# Patient Record
Sex: FEMALE | Race: WHITE | Employment: OTHER | URBAN - NONMETROPOLITAN AREA
[De-identification: names, ages, dates, MRNs, and addresses within clinical notes are randomized per-mention and may not be internally consistent; named-entity substitution may affect disease eponyms.]

---

## 2017-02-02 ENCOUNTER — HOSPITAL ENCOUNTER (EMERGENCY)
Age: 62
Discharge: HOME | End: 2017-02-02
Attending: FAMILY MEDICINE
Payer: MEDICARE

## 2017-02-02 ENCOUNTER — APPOINTMENT (OUTPATIENT)
Dept: CT IMAGING | Age: 62
End: 2017-02-02
Payer: MEDICARE

## 2017-02-02 ENCOUNTER — APPOINTMENT (OUTPATIENT)
Dept: GENERAL RADIOLOGY | Age: 62
End: 2017-02-02
Payer: MEDICARE

## 2017-02-02 VITALS
RESPIRATION RATE: 18 BRPM | OXYGEN SATURATION: 96 % | SYSTOLIC BLOOD PRESSURE: 97 MMHG | HEIGHT: 64 IN | WEIGHT: 180 LBS | BODY MASS INDEX: 30.73 KG/M2 | DIASTOLIC BLOOD PRESSURE: 61 MMHG | HEART RATE: 73 BPM | TEMPERATURE: 97.5 F

## 2017-02-02 DIAGNOSIS — N30.01 ACUTE CYSTITIS WITH HEMATURIA: Primary | ICD-10-CM

## 2017-02-02 LAB
ALBUMIN SERPL-MCNC: 4.2 G/DL (ref 3.5–5.2)
ALP BLD-CCNC: 43 U/L (ref 35–104)
ALT SERPL-CCNC: 11 U/L (ref 5–33)
AMPHETAMINE SCREEN, URINE: NEGATIVE
ANION GAP SERPL CALCULATED.3IONS-SCNC: 11 MMOL/L (ref 7–19)
AST SERPL-CCNC: 13 U/L (ref 5–32)
BACTERIA: ABNORMAL /HPF
BARBITURATE SCREEN URINE: NEGATIVE
BASOPHILS ABSOLUTE: 0 K/UL (ref 0–0.2)
BASOPHILS RELATIVE PERCENT: 0.2 % (ref 0–1)
BENZODIAZEPINE SCREEN, URINE: NEGATIVE
BILIRUB SERPL-MCNC: <0.2 MG/DL (ref 0.2–1.2)
BILIRUBIN URINE: NEGATIVE
BLOOD, URINE: NEGATIVE
BUN BLDV-MCNC: 26 MG/DL (ref 8–23)
CALCIUM SERPL-MCNC: 10 MG/DL (ref 8.8–10.2)
CANNABINOID SCREEN URINE: NEGATIVE
CHLORIDE BLD-SCNC: 104 MMOL/L (ref 98–111)
CLARITY: CLEAR
CO2: 24 MMOL/L (ref 22–29)
COCAINE METABOLITE SCREEN URINE: NEGATIVE
COLOR: YELLOW
CREAT SERPL-MCNC: 1.2 MG/DL (ref 0.5–0.9)
EOSINOPHILS ABSOLUTE: 0.9 K/UL (ref 0–0.6)
EOSINOPHILS RELATIVE PERCENT: 7.8 % (ref 0–5)
EPITHELIAL CELLS, UA: 0 /HPF (ref 0–5)
GFR NON-AFRICAN AMERICAN: 45
GLOBULIN: 2 G/DL
GLUCOSE BLD-MCNC: 87 MG/DL (ref 74–109)
GLUCOSE URINE: NEGATIVE MG/DL
HCT VFR BLD CALC: 34 % (ref 37–47)
HEMOGLOBIN: 10.5 G/DL (ref 12–16)
HYALINE CASTS: 2 /HPF (ref 0–8)
KETONES, URINE: NEGATIVE MG/DL
LEUKOCYTE ESTERASE, URINE: ABNORMAL
LITHIUM LEVEL: 0.9 MMOL/L (ref 0.6–1.2)
LYMPHOCYTES ABSOLUTE: 2.1 K/UL (ref 1.1–4.5)
LYMPHOCYTES RELATIVE PERCENT: 18.1 % (ref 20–40)
Lab: NORMAL
MCH RBC QN AUTO: 27.6 PG (ref 27–31)
MCHC RBC AUTO-ENTMCNC: 30.9 G/DL (ref 33–37)
MCV RBC AUTO: 89.2 FL (ref 81–99)
MONOCYTES ABSOLUTE: 0.4 K/UL (ref 0–0.9)
MONOCYTES RELATIVE PERCENT: 3.5 % (ref 0–10)
NEUTROPHILS ABSOLUTE: 8.2 K/UL (ref 1.5–7.5)
NEUTROPHILS RELATIVE PERCENT: 70 % (ref 50–65)
NITRITE, URINE: POSITIVE
OPIATE SCREEN URINE: NEGATIVE
PDW BLD-RTO: 12.9 % (ref 11.5–14.5)
PH UA: 7.5
PLATELET # BLD: 329 K/UL (ref 130–400)
PMV BLD AUTO: 8.8 FL (ref 7.4–10.4)
POTASSIUM SERPL-SCNC: 4.7 MMOL/L (ref 3.5–5)
PROTEIN UA: NEGATIVE MG/DL
RBC # BLD: 3.81 M/UL (ref 4.2–5.4)
RBC UA: 1 /HPF (ref 0–4)
SODIUM BLD-SCNC: 139 MMOL/L (ref 136–145)
SPECIFIC GRAVITY UA: 1.01
TOTAL PROTEIN: 6.2 G/DL (ref 6.6–8.7)
TSH SERPL DL<=0.05 MIU/L-ACNC: 1.84 UIU/ML (ref 0.27–4.2)
UROBILINOGEN, URINE: 0.2 E.U./DL
WBC # BLD: 11.7 K/UL (ref 4.8–10.8)
WBC UA: 29 /HPF (ref 0–5)

## 2017-02-02 PROCEDURE — 85025 COMPLETE CBC W/AUTO DIFF WBC: CPT

## 2017-02-02 PROCEDURE — 70450 CT HEAD/BRAIN W/O DYE: CPT

## 2017-02-02 PROCEDURE — 99282 EMERGENCY DEPT VISIT SF MDM: CPT | Performed by: FAMILY MEDICINE

## 2017-02-02 PROCEDURE — 80178 ASSAY OF LITHIUM: CPT

## 2017-02-02 PROCEDURE — 36415 COLL VENOUS BLD VENIPUNCTURE: CPT

## 2017-02-02 PROCEDURE — 6360000002 HC RX W HCPCS: Performed by: FAMILY MEDICINE

## 2017-02-02 PROCEDURE — 81001 URINALYSIS AUTO W/SCOPE: CPT

## 2017-02-02 PROCEDURE — 96374 THER/PROPH/DIAG INJ IV PUSH: CPT

## 2017-02-02 PROCEDURE — 87086 URINE CULTURE/COLONY COUNT: CPT

## 2017-02-02 PROCEDURE — 80053 COMPREHEN METABOLIC PANEL: CPT

## 2017-02-02 PROCEDURE — 2580000003 HC RX 258: Performed by: FAMILY MEDICINE

## 2017-02-02 PROCEDURE — 99284 EMERGENCY DEPT VISIT MOD MDM: CPT

## 2017-02-02 PROCEDURE — 87185 SC STD ENZYME DETCJ PER NZM: CPT

## 2017-02-02 PROCEDURE — 87077 CULTURE AEROBIC IDENTIFY: CPT

## 2017-02-02 PROCEDURE — 80307 DRUG TEST PRSMV CHEM ANLYZR: CPT

## 2017-02-02 PROCEDURE — 84443 ASSAY THYROID STIM HORMONE: CPT

## 2017-02-02 PROCEDURE — 73560 X-RAY EXAM OF KNEE 1 OR 2: CPT

## 2017-02-02 RX ORDER — PROPRANOLOL HYDROCHLORIDE 10 MG/1
10 TABLET ORAL 2 TIMES DAILY
Status: ON HOLD | COMMUNITY
End: 2017-02-07 | Stop reason: HOSPADM

## 2017-02-02 RX ORDER — LEVOFLOXACIN 500 MG/1
500 TABLET, FILM COATED ORAL DAILY
Qty: 10 TABLET | Refills: 0 | Status: ON HOLD | OUTPATIENT
Start: 2017-02-02 | End: 2017-02-07 | Stop reason: HOSPADM

## 2017-02-02 RX ORDER — OMEPRAZOLE 20 MG/1
20 CAPSULE, DELAYED RELEASE ORAL DAILY
COMMUNITY
End: 2017-05-18

## 2017-02-02 RX ORDER — LORAZEPAM 1 MG/1
1 TABLET ORAL EVERY 8 HOURS PRN
Qty: 15 TABLET | Refills: 0 | Status: ON HOLD | OUTPATIENT
Start: 2017-02-02 | End: 2017-02-07 | Stop reason: HOSPADM

## 2017-02-02 RX ORDER — 0.9 % SODIUM CHLORIDE 0.9 %
1000 INTRAVENOUS SOLUTION INTRAVENOUS ONCE
Status: COMPLETED | OUTPATIENT
Start: 2017-02-02 | End: 2017-02-02

## 2017-02-02 RX ORDER — MONTELUKAST SODIUM 10 MG/1
10 TABLET ORAL NIGHTLY
Status: ON HOLD | COMMUNITY
End: 2017-02-04

## 2017-02-02 RX ORDER — FENOFIBRATE 145 MG/1
145 TABLET, COATED ORAL DAILY
Status: ON HOLD | COMMUNITY
End: 2017-03-07 | Stop reason: HOSPADM

## 2017-02-02 RX ORDER — TRIHEXYPHENIDYL HYDROCHLORIDE 5 MG/1
5 TABLET ORAL 3 TIMES DAILY
Status: ON HOLD | COMMUNITY
End: 2017-03-07

## 2017-02-02 RX ORDER — LISINOPRIL 20 MG/1
20 TABLET ORAL DAILY
Status: ON HOLD | COMMUNITY
End: 2019-07-26

## 2017-02-02 RX ORDER — PRAVASTATIN SODIUM 10 MG
10 TABLET ORAL NIGHTLY
Status: ON HOLD | COMMUNITY
End: 2017-03-07 | Stop reason: HOSPADM

## 2017-02-02 RX ORDER — LORAZEPAM 2 MG/ML
2 INJECTION INTRAMUSCULAR ONCE
Status: COMPLETED | OUTPATIENT
Start: 2017-02-02 | End: 2017-02-02

## 2017-02-02 RX ORDER — OXYBUTYNIN CHLORIDE 5 MG/1
5 TABLET ORAL 3 TIMES DAILY
Status: ON HOLD | COMMUNITY
End: 2017-03-07 | Stop reason: HOSPADM

## 2017-02-02 RX ORDER — DONEPEZIL HYDROCHLORIDE 5 MG/1
5 TABLET, FILM COATED ORAL NIGHTLY
Status: ON HOLD | COMMUNITY
End: 2017-03-07 | Stop reason: HOSPADM

## 2017-02-02 RX ADMIN — SODIUM CHLORIDE 1000 ML: 9 INJECTION, SOLUTION INTRAVENOUS at 14:50

## 2017-02-02 RX ADMIN — LORAZEPAM 2 MG: 2 INJECTION INTRAMUSCULAR; INTRAVENOUS at 14:47

## 2017-02-04 ENCOUNTER — APPOINTMENT (OUTPATIENT)
Dept: GENERAL RADIOLOGY | Age: 62
End: 2017-02-04
Payer: MEDICARE

## 2017-02-04 ENCOUNTER — HOSPITAL ENCOUNTER (OUTPATIENT)
Age: 62
Setting detail: OBSERVATION
Discharge: HOME HEALTH | End: 2017-02-07
Attending: EMERGENCY MEDICINE | Admitting: HOSPITALIST
Payer: MEDICARE

## 2017-02-04 DIAGNOSIS — R79.89 ELEVATED SERUM CREATININE: ICD-10-CM

## 2017-02-04 DIAGNOSIS — R50.9 FEBRILE ILLNESS: ICD-10-CM

## 2017-02-04 DIAGNOSIS — Z87.440 HISTORY OF UTI: ICD-10-CM

## 2017-02-04 DIAGNOSIS — R79.89 ELEVATED LACTIC ACID LEVEL: ICD-10-CM

## 2017-02-04 DIAGNOSIS — E87.5 HYPERKALEMIA: ICD-10-CM

## 2017-02-04 DIAGNOSIS — R06.4 HYPERVENTILATION: ICD-10-CM

## 2017-02-04 DIAGNOSIS — R68.89 RIGOR: Primary | ICD-10-CM

## 2017-02-04 PROBLEM — D64.9 ANEMIA OF UNKNOWN ETIOLOGY: Status: ACTIVE | Noted: 2017-02-04

## 2017-02-04 LAB
ALBUMIN SERPL-MCNC: 4.2 G/DL (ref 3.5–5.2)
ALP BLD-CCNC: 41 U/L (ref 35–104)
ALT SERPL-CCNC: 10 U/L (ref 5–33)
AMYLASE: 44 U/L (ref 28–100)
ANION GAP SERPL CALCULATED.3IONS-SCNC: 13 MMOL/L (ref 7–19)
APTT: 23.3 SEC (ref 26–36.2)
AST SERPL-CCNC: 17 U/L (ref 5–32)
BASE EXCESS ARTERIAL: -1.3 MMOL/L (ref -2–2)
BASOPHILS ABSOLUTE: 0 K/UL (ref 0–0.2)
BASOPHILS RELATIVE PERCENT: 0.3 % (ref 0–1)
BILIRUB SERPL-MCNC: <0.2 MG/DL (ref 0.2–1.2)
BILIRUBIN URINE: NEGATIVE
BLOOD, URINE: NEGATIVE
BUN BLDV-MCNC: 22 MG/DL (ref 8–23)
CALCIUM SERPL-MCNC: 10.1 MG/DL (ref 8.8–10.2)
CARBOXYHEMOGLOBIN ARTERIAL: 0 % (ref 0–5)
CHLORIDE BLD-SCNC: 105 MMOL/L (ref 98–111)
CLARITY: CLEAR
CO2: 21 MMOL/L (ref 22–29)
COLOR: YELLOW
CREAT SERPL-MCNC: 1.6 MG/DL (ref 0.5–0.9)
EOSINOPHILS ABSOLUTE: 0.5 K/UL (ref 0–0.6)
EOSINOPHILS RELATIVE PERCENT: 4.2 % (ref 0–5)
GFR NON-AFRICAN AMERICAN: 33
GLOBULIN: 1.9 G/DL
GLUCOSE BLD-MCNC: 108 MG/DL (ref 74–109)
GLUCOSE URINE: NEGATIVE MG/DL
HCO3 ARTERIAL: 21.1 MMOL/L (ref 22–26)
HCT VFR BLD CALC: 33 % (ref 37–47)
HEMOGLOBIN, ART, EXTENDED: 10.1 G/DL (ref 12–16)
HEMOGLOBIN: 10.6 G/DL (ref 12–16)
KETONES, URINE: NEGATIVE MG/DL
LACTIC ACID: 2.7 MG/DL (ref 0.5–1.9)
LEUKOCYTE ESTERASE, URINE: NEGATIVE
LIPASE: 36 U/L (ref 13–60)
LITHIUM LEVEL: 1 MMOL/L (ref 0.6–1.2)
LYMPHOCYTES ABSOLUTE: 1.5 K/UL (ref 1.1–4.5)
LYMPHOCYTES RELATIVE PERCENT: 13.8 % (ref 20–40)
MCH RBC QN AUTO: 28.4 PG (ref 27–31)
MCHC RBC AUTO-ENTMCNC: 32.1 G/DL (ref 33–37)
MCV RBC AUTO: 88.5 FL (ref 81–99)
METHEMOGLOBIN ARTERIAL: 0 %
MONOCYTES ABSOLUTE: 0.5 K/UL (ref 0–0.9)
MONOCYTES RELATIVE PERCENT: 4.3 % (ref 0–10)
NEUTROPHILS ABSOLUTE: 8.6 K/UL (ref 1.5–7.5)
NEUTROPHILS RELATIVE PERCENT: 77.4 % (ref 50–65)
NITRITE, URINE: NEGATIVE
O2 CONTENT ARTERIAL: 14.2 ML/DL
O2 SAT, ARTERIAL: 95.7 %
O2 THERAPY: ABNORMAL
ORGANISM: ABNORMAL
PCO2 ARTERIAL: 27 MMHG (ref 35–45)
PDW BLD-RTO: 12.9 % (ref 11.5–14.5)
PH ARTERIAL: 7.5 (ref 7.35–7.45)
PH UA: 6
PLATELET # BLD: 504 K/UL (ref 130–400)
PMV BLD AUTO: 8.7 FL (ref 7.4–10.4)
PO2 ARTERIAL: 249 MMHG (ref 80–100)
POTASSIUM SERPL-SCNC: 5.4 MMOL/L (ref 3.5–5)
POTASSIUM, WHOLE BLOOD: 4.7
PROTEIN UA: NEGATIVE MG/DL
RAPID INFLUENZA  B AGN: NEGATIVE
RAPID INFLUENZA A AGN: NEGATIVE
RBC # BLD: 3.73 M/UL (ref 4.2–5.4)
SODIUM BLD-SCNC: 139 MMOL/L (ref 136–145)
SPECIFIC GRAVITY UA: 1.01
TOTAL CK: 44 U/L (ref 26–192)
TOTAL PROTEIN: 6.1 G/DL (ref 6.6–8.7)
TROPONIN: 0.01 NG/ML (ref 0–0.03)
TSH SERPL DL<=0.05 MIU/L-ACNC: 3.31 UIU/ML (ref 0.27–4.2)
URINE CULTURE, ROUTINE: ABNORMAL
URINE CULTURE, ROUTINE: ABNORMAL
UROBILINOGEN, URINE: 0.2 E.U./DL
WBC # BLD: 11.1 K/UL (ref 4.8–10.8)

## 2017-02-04 PROCEDURE — 71010 XR CHEST PORTABLE: CPT

## 2017-02-04 PROCEDURE — 84484 ASSAY OF TROPONIN QUANT: CPT

## 2017-02-04 PROCEDURE — 85730 THROMBOPLASTIN TIME PARTIAL: CPT

## 2017-02-04 PROCEDURE — 87040 BLOOD CULTURE FOR BACTERIA: CPT

## 2017-02-04 PROCEDURE — 84132 ASSAY OF SERUM POTASSIUM: CPT

## 2017-02-04 PROCEDURE — 99285 EMERGENCY DEPT VISIT HI MDM: CPT

## 2017-02-04 PROCEDURE — G0378 HOSPITAL OBSERVATION PER HR: HCPCS

## 2017-02-04 PROCEDURE — 2580000003 HC RX 258: Performed by: CLINICAL NURSE SPECIALIST

## 2017-02-04 PROCEDURE — 80178 ASSAY OF LITHIUM: CPT

## 2017-02-04 PROCEDURE — 83605 ASSAY OF LACTIC ACID: CPT

## 2017-02-04 PROCEDURE — 82150 ASSAY OF AMYLASE: CPT

## 2017-02-04 PROCEDURE — 2580000003 HC RX 258: Performed by: EMERGENCY MEDICINE

## 2017-02-04 PROCEDURE — 83690 ASSAY OF LIPASE: CPT

## 2017-02-04 PROCEDURE — 82550 ASSAY OF CK (CPK): CPT

## 2017-02-04 PROCEDURE — 81003 URINALYSIS AUTO W/O SCOPE: CPT

## 2017-02-04 PROCEDURE — 6370000000 HC RX 637 (ALT 250 FOR IP): Performed by: EMERGENCY MEDICINE

## 2017-02-04 PROCEDURE — 84443 ASSAY THYROID STIM HORMONE: CPT

## 2017-02-04 PROCEDURE — 80053 COMPREHEN METABOLIC PANEL: CPT

## 2017-02-04 PROCEDURE — 82803 BLOOD GASES ANY COMBINATION: CPT

## 2017-02-04 PROCEDURE — 85025 COMPLETE CBC W/AUTO DIFF WBC: CPT

## 2017-02-04 PROCEDURE — 36600 WITHDRAWAL OF ARTERIAL BLOOD: CPT

## 2017-02-04 PROCEDURE — 6370000000 HC RX 637 (ALT 250 FOR IP): Performed by: CLINICAL NURSE SPECIALIST

## 2017-02-04 PROCEDURE — 99219 PR INITIAL OBSERVATION CARE/DAY 50 MINUTES: CPT | Performed by: CLINICAL NURSE SPECIALIST

## 2017-02-04 PROCEDURE — 99284 EMERGENCY DEPT VISIT MOD MDM: CPT | Performed by: EMERGENCY MEDICINE

## 2017-02-04 PROCEDURE — 96365 THER/PROPH/DIAG IV INF INIT: CPT

## 2017-02-04 PROCEDURE — 93005 ELECTROCARDIOGRAM TRACING: CPT

## 2017-02-04 PROCEDURE — 6360000002 HC RX W HCPCS: Performed by: EMERGENCY MEDICINE

## 2017-02-04 PROCEDURE — 36415 COLL VENOUS BLD VENIPUNCTURE: CPT

## 2017-02-04 PROCEDURE — 87804 INFLUENZA ASSAY W/OPTIC: CPT

## 2017-02-04 RX ORDER — ACETAMINOPHEN 325 MG/1
650 TABLET ORAL EVERY 4 HOURS PRN
Status: DISCONTINUED | OUTPATIENT
Start: 2017-02-04 | End: 2017-02-07 | Stop reason: HOSPADM

## 2017-02-04 RX ORDER — PRAVASTATIN SODIUM 20 MG
10 TABLET ORAL NIGHTLY
Status: DISCONTINUED | OUTPATIENT
Start: 2017-02-04 | End: 2017-02-07 | Stop reason: HOSPADM

## 2017-02-04 RX ORDER — FENOFIBRATE 160 MG/1
160 TABLET ORAL DAILY
Status: DISCONTINUED | OUTPATIENT
Start: 2017-02-05 | End: 2017-02-07 | Stop reason: HOSPADM

## 2017-02-04 RX ORDER — SODIUM CHLORIDE 9 MG/ML
INJECTION, SOLUTION INTRAVENOUS CONTINUOUS
Status: DISCONTINUED | OUTPATIENT
Start: 2017-02-04 | End: 2017-02-06

## 2017-02-04 RX ORDER — PANTOPRAZOLE SODIUM 40 MG/1
40 TABLET, DELAYED RELEASE ORAL
Status: DISCONTINUED | OUTPATIENT
Start: 2017-02-05 | End: 2017-02-07 | Stop reason: HOSPADM

## 2017-02-04 RX ORDER — TRIHEXYPHENIDYL HYDROCHLORIDE 2 MG/1
5 TABLET ORAL 3 TIMES DAILY
Status: DISCONTINUED | OUTPATIENT
Start: 2017-02-04 | End: 2017-02-07 | Stop reason: HOSPADM

## 2017-02-04 RX ORDER — PROPRANOLOL HYDROCHLORIDE 10 MG/1
10 TABLET ORAL 2 TIMES DAILY
Status: DISCONTINUED | OUTPATIENT
Start: 2017-02-04 | End: 2017-02-06

## 2017-02-04 RX ORDER — OXYBUTYNIN CHLORIDE 5 MG/1
5 TABLET ORAL 3 TIMES DAILY
Status: DISCONTINUED | OUTPATIENT
Start: 2017-02-04 | End: 2017-02-07 | Stop reason: HOSPADM

## 2017-02-04 RX ORDER — LORAZEPAM 1 MG/1
1 TABLET ORAL EVERY 8 HOURS PRN
Status: DISCONTINUED | OUTPATIENT
Start: 2017-02-04 | End: 2017-02-04

## 2017-02-04 RX ORDER — ESCITALOPRAM OXALATE 10 MG/1
20 TABLET ORAL DAILY
Status: DISCONTINUED | OUTPATIENT
Start: 2017-02-05 | End: 2017-02-07 | Stop reason: HOSPADM

## 2017-02-04 RX ORDER — LEVOTHYROXINE SODIUM 0.05 MG/1
75 TABLET ORAL DAILY
Status: DISCONTINUED | OUTPATIENT
Start: 2017-02-05 | End: 2017-02-07 | Stop reason: HOSPADM

## 2017-02-04 RX ORDER — DEXTROSE MONOHYDRATE 50 MG/ML
100 INJECTION, SOLUTION INTRAVENOUS PRN
Status: DISCONTINUED | OUTPATIENT
Start: 2017-02-04 | End: 2017-02-06

## 2017-02-04 RX ORDER — MONTELUKAST SODIUM 10 MG/1
10 TABLET ORAL DAILY
Status: ON HOLD | COMMUNITY
End: 2018-04-02 | Stop reason: ALTCHOICE

## 2017-02-04 RX ORDER — ACETAMINOPHEN 650 MG/1
650 SUPPOSITORY RECTAL ONCE
Status: COMPLETED | OUTPATIENT
Start: 2017-02-04 | End: 2017-02-04

## 2017-02-04 RX ORDER — MONTELUKAST SODIUM 10 MG/1
10 TABLET ORAL DAILY
Status: DISCONTINUED | OUTPATIENT
Start: 2017-02-05 | End: 2017-02-07 | Stop reason: HOSPADM

## 2017-02-04 RX ORDER — GABAPENTIN 600 MG/1
600 TABLET ORAL 2 TIMES DAILY
Status: DISCONTINUED | OUTPATIENT
Start: 2017-02-04 | End: 2017-02-07 | Stop reason: HOSPADM

## 2017-02-04 RX ORDER — ARIPIPRAZOLE 30 MG/1
30 TABLET ORAL NIGHTLY
Status: DISCONTINUED | OUTPATIENT
Start: 2017-02-04 | End: 2017-02-07 | Stop reason: HOSPADM

## 2017-02-04 RX ORDER — LITHIUM CARBONATE 150 MG/1
600 CAPSULE ORAL DAILY
Status: DISCONTINUED | OUTPATIENT
Start: 2017-02-05 | End: 2017-02-07 | Stop reason: HOSPADM

## 2017-02-04 RX ORDER — DEXTROSE MONOHYDRATE 25 G/50ML
12.5 INJECTION, SOLUTION INTRAVENOUS PRN
Status: DISCONTINUED | OUTPATIENT
Start: 2017-02-04 | End: 2017-02-07 | Stop reason: HOSPADM

## 2017-02-04 RX ORDER — SUMATRIPTAN 25 MG/1
100 TABLET, FILM COATED ORAL DAILY
Status: DISCONTINUED | OUTPATIENT
Start: 2017-02-05 | End: 2017-02-05

## 2017-02-04 RX ORDER — TRAZODONE HYDROCHLORIDE 50 MG/1
50 TABLET ORAL NIGHTLY
COMMUNITY
End: 2017-04-11 | Stop reason: HOSPADM

## 2017-02-04 RX ORDER — DONEPEZIL HYDROCHLORIDE 5 MG/1
5 TABLET, FILM COATED ORAL NIGHTLY
Status: DISCONTINUED | OUTPATIENT
Start: 2017-02-04 | End: 2017-02-07 | Stop reason: HOSPADM

## 2017-02-04 RX ORDER — TRAZODONE HYDROCHLORIDE 50 MG/1
50 TABLET ORAL NIGHTLY
Status: DISCONTINUED | OUTPATIENT
Start: 2017-02-04 | End: 2017-02-07 | Stop reason: HOSPADM

## 2017-02-04 RX ORDER — NICOTINE POLACRILEX 4 MG
15 LOZENGE BUCCAL PRN
Status: DISCONTINUED | OUTPATIENT
Start: 2017-02-04 | End: 2017-02-07 | Stop reason: HOSPADM

## 2017-02-04 RX ORDER — SUCRALFATE 1 G/1
1 TABLET ORAL 4 TIMES DAILY
Status: DISCONTINUED | OUTPATIENT
Start: 2017-02-04 | End: 2017-02-07 | Stop reason: HOSPADM

## 2017-02-04 RX ORDER — LISINOPRIL 20 MG/1
20 TABLET ORAL DAILY
Status: DISCONTINUED | OUTPATIENT
Start: 2017-02-05 | End: 2017-02-07 | Stop reason: HOSPADM

## 2017-02-04 RX ADMIN — ACETAMINOPHEN 650 MG: 650 SUPPOSITORY RECTAL at 17:08

## 2017-02-04 RX ADMIN — OXYBUTYNIN CHLORIDE 5 MG: 5 TABLET ORAL at 22:13

## 2017-02-04 RX ADMIN — SODIUM CHLORIDE: 9 INJECTION, SOLUTION INTRAVENOUS at 22:15

## 2017-02-04 RX ADMIN — DONEPEZIL HYDROCHLORIDE 5 MG: 5 TABLET, FILM COATED ORAL at 22:14

## 2017-02-04 RX ADMIN — PRAVASTATIN SODIUM 10 MG: 20 TABLET ORAL at 22:13

## 2017-02-04 RX ADMIN — PROPRANOLOL HYDROCHLORIDE 10 MG: 10 TABLET ORAL at 22:13

## 2017-02-04 RX ADMIN — TRIHEXYPHENIDYL HYDROCHLORIDE 5 MG: 2 TABLET ORAL at 22:13

## 2017-02-04 RX ADMIN — CEFTRIAXONE SODIUM 1 G: 1 INJECTION, POWDER, FOR SOLUTION INTRAMUSCULAR; INTRAVENOUS at 17:55

## 2017-02-04 RX ADMIN — BECLOMETHASONE DIPROPIONATE 2 PUFF: 80 AEROSOL, METERED RESPIRATORY (INHALATION) at 22:37

## 2017-02-04 RX ADMIN — ARIPIPRAZOLE 30 MG: 30 TABLET ORAL at 22:13

## 2017-02-04 RX ADMIN — GABAPENTIN 600 MG: 600 TABLET, FILM COATED ORAL at 22:13

## 2017-02-04 RX ADMIN — SUCRALFATE 1 G: 1 TABLET ORAL at 22:13

## 2017-02-04 RX ADMIN — TRAZODONE HYDROCHLORIDE 50 MG: 50 TABLET ORAL at 22:13

## 2017-02-04 ASSESSMENT — ENCOUNTER SYMPTOMS
CHOKING: 0
EYE DISCHARGE: 0
SORE THROAT: 0
FACIAL SWELLING: 0
DIARRHEA: 0
NAUSEA: 0
APNEA: 0
VOMITING: 0
SINUS PRESSURE: 0
VOICE CHANGE: 0
BLOOD IN STOOL: 0
CONSTIPATION: 0

## 2017-02-05 ENCOUNTER — APPOINTMENT (OUTPATIENT)
Dept: CT IMAGING | Age: 62
End: 2017-02-05
Payer: MEDICARE

## 2017-02-05 PROBLEM — G93.41 METABOLIC ENCEPHALOPATHY: Status: ACTIVE | Noted: 2017-02-05

## 2017-02-05 PROBLEM — N39.0 URINARY TRACT INFECTION WITHOUT HEMATURIA: Status: ACTIVE | Noted: 2017-02-05

## 2017-02-05 PROBLEM — N17.9 ACUTE KIDNEY INJURY SUPERIMPOSED ON CKD (HCC): Status: ACTIVE | Noted: 2017-02-05

## 2017-02-05 PROBLEM — N18.9 ACUTE KIDNEY INJURY SUPERIMPOSED ON CKD (HCC): Status: ACTIVE | Noted: 2017-02-05

## 2017-02-05 LAB
ANION GAP SERPL CALCULATED.3IONS-SCNC: 11 MMOL/L (ref 7–19)
BUN BLDV-MCNC: 17 MG/DL (ref 8–23)
CALCIUM SERPL-MCNC: 10 MG/DL (ref 8.8–10.2)
CHLORIDE BLD-SCNC: 106 MMOL/L (ref 98–111)
CO2: 23 MMOL/L (ref 22–29)
CREAT SERPL-MCNC: 1.3 MG/DL (ref 0.5–0.9)
GFR NON-AFRICAN AMERICAN: 41
GLUCOSE BLD-MCNC: 108 MG/DL (ref 70–99)
GLUCOSE BLD-MCNC: 117 MG/DL (ref 70–99)
GLUCOSE BLD-MCNC: 138 MG/DL (ref 70–99)
GLUCOSE BLD-MCNC: 94 MG/DL (ref 74–109)
HBA1C MFR BLD: 5.6 %
HCT VFR BLD CALC: 31.7 % (ref 37–47)
HEMOGLOBIN: 9.5 G/DL (ref 12–16)
IRON SATURATION: 22 % (ref 14–50)
IRON: 78 UG/DL (ref 37–145)
LACTIC ACID: 1.2 MG/DL (ref 0.5–1.9)
MAGNESIUM: 1.4 MG/DL (ref 1.6–2.4)
MCH RBC QN AUTO: 27.4 PG (ref 27–31)
MCHC RBC AUTO-ENTMCNC: 30 G/DL (ref 33–37)
MCV RBC AUTO: 91.4 FL (ref 81–99)
PDW BLD-RTO: 13.2 % (ref 11.5–14.5)
PERFORMED ON: ABNORMAL
PHOSPHORUS: 3.7 MG/DL (ref 2.5–4.5)
PLATELET # BLD: 340 K/UL (ref 130–400)
PMV BLD AUTO: 8.4 FL (ref 7.4–10.4)
POTASSIUM SERPL-SCNC: 3.7 MMOL/L (ref 3.5–5)
RBC # BLD: 3.47 M/UL (ref 4.2–5.4)
SODIUM BLD-SCNC: 140 MMOL/L (ref 136–145)
TOTAL IRON BINDING CAPACITY: 359 UG/DL (ref 250–400)
TSH SERPL DL<=0.05 MIU/L-ACNC: 1.72 UIU/ML (ref 0.27–4.2)
VITAMIN B-12: 710 PG/ML (ref 211–946)
WBC # BLD: 7.3 K/UL (ref 4.8–10.8)

## 2017-02-05 PROCEDURE — 96366 THER/PROPH/DIAG IV INF ADDON: CPT

## 2017-02-05 PROCEDURE — 80048 BASIC METABOLIC PNL TOTAL CA: CPT

## 2017-02-05 PROCEDURE — 96372 THER/PROPH/DIAG INJ SC/IM: CPT

## 2017-02-05 PROCEDURE — 96367 TX/PROPH/DG ADDL SEQ IV INF: CPT

## 2017-02-05 PROCEDURE — 83605 ASSAY OF LACTIC ACID: CPT

## 2017-02-05 PROCEDURE — 83550 IRON BINDING TEST: CPT

## 2017-02-05 PROCEDURE — 82607 VITAMIN B-12: CPT

## 2017-02-05 PROCEDURE — 96375 TX/PRO/DX INJ NEW DRUG ADDON: CPT

## 2017-02-05 PROCEDURE — 83540 ASSAY OF IRON: CPT

## 2017-02-05 PROCEDURE — 83036 HEMOGLOBIN GLYCOSYLATED A1C: CPT

## 2017-02-05 PROCEDURE — 83735 ASSAY OF MAGNESIUM: CPT

## 2017-02-05 PROCEDURE — 82948 REAGENT STRIP/BLOOD GLUCOSE: CPT

## 2017-02-05 PROCEDURE — 85027 COMPLETE CBC AUTOMATED: CPT

## 2017-02-05 PROCEDURE — 74150 CT ABDOMEN W/O CONTRAST: CPT

## 2017-02-05 PROCEDURE — 96376 TX/PRO/DX INJ SAME DRUG ADON: CPT

## 2017-02-05 PROCEDURE — 99226 PR SBSQ OBSERVATION CARE/DAY 35 MINUTES: CPT | Performed by: HOSPITALIST

## 2017-02-05 PROCEDURE — 6360000002 HC RX W HCPCS: Performed by: HOSPITALIST

## 2017-02-05 PROCEDURE — 6360000002 HC RX W HCPCS: Performed by: PHYSICIAN ASSISTANT

## 2017-02-05 PROCEDURE — 84100 ASSAY OF PHOSPHORUS: CPT

## 2017-02-05 PROCEDURE — 36415 COLL VENOUS BLD VENIPUNCTURE: CPT

## 2017-02-05 PROCEDURE — 6360000002 HC RX W HCPCS: Performed by: CLINICAL NURSE SPECIALIST

## 2017-02-05 PROCEDURE — 84443 ASSAY THYROID STIM HORMONE: CPT

## 2017-02-05 PROCEDURE — 6370000000 HC RX 637 (ALT 250 FOR IP): Performed by: INTERNAL MEDICINE

## 2017-02-05 PROCEDURE — 6370000000 HC RX 637 (ALT 250 FOR IP): Performed by: CLINICAL NURSE SPECIALIST

## 2017-02-05 PROCEDURE — 2580000003 HC RX 258: Performed by: CLINICAL NURSE SPECIALIST

## 2017-02-05 PROCEDURE — 6360000002 HC RX W HCPCS

## 2017-02-05 PROCEDURE — G0378 HOSPITAL OBSERVATION PER HR: HCPCS

## 2017-02-05 RX ORDER — LORAZEPAM 2 MG/ML
1 INJECTION INTRAMUSCULAR EVERY 4 HOURS PRN
Status: DISCONTINUED | OUTPATIENT
Start: 2017-02-05 | End: 2017-02-06

## 2017-02-05 RX ORDER — LORAZEPAM 2 MG/ML
INJECTION INTRAMUSCULAR
Status: COMPLETED
Start: 2017-02-05 | End: 2017-02-05

## 2017-02-05 RX ORDER — SUMATRIPTAN 25 MG/1
100 TABLET, FILM COATED ORAL DAILY PRN
Status: DISCONTINUED | OUTPATIENT
Start: 2017-02-05 | End: 2017-02-07 | Stop reason: HOSPADM

## 2017-02-05 RX ADMIN — BECLOMETHASONE DIPROPIONATE 2 PUFF: 80 AEROSOL, METERED RESPIRATORY (INHALATION) at 21:56

## 2017-02-05 RX ADMIN — LORAZEPAM 1 MG: 2 INJECTION INTRAMUSCULAR; INTRAVENOUS at 15:35

## 2017-02-05 RX ADMIN — PANTOPRAZOLE SODIUM 40 MG: 40 TABLET, DELAYED RELEASE ORAL at 06:59

## 2017-02-05 RX ADMIN — MONTELUKAST SODIUM 10 MG: 10 TABLET ORAL at 09:56

## 2017-02-05 RX ADMIN — LEVOTHYROXINE SODIUM 75 MCG: 50 TABLET ORAL at 09:55

## 2017-02-05 RX ADMIN — LITHIUM CARBONATE 600 MG: 150 CAPSULE, GELATIN COATED ORAL at 12:21

## 2017-02-05 RX ADMIN — MAGNESIUM SULFATE IN DEXTROSE 1 G: 10 INJECTION, SOLUTION INTRAVENOUS at 14:32

## 2017-02-05 RX ADMIN — ESCITALOPRAM OXALATE 20 MG: 10 TABLET ORAL at 09:56

## 2017-02-05 RX ADMIN — ENOXAPARIN SODIUM 40 MG: 40 INJECTION SUBCUTANEOUS at 09:57

## 2017-02-05 RX ADMIN — SUCRALFATE 1 G: 1 TABLET ORAL at 17:50

## 2017-02-05 RX ADMIN — LORAZEPAM 1 MG: 2 INJECTION INTRAMUSCULAR; INTRAVENOUS at 18:44

## 2017-02-05 RX ADMIN — TRIHEXYPHENIDYL HYDROCHLORIDE 5 MG: 2 TABLET ORAL at 09:54

## 2017-02-05 RX ADMIN — OXYBUTYNIN CHLORIDE 5 MG: 5 TABLET ORAL at 14:25

## 2017-02-05 RX ADMIN — GABAPENTIN 600 MG: 600 TABLET, FILM COATED ORAL at 09:56

## 2017-02-05 RX ADMIN — SUMATRIPTAN SUCCINATE 100 MG: 25 TABLET ORAL at 12:47

## 2017-02-05 RX ADMIN — FENOFIBRATE 160 MG: 160 TABLET ORAL at 09:55

## 2017-02-05 RX ADMIN — LISINOPRIL 20 MG: 20 TABLET ORAL at 09:57

## 2017-02-05 RX ADMIN — SUCRALFATE 1 G: 1 TABLET ORAL at 12:21

## 2017-02-05 RX ADMIN — PRAVASTATIN SODIUM 10 MG: 20 TABLET ORAL at 21:58

## 2017-02-05 RX ADMIN — ARIPIPRAZOLE 30 MG: 30 TABLET ORAL at 21:58

## 2017-02-05 RX ADMIN — CEFTRIAXONE SODIUM 1 G: 1 INJECTION, POWDER, FOR SOLUTION INTRAMUSCULAR; INTRAVENOUS at 17:50

## 2017-02-05 RX ADMIN — TRIHEXYPHENIDYL HYDROCHLORIDE 5 MG: 2 TABLET ORAL at 14:25

## 2017-02-05 RX ADMIN — OXYBUTYNIN CHLORIDE 5 MG: 5 TABLET ORAL at 21:59

## 2017-02-05 RX ADMIN — SUCRALFATE 1 G: 1 TABLET ORAL at 09:56

## 2017-02-05 RX ADMIN — OXYBUTYNIN CHLORIDE 5 MG: 5 TABLET ORAL at 09:57

## 2017-02-05 RX ADMIN — TRIHEXYPHENIDYL HYDROCHLORIDE 5 MG: 2 TABLET ORAL at 21:57

## 2017-02-05 RX ADMIN — GABAPENTIN 600 MG: 600 TABLET, FILM COATED ORAL at 21:57

## 2017-02-05 RX ADMIN — PROPRANOLOL HYDROCHLORIDE 10 MG: 10 TABLET ORAL at 09:56

## 2017-02-05 RX ADMIN — LORAZEPAM 1 MG: 2 INJECTION, SOLUTION INTRAMUSCULAR; INTRAVENOUS at 18:44

## 2017-02-05 RX ADMIN — SUCRALFATE 1 G: 1 TABLET ORAL at 21:57

## 2017-02-05 RX ADMIN — TRAZODONE HYDROCHLORIDE 50 MG: 50 TABLET ORAL at 21:58

## 2017-02-05 RX ADMIN — DONEPEZIL HYDROCHLORIDE 5 MG: 5 TABLET, FILM COATED ORAL at 21:58

## 2017-02-05 RX ADMIN — PROPRANOLOL HYDROCHLORIDE 10 MG: 10 TABLET ORAL at 21:58

## 2017-02-05 ASSESSMENT — PAIN SCALES - GENERAL: PAINLEVEL_OUTOF10: 8

## 2017-02-06 ENCOUNTER — OFFICE VISIT (OUTPATIENT)
Dept: HOSPICE | Age: 62
End: 2017-02-06
Payer: MEDICARE

## 2017-02-06 PROBLEM — R00.1 SINUS BRADYCARDIA: Status: ACTIVE | Noted: 2017-02-06

## 2017-02-06 LAB
ANION GAP SERPL CALCULATED.3IONS-SCNC: 12 MMOL/L (ref 7–19)
ANION GAP SERPL CALCULATED.3IONS-SCNC: 9 MMOL/L (ref 7–19)
BUN BLDV-MCNC: 14 MG/DL (ref 8–23)
BUN BLDV-MCNC: 14 MG/DL (ref 8–23)
CALCIUM SERPL-MCNC: 9 MG/DL (ref 8.8–10.2)
CALCIUM SERPL-MCNC: 9.2 MG/DL (ref 8.8–10.2)
CHLORIDE BLD-SCNC: 108 MMOL/L (ref 98–111)
CHLORIDE BLD-SCNC: 109 MMOL/L (ref 98–111)
CO2: 25 MMOL/L (ref 22–29)
CO2: 28 MMOL/L (ref 22–29)
CREAT SERPL-MCNC: 1.2 MG/DL (ref 0.5–0.9)
CREAT SERPL-MCNC: 1.3 MG/DL (ref 0.5–0.9)
GFR NON-AFRICAN AMERICAN: 41
GFR NON-AFRICAN AMERICAN: 45
GLUCOSE BLD-MCNC: 103 MG/DL (ref 70–99)
GLUCOSE BLD-MCNC: 103 MG/DL (ref 74–109)
GLUCOSE BLD-MCNC: 104 MG/DL (ref 70–99)
GLUCOSE BLD-MCNC: 108 MG/DL (ref 70–99)
GLUCOSE BLD-MCNC: 117 MG/DL (ref 74–109)
GLUCOSE BLD-MCNC: 119 MG/DL (ref 70–99)
HCT VFR BLD CALC: 30 % (ref 37–47)
HEMOGLOBIN: 9.1 G/DL (ref 12–16)
LITHIUM LEVEL: 0.8 MMOL/L (ref 0.6–1.2)
MAGNESIUM: 1.5 MG/DL (ref 1.6–2.4)
MCH RBC QN AUTO: 27.8 PG (ref 27–31)
MCHC RBC AUTO-ENTMCNC: 30.3 G/DL (ref 33–37)
MCV RBC AUTO: 91.7 FL (ref 81–99)
PDW BLD-RTO: 13.3 % (ref 11.5–14.5)
PERFORMED ON: ABNORMAL
PLATELET # BLD: 335 K/UL (ref 130–400)
PMV BLD AUTO: 8.5 FL (ref 7.4–10.4)
POTASSIUM SERPL-SCNC: 3.6 MMOL/L (ref 3.5–5)
POTASSIUM SERPL-SCNC: 3.8 MMOL/L (ref 3.5–5)
RBC # BLD: 3.27 M/UL (ref 4.2–5.4)
SODIUM BLD-SCNC: 145 MMOL/L (ref 136–145)
SODIUM BLD-SCNC: 146 MMOL/L (ref 136–145)
TROPONIN: <0.01 NG/ML (ref 0–0.03)
WBC # BLD: 6 K/UL (ref 4.8–10.8)

## 2017-02-06 PROCEDURE — 85027 COMPLETE CBC AUTOMATED: CPT

## 2017-02-06 PROCEDURE — 6360000002 HC RX W HCPCS: Performed by: PHYSICIAN ASSISTANT

## 2017-02-06 PROCEDURE — 96375 TX/PRO/DX INJ NEW DRUG ADDON: CPT

## 2017-02-06 PROCEDURE — 6370000000 HC RX 637 (ALT 250 FOR IP): Performed by: CLINICAL NURSE SPECIALIST

## 2017-02-06 PROCEDURE — 36415 COLL VENOUS BLD VENIPUNCTURE: CPT

## 2017-02-06 PROCEDURE — 6370000000 HC RX 637 (ALT 250 FOR IP): Performed by: PHYSICIAN ASSISTANT

## 2017-02-06 PROCEDURE — G0378 HOSPITAL OBSERVATION PER HR: HCPCS

## 2017-02-06 PROCEDURE — 96366 THER/PROPH/DIAG IV INF ADDON: CPT

## 2017-02-06 PROCEDURE — 6370000000 HC RX 637 (ALT 250 FOR IP): Performed by: INTERNAL MEDICINE

## 2017-02-06 PROCEDURE — 80048 BASIC METABOLIC PNL TOTAL CA: CPT

## 2017-02-06 PROCEDURE — 93005 ELECTROCARDIOGRAM TRACING: CPT

## 2017-02-06 PROCEDURE — 84484 ASSAY OF TROPONIN QUANT: CPT

## 2017-02-06 PROCEDURE — 2580000003 HC RX 258: Performed by: CLINICAL NURSE SPECIALIST

## 2017-02-06 PROCEDURE — 96376 TX/PRO/DX INJ SAME DRUG ADON: CPT

## 2017-02-06 PROCEDURE — 96372 THER/PROPH/DIAG INJ SC/IM: CPT

## 2017-02-06 PROCEDURE — 82948 REAGENT STRIP/BLOOD GLUCOSE: CPT

## 2017-02-06 PROCEDURE — 83735 ASSAY OF MAGNESIUM: CPT

## 2017-02-06 PROCEDURE — 2580000003 HC RX 258: Performed by: HOSPITALIST

## 2017-02-06 PROCEDURE — 6360000002 HC RX W HCPCS: Performed by: CLINICAL NURSE SPECIALIST

## 2017-02-06 PROCEDURE — 99226 PR SBSQ OBSERVATION CARE/DAY 35 MINUTES: CPT | Performed by: HOSPITALIST

## 2017-02-06 PROCEDURE — 80178 ASSAY OF LITHIUM: CPT

## 2017-02-06 PROCEDURE — 2580000003 HC RX 258: Performed by: PHYSICIAN ASSISTANT

## 2017-02-06 RX ORDER — SODIUM CHLORIDE 450 MG/100ML
INJECTION, SOLUTION INTRAVENOUS CONTINUOUS
Status: DISCONTINUED | OUTPATIENT
Start: 2017-02-06 | End: 2017-02-06

## 2017-02-06 RX ORDER — LORAZEPAM 2 MG/ML
0.5 INJECTION INTRAMUSCULAR EVERY 8 HOURS PRN
Status: DISCONTINUED | OUTPATIENT
Start: 2017-02-06 | End: 2017-02-07 | Stop reason: HOSPADM

## 2017-02-06 RX ORDER — DOCUSATE SODIUM 100 MG/1
100 CAPSULE, LIQUID FILLED ORAL 2 TIMES DAILY
Status: DISCONTINUED | OUTPATIENT
Start: 2017-02-06 | End: 2017-02-07 | Stop reason: HOSPADM

## 2017-02-06 RX ORDER — DEXTROSE MONOHYDRATE 50 MG/ML
INJECTION, SOLUTION INTRAVENOUS CONTINUOUS
Status: DISCONTINUED | OUTPATIENT
Start: 2017-02-06 | End: 2017-02-07 | Stop reason: HOSPADM

## 2017-02-06 RX ORDER — ONDANSETRON 2 MG/ML
4 INJECTION INTRAMUSCULAR; INTRAVENOUS EVERY 6 HOURS PRN
Status: DISCONTINUED | OUTPATIENT
Start: 2017-02-06 | End: 2017-02-07 | Stop reason: HOSPADM

## 2017-02-06 RX ADMIN — TRAZODONE HYDROCHLORIDE 50 MG: 50 TABLET ORAL at 21:07

## 2017-02-06 RX ADMIN — DEXTROSE MONOHYDRATE: 50 INJECTION, SOLUTION INTRAVENOUS at 18:33

## 2017-02-06 RX ADMIN — ONDANSETRON 4 MG: 2 INJECTION INTRAMUSCULAR; INTRAVENOUS at 11:08

## 2017-02-06 RX ADMIN — SODIUM CHLORIDE: 9 INJECTION, SOLUTION INTRAVENOUS at 05:49

## 2017-02-06 RX ADMIN — SUCRALFATE 1 G: 1 TABLET ORAL at 14:49

## 2017-02-06 RX ADMIN — GABAPENTIN 600 MG: 600 TABLET, FILM COATED ORAL at 21:07

## 2017-02-06 RX ADMIN — DOCUSATE SODIUM 100 MG: 100 CAPSULE, LIQUID FILLED ORAL at 21:07

## 2017-02-06 RX ADMIN — OXYBUTYNIN CHLORIDE 5 MG: 5 TABLET ORAL at 14:49

## 2017-02-06 RX ADMIN — MAGNESIUM HYDROXIDE 30 ML: 400 SUSPENSION ORAL at 11:08

## 2017-02-06 RX ADMIN — PRAVASTATIN SODIUM 10 MG: 20 TABLET ORAL at 21:07

## 2017-02-06 RX ADMIN — SUCRALFATE 1 G: 1 TABLET ORAL at 18:32

## 2017-02-06 RX ADMIN — TRIHEXYPHENIDYL HYDROCHLORIDE 5 MG: 2 TABLET ORAL at 14:49

## 2017-02-06 RX ADMIN — SUMATRIPTAN SUCCINATE 100 MG: 25 TABLET ORAL at 18:32

## 2017-02-06 RX ADMIN — SUCRALFATE 1 G: 1 TABLET ORAL at 21:06

## 2017-02-06 RX ADMIN — LEVOTHYROXINE SODIUM 75 MCG: 50 TABLET ORAL at 09:20

## 2017-02-06 RX ADMIN — SUCRALFATE 1 G: 1 TABLET ORAL at 09:21

## 2017-02-06 RX ADMIN — LISINOPRIL 20 MG: 20 TABLET ORAL at 09:21

## 2017-02-06 RX ADMIN — ENOXAPARIN SODIUM 40 MG: 40 INJECTION SUBCUTANEOUS at 09:21

## 2017-02-06 RX ADMIN — GABAPENTIN 600 MG: 600 TABLET, FILM COATED ORAL at 09:21

## 2017-02-06 RX ADMIN — OXYBUTYNIN CHLORIDE 5 MG: 5 TABLET ORAL at 09:20

## 2017-02-06 RX ADMIN — BECLOMETHASONE DIPROPIONATE 2 PUFF: 80 AEROSOL, METERED RESPIRATORY (INHALATION) at 21:06

## 2017-02-06 RX ADMIN — DONEPEZIL HYDROCHLORIDE 5 MG: 5 TABLET, FILM COATED ORAL at 21:07

## 2017-02-06 RX ADMIN — PANTOPRAZOLE SODIUM 40 MG: 40 TABLET, DELAYED RELEASE ORAL at 05:49

## 2017-02-06 RX ADMIN — MONTELUKAST SODIUM 10 MG: 10 TABLET ORAL at 09:21

## 2017-02-06 RX ADMIN — CEFTRIAXONE SODIUM 1 G: 1 INJECTION, POWDER, FOR SOLUTION INTRAMUSCULAR; INTRAVENOUS at 18:31

## 2017-02-06 RX ADMIN — LITHIUM CARBONATE 600 MG: 150 CAPSULE, GELATIN COATED ORAL at 09:20

## 2017-02-06 RX ADMIN — BECLOMETHASONE DIPROPIONATE 2 PUFF: 80 AEROSOL, METERED RESPIRATORY (INHALATION) at 09:56

## 2017-02-06 RX ADMIN — SODIUM CHLORIDE: 4.5 INJECTION, SOLUTION INTRAVENOUS at 09:46

## 2017-02-06 RX ADMIN — ESCITALOPRAM OXALATE 20 MG: 10 TABLET ORAL at 09:20

## 2017-02-06 RX ADMIN — SUMATRIPTAN SUCCINATE 100 MG: 25 TABLET ORAL at 06:40

## 2017-02-06 RX ADMIN — ARIPIPRAZOLE 30 MG: 30 TABLET ORAL at 21:08

## 2017-02-06 RX ADMIN — OXYBUTYNIN CHLORIDE 5 MG: 5 TABLET ORAL at 21:07

## 2017-02-06 RX ADMIN — TRIHEXYPHENIDYL HYDROCHLORIDE 5 MG: 2 TABLET ORAL at 09:18

## 2017-02-06 RX ADMIN — DOCUSATE SODIUM 100 MG: 100 CAPSULE, LIQUID FILLED ORAL at 11:08

## 2017-02-06 RX ADMIN — MAGNESIUM SULFATE HEPTAHYDRATE 2 G: 500 INJECTION, SOLUTION INTRAMUSCULAR; INTRAVENOUS at 11:08

## 2017-02-06 RX ADMIN — FENOFIBRATE 160 MG: 160 TABLET ORAL at 09:20

## 2017-02-06 RX ADMIN — TRIHEXYPHENIDYL HYDROCHLORIDE 5 MG: 2 TABLET ORAL at 21:06

## 2017-02-06 ASSESSMENT — PAIN SCALES - GENERAL
PAINLEVEL_OUTOF10: 9
PAINLEVEL_OUTOF10: 6

## 2017-02-07 VITALS
BODY MASS INDEX: 31.62 KG/M2 | DIASTOLIC BLOOD PRESSURE: 68 MMHG | HEIGHT: 63 IN | OXYGEN SATURATION: 95 % | TEMPERATURE: 97.8 F | WEIGHT: 178.44 LBS | RESPIRATION RATE: 16 BRPM | HEART RATE: 55 BPM | SYSTOLIC BLOOD PRESSURE: 118 MMHG

## 2017-02-07 PROBLEM — I10 ESSENTIAL HYPERTENSION: Chronic | Status: ACTIVE | Noted: 2017-02-07

## 2017-02-07 LAB
ANION GAP SERPL CALCULATED.3IONS-SCNC: 14 MMOL/L (ref 7–19)
BUN BLDV-MCNC: 11 MG/DL (ref 8–23)
CALCIUM SERPL-MCNC: 9 MG/DL (ref 8.8–10.2)
CHLORIDE BLD-SCNC: 108 MMOL/L (ref 98–111)
CO2: 24 MMOL/L (ref 22–29)
CREAT SERPL-MCNC: 1 MG/DL (ref 0.5–0.9)
EKG P AXIS: 41 DEGREES
EKG P AXIS: 63 DEGREES
EKG P-R INTERVAL: 176 MS
EKG P-R INTERVAL: 210 MS
EKG Q-T INTERVAL: 316 MS
EKG Q-T INTERVAL: 462 MS
EKG QRS DURATION: 80 MS
EKG QRS DURATION: 86 MS
EKG QTC CALCULATION (BAZETT): 393 MS
EKG QTC CALCULATION (BAZETT): 457 MS
EKG T AXIS: 45 DEGREES
EKG T AXIS: 56 DEGREES
GFR NON-AFRICAN AMERICAN: 56
GLUCOSE BLD-MCNC: 107 MG/DL (ref 74–109)
GLUCOSE BLD-MCNC: 126 MG/DL (ref 70–99)
GLUCOSE BLD-MCNC: 93 MG/DL (ref 70–99)
HCT VFR BLD CALC: 29.8 % (ref 37–47)
HEMOGLOBIN: 9.1 G/DL (ref 12–16)
MCH RBC QN AUTO: 27.9 PG (ref 27–31)
MCHC RBC AUTO-ENTMCNC: 30.5 G/DL (ref 33–37)
MCV RBC AUTO: 91.4 FL (ref 81–99)
PDW BLD-RTO: 13.4 % (ref 11.5–14.5)
PERFORMED ON: ABNORMAL
PERFORMED ON: NORMAL
PLATELET # BLD: 335 K/UL (ref 130–400)
PMV BLD AUTO: 8.8 FL (ref 7.4–10.4)
POTASSIUM SERPL-SCNC: 3.4 MMOL/L (ref 3.5–5)
RBC # BLD: 3.26 M/UL (ref 4.2–5.4)
SODIUM BLD-SCNC: 146 MMOL/L (ref 136–145)
WBC # BLD: 6.1 K/UL (ref 4.8–10.8)

## 2017-02-07 PROCEDURE — 6360000002 HC RX W HCPCS: Performed by: CLINICAL NURSE SPECIALIST

## 2017-02-07 PROCEDURE — 6370000000 HC RX 637 (ALT 250 FOR IP): Performed by: CLINICAL NURSE SPECIALIST

## 2017-02-07 PROCEDURE — 82948 REAGENT STRIP/BLOOD GLUCOSE: CPT

## 2017-02-07 PROCEDURE — 94762 N-INVAS EAR/PLS OXIMTRY CONT: CPT

## 2017-02-07 PROCEDURE — 6370000000 HC RX 637 (ALT 250 FOR IP): Performed by: INTERNAL MEDICINE

## 2017-02-07 PROCEDURE — G0378 HOSPITAL OBSERVATION PER HR: HCPCS

## 2017-02-07 PROCEDURE — 85027 COMPLETE CBC AUTOMATED: CPT

## 2017-02-07 PROCEDURE — 96372 THER/PROPH/DIAG INJ SC/IM: CPT

## 2017-02-07 PROCEDURE — 80048 BASIC METABOLIC PNL TOTAL CA: CPT

## 2017-02-07 PROCEDURE — 99217 PR OBSERVATION CARE DISCHARGE MANAGEMENT: CPT | Performed by: HOSPITALIST

## 2017-02-07 PROCEDURE — 6370000000 HC RX 637 (ALT 250 FOR IP): Performed by: NURSE PRACTITIONER

## 2017-02-07 PROCEDURE — 6370000000 HC RX 637 (ALT 250 FOR IP): Performed by: PHYSICIAN ASSISTANT

## 2017-02-07 PROCEDURE — 36415 COLL VENOUS BLD VENIPUNCTURE: CPT

## 2017-02-07 RX ORDER — POTASSIUM CHLORIDE 20MEQ/15ML
20 LIQUID (ML) ORAL ONCE
Status: COMPLETED | OUTPATIENT
Start: 2017-02-07 | End: 2017-02-07

## 2017-02-07 RX ORDER — CIPROFLOXACIN 500 MG/1
500 TABLET, FILM COATED ORAL 2 TIMES DAILY
Qty: 14 TABLET | Refills: 0 | Status: SHIPPED | OUTPATIENT
Start: 2017-02-07 | End: 2017-02-14

## 2017-02-07 RX ORDER — SUCRALFATE 1 G/1
1 TABLET ORAL 4 TIMES DAILY
Qty: 120 TABLET | Refills: 0 | Status: SHIPPED | OUTPATIENT
Start: 2017-02-07 | End: 2018-02-16

## 2017-02-07 RX ADMIN — LITHIUM CARBONATE 600 MG: 150 CAPSULE, GELATIN COATED ORAL at 08:46

## 2017-02-07 RX ADMIN — MONTELUKAST SODIUM 10 MG: 10 TABLET ORAL at 08:45

## 2017-02-07 RX ADMIN — SUCRALFATE 1 G: 1 TABLET ORAL at 14:27

## 2017-02-07 RX ADMIN — DOCUSATE SODIUM 100 MG: 100 CAPSULE, LIQUID FILLED ORAL at 08:47

## 2017-02-07 RX ADMIN — GABAPENTIN 600 MG: 600 TABLET, FILM COATED ORAL at 08:47

## 2017-02-07 RX ADMIN — BECLOMETHASONE DIPROPIONATE 2 PUFF: 80 AEROSOL, METERED RESPIRATORY (INHALATION) at 12:04

## 2017-02-07 RX ADMIN — SUCRALFATE 1 G: 1 TABLET ORAL at 08:47

## 2017-02-07 RX ADMIN — PANTOPRAZOLE SODIUM 40 MG: 40 TABLET, DELAYED RELEASE ORAL at 06:31

## 2017-02-07 RX ADMIN — LEVOTHYROXINE SODIUM 75 MCG: 50 TABLET ORAL at 08:46

## 2017-02-07 RX ADMIN — FENOFIBRATE 160 MG: 160 TABLET ORAL at 08:47

## 2017-02-07 RX ADMIN — TRIHEXYPHENIDYL HYDROCHLORIDE 5 MG: 2 TABLET ORAL at 08:46

## 2017-02-07 RX ADMIN — POTASSIUM CHLORIDE 20 MEQ: 20 SOLUTION ORAL at 15:06

## 2017-02-07 RX ADMIN — SUMATRIPTAN SUCCINATE 100 MG: 25 TABLET ORAL at 08:47

## 2017-02-07 RX ADMIN — MAGNESIUM HYDROXIDE 30 ML: 400 SUSPENSION ORAL at 08:45

## 2017-02-07 RX ADMIN — ENOXAPARIN SODIUM 40 MG: 40 INJECTION SUBCUTANEOUS at 08:47

## 2017-02-07 RX ADMIN — ESCITALOPRAM OXALATE 20 MG: 10 TABLET ORAL at 08:47

## 2017-02-07 RX ADMIN — OXYBUTYNIN CHLORIDE 5 MG: 5 TABLET ORAL at 14:27

## 2017-02-07 RX ADMIN — TRIHEXYPHENIDYL HYDROCHLORIDE 5 MG: 2 TABLET ORAL at 14:27

## 2017-02-07 RX ADMIN — LISINOPRIL 20 MG: 20 TABLET ORAL at 08:47

## 2017-02-07 ASSESSMENT — PAIN SCALES - GENERAL: PAINLEVEL_OUTOF10: 9

## 2017-02-09 LAB
BLOOD CULTURE, ROUTINE: NORMAL
CULTURE, BLOOD 2: NORMAL

## 2017-02-28 ENCOUNTER — HOSPITAL ENCOUNTER (INPATIENT)
Age: 62
LOS: 3 days | Discharge: PSYCHIATRIC OR SUBSTANCE USE FACILITY | DRG: 917 | End: 2017-03-03
Attending: EMERGENCY MEDICINE | Admitting: HOSPITALIST
Payer: MEDICARE

## 2017-02-28 ENCOUNTER — APPOINTMENT (OUTPATIENT)
Dept: GENERAL RADIOLOGY | Age: 62
DRG: 917 | End: 2017-02-28
Payer: MEDICARE

## 2017-02-28 DIAGNOSIS — T56.894A LITHIUM TOXICITY, UNDETERMINED INTENT, INITIAL ENCOUNTER: Primary | ICD-10-CM

## 2017-02-28 PROBLEM — E86.0 DEHYDRATION: Status: ACTIVE | Noted: 2017-02-28

## 2017-02-28 PROBLEM — T56.891A LITHIUM TOXICITY: Status: ACTIVE | Noted: 2017-02-28

## 2017-02-28 PROBLEM — N17.9 ACUTE KIDNEY INJURY (HCC): Status: ACTIVE | Noted: 2017-02-28

## 2017-02-28 LAB
ALBUMIN SERPL-MCNC: 4.2 G/DL (ref 3.5–5.2)
ALP BLD-CCNC: 79 U/L (ref 35–104)
ALT SERPL-CCNC: 12 U/L (ref 5–33)
ANION GAP SERPL CALCULATED.3IONS-SCNC: 12 MMOL/L (ref 7–19)
AST SERPL-CCNC: 16 U/L (ref 5–32)
BILIRUB SERPL-MCNC: 0.3 MG/DL (ref 0.2–1.2)
BILIRUBIN URINE: ABNORMAL
BLOOD, URINE: NEGATIVE
BUN BLDV-MCNC: 32 MG/DL (ref 8–23)
CALCIUM SERPL-MCNC: 9.3 MG/DL (ref 8.8–10.2)
CHLORIDE BLD-SCNC: 104 MMOL/L (ref 98–111)
CLARITY: CLEAR
CO2: 23 MMOL/L (ref 22–29)
COLOR: ABNORMAL
CREAT SERPL-MCNC: 2.6 MG/DL (ref 0.5–0.9)
GFR NON-AFRICAN AMERICAN: 19
GLOBULIN: 2.6 G/DL
GLUCOSE BLD-MCNC: 84 MG/DL (ref 74–109)
GLUCOSE URINE: NEGATIVE MG/DL
HCT VFR BLD CALC: 35.8 % (ref 37–47)
HEMOGLOBIN: 10.8 G/DL (ref 12–16)
KETONES, URINE: NEGATIVE MG/DL
LEUKOCYTE ESTERASE, URINE: NEGATIVE
LITHIUM LEVEL: 2.2 MMOL/L (ref 0.6–1.2)
LITHIUM LEVEL: 2.5 MMOL/L (ref 0.6–1.2)
MCH RBC QN AUTO: 27.2 PG (ref 27–31)
MCHC RBC AUTO-ENTMCNC: 30.2 G/DL (ref 33–37)
MCV RBC AUTO: 90.2 FL (ref 81–99)
NITRITE, URINE: NEGATIVE
PDW BLD-RTO: 14.6 % (ref 11.5–14.5)
PH UA: 5.5
PLATELET # BLD: 328 K/UL (ref 130–400)
PMV BLD AUTO: 9.4 FL (ref 7.4–10.4)
POTASSIUM SERPL-SCNC: 4.3 MMOL/L (ref 3.5–5)
PROTEIN UA: ABNORMAL MG/DL
RBC # BLD: 3.97 M/UL (ref 4.2–5.4)
SODIUM BLD-SCNC: 139 MMOL/L (ref 136–145)
SPECIFIC GRAVITY UA: 1.02
TOTAL CK: 31 U/L (ref 26–192)
TOTAL PROTEIN: 6.8 G/DL (ref 6.6–8.7)
UROBILINOGEN, URINE: 0.2 E.U./DL
WBC # BLD: 7.5 K/UL (ref 4.8–10.8)

## 2017-02-28 PROCEDURE — 2000000000 HC ICU R&B

## 2017-02-28 PROCEDURE — 36415 COLL VENOUS BLD VENIPUNCTURE: CPT

## 2017-02-28 PROCEDURE — 6360000002 HC RX W HCPCS: Performed by: HOSPITALIST

## 2017-02-28 PROCEDURE — 99223 1ST HOSP IP/OBS HIGH 75: CPT | Performed by: HOSPITALIST

## 2017-02-28 PROCEDURE — 85027 COMPLETE CBC AUTOMATED: CPT

## 2017-02-28 PROCEDURE — 80053 COMPREHEN METABOLIC PANEL: CPT

## 2017-02-28 PROCEDURE — 82550 ASSAY OF CK (CPK): CPT

## 2017-02-28 PROCEDURE — 81003 URINALYSIS AUTO W/O SCOPE: CPT

## 2017-02-28 PROCEDURE — 99285 EMERGENCY DEPT VISIT HI MDM: CPT

## 2017-02-28 PROCEDURE — 71010 XR CHEST PORTABLE: CPT

## 2017-02-28 PROCEDURE — 99284 EMERGENCY DEPT VISIT MOD MDM: CPT | Performed by: EMERGENCY MEDICINE

## 2017-02-28 PROCEDURE — 80178 ASSAY OF LITHIUM: CPT

## 2017-02-28 PROCEDURE — 2580000003 HC RX 258: Performed by: HOSPITALIST

## 2017-02-28 PROCEDURE — 93005 ELECTROCARDIOGRAM TRACING: CPT

## 2017-02-28 PROCEDURE — 2580000003 HC RX 258: Performed by: EMERGENCY MEDICINE

## 2017-02-28 RX ORDER — ONDANSETRON 2 MG/ML
4 INJECTION INTRAMUSCULAR; INTRAVENOUS EVERY 6 HOURS PRN
Status: DISCONTINUED | OUTPATIENT
Start: 2017-02-28 | End: 2017-03-03 | Stop reason: HOSPADM

## 2017-02-28 RX ORDER — SODIUM CHLORIDE 0.9 % (FLUSH) 0.9 %
10 SYRINGE (ML) INJECTION EVERY 12 HOURS SCHEDULED
Status: DISCONTINUED | OUTPATIENT
Start: 2017-02-28 | End: 2017-03-03 | Stop reason: HOSPADM

## 2017-02-28 RX ORDER — ACETAMINOPHEN 325 MG/1
650 TABLET ORAL EVERY 4 HOURS PRN
Status: DISCONTINUED | OUTPATIENT
Start: 2017-02-28 | End: 2017-03-03 | Stop reason: HOSPADM

## 2017-02-28 RX ORDER — HYDROCODONE BITARTRATE AND ACETAMINOPHEN 5; 325 MG/1; MG/1
1 TABLET ORAL EVERY 4 HOURS PRN
Status: DISCONTINUED | OUTPATIENT
Start: 2017-02-28 | End: 2017-03-03 | Stop reason: HOSPADM

## 2017-02-28 RX ORDER — SODIUM CHLORIDE 0.9 % (FLUSH) 0.9 %
10 SYRINGE (ML) INJECTION PRN
Status: DISCONTINUED | OUTPATIENT
Start: 2017-02-28 | End: 2017-03-03 | Stop reason: HOSPADM

## 2017-02-28 RX ORDER — MORPHINE SULFATE 4 MG/ML
2 INJECTION, SOLUTION INTRAMUSCULAR; INTRAVENOUS
Status: DISCONTINUED | OUTPATIENT
Start: 2017-02-28 | End: 2017-03-01

## 2017-02-28 RX ORDER — 0.9 % SODIUM CHLORIDE 0.9 %
1000 INTRAVENOUS SOLUTION INTRAVENOUS ONCE
Status: COMPLETED | OUTPATIENT
Start: 2017-02-28 | End: 2017-02-28

## 2017-02-28 RX ORDER — SODIUM CHLORIDE 9 MG/ML
INJECTION, SOLUTION INTRAVENOUS CONTINUOUS
Status: ACTIVE | OUTPATIENT
Start: 2017-02-28 | End: 2017-03-01

## 2017-02-28 RX ORDER — DOCUSATE SODIUM 100 MG/1
100 CAPSULE, LIQUID FILLED ORAL DAILY PRN
COMMUNITY
End: 2018-02-16

## 2017-02-28 RX ORDER — SODIUM CHLORIDE 9 MG/ML
INJECTION, SOLUTION INTRAVENOUS CONTINUOUS
Status: DISCONTINUED | OUTPATIENT
Start: 2017-02-28 | End: 2017-03-03 | Stop reason: HOSPADM

## 2017-02-28 RX ADMIN — SODIUM CHLORIDE 1000 ML: 9 INJECTION, SOLUTION INTRAVENOUS at 20:14

## 2017-02-28 RX ADMIN — SODIUM CHLORIDE: 9 INJECTION, SOLUTION INTRAVENOUS at 18:00

## 2017-02-28 RX ADMIN — ENOXAPARIN SODIUM 30 MG: 30 INJECTION SUBCUTANEOUS at 20:13

## 2017-02-28 RX ADMIN — Medication 10 ML: at 20:13

## 2017-02-28 RX ADMIN — SODIUM CHLORIDE 1000 ML: 9 INJECTION, SOLUTION INTRAVENOUS at 12:14

## 2017-02-28 RX ADMIN — SODIUM CHLORIDE: 9 INJECTION, SOLUTION INTRAVENOUS at 21:53

## 2017-02-28 ASSESSMENT — PAIN SCALES - GENERAL
PAINLEVEL_OUTOF10: 0
PAINLEVEL_OUTOF10: 0

## 2017-02-28 ASSESSMENT — ENCOUNTER SYMPTOMS
EYE PAIN: 0
EYE DISCHARGE: 0
EYES NEGATIVE: 1
ABDOMINAL DISTENTION: 0
EYE ITCHING: 0
COLOR CHANGE: 0
CHEST TIGHTNESS: 0
APNEA: 0
GASTROINTESTINAL NEGATIVE: 1
CHOKING: 0
ALLERGIC/IMMUNOLOGIC NEGATIVE: 1

## 2017-03-01 LAB
ALBUMIN SERPL-MCNC: 3.4 G/DL (ref 3.5–5.2)
ALP BLD-CCNC: 65 U/L (ref 35–104)
ALT SERPL-CCNC: 10 U/L (ref 5–33)
ANION GAP SERPL CALCULATED.3IONS-SCNC: 8 MMOL/L (ref 7–19)
ANION GAP SERPL CALCULATED.3IONS-SCNC: 9 MMOL/L (ref 7–19)
AST SERPL-CCNC: 11 U/L (ref 5–32)
BACTERIA: NEGATIVE /HPF
BASOPHILS ABSOLUTE: 0 K/UL (ref 0–0.2)
BASOPHILS RELATIVE PERCENT: 0 % (ref 0–1)
BILIRUB SERPL-MCNC: <0.2 MG/DL (ref 0.2–1.2)
BILIRUBIN URINE: NEGATIVE
BLOOD, URINE: ABNORMAL
BUN BLDV-MCNC: 18 MG/DL (ref 8–23)
BUN BLDV-MCNC: 22 MG/DL (ref 8–23)
CALCIUM SERPL-MCNC: 8.6 MG/DL (ref 8.8–10.2)
CALCIUM SERPL-MCNC: 8.8 MG/DL (ref 8.8–10.2)
CHLORIDE BLD-SCNC: 110 MMOL/L (ref 98–111)
CHLORIDE BLD-SCNC: 112 MMOL/L (ref 98–111)
CLARITY: CLEAR
CO2: 22 MMOL/L (ref 22–29)
CO2: 22 MMOL/L (ref 22–29)
COLOR: YELLOW
CREAT SERPL-MCNC: 1.2 MG/DL (ref 0.5–0.9)
CREAT SERPL-MCNC: 1.5 MG/DL (ref 0.5–0.9)
EOSINOPHILS ABSOLUTE: 0.3 K/UL (ref 0–0.6)
EOSINOPHILS RELATIVE PERCENT: 4.7 % (ref 0–5)
EPITHELIAL CELLS, UA: 0 /HPF (ref 0–5)
GFR NON-AFRICAN AMERICAN: 35
GFR NON-AFRICAN AMERICAN: 45
GLOBULIN: 1.6 G/DL
GLUCOSE BLD-MCNC: 89 MG/DL (ref 74–109)
GLUCOSE BLD-MCNC: 94 MG/DL (ref 74–109)
GLUCOSE URINE: NEGATIVE MG/DL
HCT VFR BLD CALC: 30.5 % (ref 37–47)
HEMOGLOBIN: 9.3 G/DL (ref 12–16)
HYALINE CASTS: 0 /HPF (ref 0–8)
KETONES, URINE: NEGATIVE MG/DL
LEUKOCYTE ESTERASE, URINE: NEGATIVE
LITHIUM LEVEL: 1.7 MMOL/L (ref 0.6–1.2)
LYMPHOCYTES ABSOLUTE: 2.1 K/UL (ref 1.1–4.5)
LYMPHOCYTES RELATIVE PERCENT: 28.6 % (ref 20–40)
MAGNESIUM: 1.7 MG/DL (ref 1.6–2.4)
MCH RBC QN AUTO: 27.6 PG (ref 27–31)
MCHC RBC AUTO-ENTMCNC: 30.5 G/DL (ref 33–37)
MCV RBC AUTO: 90.5 FL (ref 81–99)
MONOCYTES ABSOLUTE: 0.3 K/UL (ref 0–0.9)
MONOCYTES RELATIVE PERCENT: 3.7 % (ref 0–10)
NEUTROPHILS ABSOLUTE: 4.5 K/UL (ref 1.5–7.5)
NEUTROPHILS RELATIVE PERCENT: 62.6 % (ref 50–65)
NITRITE, URINE: NEGATIVE
PDW BLD-RTO: 14.6 % (ref 11.5–14.5)
PH UA: 6.5
PLATELET # BLD: 287 K/UL (ref 130–400)
PMV BLD AUTO: 9.4 FL (ref 7.4–10.4)
POTASSIUM SERPL-SCNC: 3.7 MMOL/L (ref 3.5–5)
POTASSIUM SERPL-SCNC: 4.3 MMOL/L (ref 3.5–5)
PROTEIN UA: NEGATIVE MG/DL
RBC # BLD: 3.37 M/UL (ref 4.2–5.4)
RBC UA: 3 /HPF (ref 0–4)
SODIUM BLD-SCNC: 140 MMOL/L (ref 136–145)
SODIUM BLD-SCNC: 143 MMOL/L (ref 136–145)
SPECIFIC GRAVITY UA: 1.01
TOTAL PROTEIN: 5 G/DL (ref 6.6–8.7)
UROBILINOGEN, URINE: 0.2 E.U./DL
WBC # BLD: 7.2 K/UL (ref 4.8–10.8)
WBC UA: 1 /HPF (ref 0–5)

## 2017-03-01 PROCEDURE — 2580000003 HC RX 258: Performed by: HOSPITALIST

## 2017-03-01 PROCEDURE — 80178 ASSAY OF LITHIUM: CPT

## 2017-03-01 PROCEDURE — 80048 BASIC METABOLIC PNL TOTAL CA: CPT

## 2017-03-01 PROCEDURE — 81001 URINALYSIS AUTO W/SCOPE: CPT

## 2017-03-01 PROCEDURE — 6370000000 HC RX 637 (ALT 250 FOR IP): Performed by: HOSPITALIST

## 2017-03-01 PROCEDURE — 6370000000 HC RX 637 (ALT 250 FOR IP): Performed by: FAMILY MEDICINE

## 2017-03-01 PROCEDURE — 99233 SBSQ HOSP IP/OBS HIGH 50: CPT | Performed by: HOSPITALIST

## 2017-03-01 PROCEDURE — 80053 COMPREHEN METABOLIC PANEL: CPT

## 2017-03-01 PROCEDURE — 1210000000 HC MED SURG R&B

## 2017-03-01 PROCEDURE — 85025 COMPLETE CBC W/AUTO DIFF WBC: CPT

## 2017-03-01 PROCEDURE — 36415 COLL VENOUS BLD VENIPUNCTURE: CPT

## 2017-03-01 PROCEDURE — 6360000002 HC RX W HCPCS: Performed by: HOSPITALIST

## 2017-03-01 PROCEDURE — 83735 ASSAY OF MAGNESIUM: CPT

## 2017-03-01 RX ORDER — ESCITALOPRAM OXALATE 10 MG/1
20 TABLET ORAL DAILY
Status: DISCONTINUED | OUTPATIENT
Start: 2017-03-01 | End: 2017-03-03 | Stop reason: HOSPADM

## 2017-03-01 RX ORDER — LEVOTHYROXINE SODIUM 0.05 MG/1
75 TABLET ORAL DAILY
Status: DISCONTINUED | OUTPATIENT
Start: 2017-03-01 | End: 2017-03-03 | Stop reason: HOSPADM

## 2017-03-01 RX ORDER — MONTELUKAST SODIUM 10 MG/1
10 TABLET ORAL DAILY
Status: DISCONTINUED | OUTPATIENT
Start: 2017-03-01 | End: 2017-03-03 | Stop reason: HOSPADM

## 2017-03-01 RX ORDER — SUCRALFATE 1 G/1
1 TABLET ORAL 4 TIMES DAILY
Status: DISCONTINUED | OUTPATIENT
Start: 2017-03-01 | End: 2017-03-03 | Stop reason: HOSPADM

## 2017-03-01 RX ORDER — TRAZODONE HYDROCHLORIDE 50 MG/1
50 TABLET ORAL NIGHTLY
Status: DISCONTINUED | OUTPATIENT
Start: 2017-03-01 | End: 2017-03-03 | Stop reason: HOSPADM

## 2017-03-01 RX ORDER — NYSTATIN 100000 U/G
CREAM TOPICAL 2 TIMES DAILY
Status: DISCONTINUED | OUTPATIENT
Start: 2017-03-01 | End: 2017-03-03 | Stop reason: HOSPADM

## 2017-03-01 RX ORDER — PANTOPRAZOLE SODIUM 40 MG/1
40 TABLET, DELAYED RELEASE ORAL
Status: DISCONTINUED | OUTPATIENT
Start: 2017-03-02 | End: 2017-03-03 | Stop reason: HOSPADM

## 2017-03-01 RX ORDER — GABAPENTIN 600 MG/1
600 TABLET ORAL 2 TIMES DAILY
Status: DISCONTINUED | OUTPATIENT
Start: 2017-03-01 | End: 2017-03-03 | Stop reason: HOSPADM

## 2017-03-01 RX ORDER — FENOFIBRATE 160 MG/1
160 TABLET ORAL DAILY
Status: DISCONTINUED | OUTPATIENT
Start: 2017-03-01 | End: 2017-03-03 | Stop reason: HOSPADM

## 2017-03-01 RX ORDER — DONEPEZIL HYDROCHLORIDE 5 MG/1
5 TABLET, FILM COATED ORAL NIGHTLY
Status: DISCONTINUED | OUTPATIENT
Start: 2017-03-01 | End: 2017-03-03 | Stop reason: HOSPADM

## 2017-03-01 RX ORDER — ARIPIPRAZOLE 30 MG/1
30 TABLET ORAL NIGHTLY
Status: DISCONTINUED | OUTPATIENT
Start: 2017-03-01 | End: 2017-03-03 | Stop reason: HOSPADM

## 2017-03-01 RX ORDER — TRIHEXYPHENIDYL HYDROCHLORIDE 2 MG/1
5 TABLET ORAL 3 TIMES DAILY
Status: DISCONTINUED | OUTPATIENT
Start: 2017-03-01 | End: 2017-03-03 | Stop reason: HOSPADM

## 2017-03-01 RX ORDER — DOCUSATE SODIUM 100 MG/1
100 CAPSULE, LIQUID FILLED ORAL DAILY PRN
Status: DISCONTINUED | OUTPATIENT
Start: 2017-03-01 | End: 2017-03-03 | Stop reason: HOSPADM

## 2017-03-01 RX ORDER — LISINOPRIL 20 MG/1
20 TABLET ORAL DAILY
Status: DISCONTINUED | OUTPATIENT
Start: 2017-03-01 | End: 2017-03-03 | Stop reason: HOSPADM

## 2017-03-01 RX ORDER — OXYBUTYNIN CHLORIDE 5 MG/1
5 TABLET ORAL 3 TIMES DAILY
Status: DISCONTINUED | OUTPATIENT
Start: 2017-03-01 | End: 2017-03-03 | Stop reason: HOSPADM

## 2017-03-01 RX ORDER — PRAVASTATIN SODIUM 20 MG
10 TABLET ORAL NIGHTLY
Status: DISCONTINUED | OUTPATIENT
Start: 2017-03-01 | End: 2017-03-03 | Stop reason: HOSPADM

## 2017-03-01 RX ADMIN — SUCRALFATE 1 G: 1 TABLET ORAL at 21:39

## 2017-03-01 RX ADMIN — TRIHEXYPHENIDYL HYDROCHLORIDE 5 MG: 2 TABLET ORAL at 17:41

## 2017-03-01 RX ADMIN — SUCRALFATE 1 G: 1 TABLET ORAL at 17:43

## 2017-03-01 RX ADMIN — PRAVASTATIN SODIUM 10 MG: 20 TABLET ORAL at 21:38

## 2017-03-01 RX ADMIN — FENOFIBRATE 160 MG: 160 TABLET ORAL at 17:41

## 2017-03-01 RX ADMIN — SODIUM CHLORIDE: 9 INJECTION, SOLUTION INTRAVENOUS at 01:46

## 2017-03-01 RX ADMIN — MONTELUKAST SODIUM 10 MG: 10 TABLET ORAL at 17:42

## 2017-03-01 RX ADMIN — GABAPENTIN 600 MG: 600 TABLET, FILM COATED ORAL at 21:38

## 2017-03-01 RX ADMIN — BECLOMETHASONE DIPROPIONATE 1 PUFF: 40 AEROSOL, METERED RESPIRATORY (INHALATION) at 21:37

## 2017-03-01 RX ADMIN — DONEPEZIL HYDROCHLORIDE 5 MG: 5 TABLET, FILM COATED ORAL at 21:48

## 2017-03-01 RX ADMIN — ARIPIPRAZOLE 30 MG: 30 TABLET ORAL at 21:38

## 2017-03-01 RX ADMIN — TRAZODONE HYDROCHLORIDE 50 MG: 50 TABLET ORAL at 21:38

## 2017-03-01 RX ADMIN — OXYBUTYNIN CHLORIDE 5 MG: 5 TABLET ORAL at 17:43

## 2017-03-01 RX ADMIN — LISINOPRIL 20 MG: 20 TABLET ORAL at 17:43

## 2017-03-01 RX ADMIN — SODIUM CHLORIDE: 9 INJECTION, SOLUTION INTRAVENOUS at 17:37

## 2017-03-01 RX ADMIN — TRIHEXYPHENIDYL HYDROCHLORIDE 5 MG: 2 TABLET ORAL at 21:38

## 2017-03-01 RX ADMIN — NYSTATIN: 100000 CREAM TOPICAL at 23:40

## 2017-03-01 RX ADMIN — SODIUM CHLORIDE: 9 INJECTION, SOLUTION INTRAVENOUS at 10:32

## 2017-03-01 RX ADMIN — ENOXAPARIN SODIUM 30 MG: 30 INJECTION SUBCUTANEOUS at 17:39

## 2017-03-01 RX ADMIN — LEVOTHYROXINE SODIUM 75 MCG: 50 TABLET ORAL at 17:42

## 2017-03-01 RX ADMIN — ESCITALOPRAM OXALATE 20 MG: 10 TABLET ORAL at 17:42

## 2017-03-01 RX ADMIN — OXYBUTYNIN CHLORIDE 5 MG: 5 TABLET ORAL at 21:38

## 2017-03-01 ASSESSMENT — PAIN SCALES - GENERAL
PAINLEVEL_OUTOF10: 0

## 2017-03-02 LAB
ANION GAP SERPL CALCULATED.3IONS-SCNC: 11 MMOL/L (ref 7–19)
BASOPHILS ABSOLUTE: 0 K/UL (ref 0–0.2)
BASOPHILS RELATIVE PERCENT: 0.2 % (ref 0–1)
BUN BLDV-MCNC: 8 MG/DL (ref 8–23)
CALCIUM SERPL-MCNC: 8.7 MG/DL (ref 8.8–10.2)
CHLORIDE BLD-SCNC: 110 MMOL/L (ref 98–111)
CO2: 20 MMOL/L (ref 22–29)
CREAT SERPL-MCNC: 0.8 MG/DL (ref 0.5–0.9)
EOSINOPHILS ABSOLUTE: 0.2 K/UL (ref 0–0.6)
EOSINOPHILS RELATIVE PERCENT: 3.3 % (ref 0–5)
GFR NON-AFRICAN AMERICAN: >60
GLUCOSE BLD-MCNC: 95 MG/DL (ref 74–109)
GLUCOSE BLD-MCNC: 97 MG/DL (ref 70–99)
HCT VFR BLD CALC: 28.3 % (ref 37–47)
HEMOGLOBIN: 8.9 G/DL (ref 12–16)
LITHIUM LEVEL: 1.2 MMOL/L (ref 0.6–1.2)
LYMPHOCYTES ABSOLUTE: 2 K/UL (ref 1.1–4.5)
LYMPHOCYTES RELATIVE PERCENT: 36.2 % (ref 20–40)
MCH RBC QN AUTO: 27.8 PG (ref 27–31)
MCHC RBC AUTO-ENTMCNC: 31.4 G/DL (ref 33–37)
MCV RBC AUTO: 88.4 FL (ref 81–99)
MONOCYTES ABSOLUTE: 0.3 K/UL (ref 0–0.9)
MONOCYTES RELATIVE PERCENT: 4.6 % (ref 0–10)
NEUTROPHILS ABSOLUTE: 3 K/UL (ref 1.5–7.5)
NEUTROPHILS RELATIVE PERCENT: 55.3 % (ref 50–65)
PDW BLD-RTO: 14.6 % (ref 11.5–14.5)
PERFORMED ON: NORMAL
PLATELET # BLD: 263 K/UL (ref 130–400)
PMV BLD AUTO: 8.9 FL (ref 7.4–10.4)
POTASSIUM SERPL-SCNC: 3.4 MMOL/L (ref 3.5–5)
RBC # BLD: 3.2 M/UL (ref 4.2–5.4)
SODIUM BLD-SCNC: 141 MMOL/L (ref 136–145)
WBC # BLD: 5.4 K/UL (ref 4.8–10.8)

## 2017-03-02 PROCEDURE — 80178 ASSAY OF LITHIUM: CPT

## 2017-03-02 PROCEDURE — 6370000000 HC RX 637 (ALT 250 FOR IP): Performed by: NURSE PRACTITIONER

## 2017-03-02 PROCEDURE — 6370000000 HC RX 637 (ALT 250 FOR IP): Performed by: FAMILY MEDICINE

## 2017-03-02 PROCEDURE — 99232 SBSQ HOSP IP/OBS MODERATE 35: CPT | Performed by: HOSPITALIST

## 2017-03-02 PROCEDURE — 2580000003 HC RX 258: Performed by: HOSPITALIST

## 2017-03-02 PROCEDURE — 36415 COLL VENOUS BLD VENIPUNCTURE: CPT

## 2017-03-02 PROCEDURE — 6370000000 HC RX 637 (ALT 250 FOR IP): Performed by: HOSPITALIST

## 2017-03-02 PROCEDURE — 85025 COMPLETE CBC W/AUTO DIFF WBC: CPT

## 2017-03-02 PROCEDURE — 82948 REAGENT STRIP/BLOOD GLUCOSE: CPT

## 2017-03-02 PROCEDURE — 1210000000 HC MED SURG R&B

## 2017-03-02 PROCEDURE — 80048 BASIC METABOLIC PNL TOTAL CA: CPT

## 2017-03-02 PROCEDURE — 6360000002 HC RX W HCPCS: Performed by: HOSPITALIST

## 2017-03-02 RX ORDER — POTASSIUM CHLORIDE 20 MEQ/1
20 TABLET, EXTENDED RELEASE ORAL ONCE
Status: COMPLETED | OUTPATIENT
Start: 2017-03-02 | End: 2017-03-02

## 2017-03-02 RX ORDER — SUMATRIPTAN 25 MG/1
100 TABLET, FILM COATED ORAL ONCE
Status: COMPLETED | OUTPATIENT
Start: 2017-03-02 | End: 2017-03-02

## 2017-03-02 RX ADMIN — LEVOTHYROXINE SODIUM 75 MCG: 50 TABLET ORAL at 08:17

## 2017-03-02 RX ADMIN — TRIHEXYPHENIDYL HYDROCHLORIDE 5 MG: 2 TABLET ORAL at 21:48

## 2017-03-02 RX ADMIN — Medication 10 ML: at 21:04

## 2017-03-02 RX ADMIN — SUCRALFATE 1 G: 1 TABLET ORAL at 08:16

## 2017-03-02 RX ADMIN — TRIHEXYPHENIDYL HYDROCHLORIDE 5 MG: 2 TABLET ORAL at 08:16

## 2017-03-02 RX ADMIN — NYSTATIN: 100000 CREAM TOPICAL at 21:03

## 2017-03-02 RX ADMIN — POTASSIUM CHLORIDE 20 MEQ: 20 TABLET, EXTENDED RELEASE ORAL at 21:03

## 2017-03-02 RX ADMIN — PANTOPRAZOLE SODIUM 40 MG: 40 TABLET, DELAYED RELEASE ORAL at 06:56

## 2017-03-02 RX ADMIN — SUMATRIPTAN SUCCINATE 100 MG: 25 TABLET ORAL at 12:20

## 2017-03-02 RX ADMIN — TRAZODONE HYDROCHLORIDE 50 MG: 50 TABLET ORAL at 21:04

## 2017-03-02 RX ADMIN — SUCRALFATE 1 G: 1 TABLET ORAL at 12:20

## 2017-03-02 RX ADMIN — MONTELUKAST SODIUM 10 MG: 10 TABLET ORAL at 08:16

## 2017-03-02 RX ADMIN — GABAPENTIN 600 MG: 600 TABLET, FILM COATED ORAL at 21:03

## 2017-03-02 RX ADMIN — DONEPEZIL HYDROCHLORIDE 5 MG: 5 TABLET, FILM COATED ORAL at 21:03

## 2017-03-02 RX ADMIN — OXYBUTYNIN CHLORIDE 5 MG: 5 TABLET ORAL at 08:18

## 2017-03-02 RX ADMIN — ENOXAPARIN SODIUM 40 MG: 40 INJECTION SUBCUTANEOUS at 16:59

## 2017-03-02 RX ADMIN — BECLOMETHASONE DIPROPIONATE 1 PUFF: 40 AEROSOL, METERED RESPIRATORY (INHALATION) at 08:19

## 2017-03-02 RX ADMIN — ESCITALOPRAM OXALATE 20 MG: 10 TABLET ORAL at 09:52

## 2017-03-02 RX ADMIN — OXYBUTYNIN CHLORIDE 5 MG: 5 TABLET ORAL at 21:03

## 2017-03-02 RX ADMIN — TRIHEXYPHENIDYL HYDROCHLORIDE 5 MG: 2 TABLET ORAL at 13:49

## 2017-03-02 RX ADMIN — GABAPENTIN 600 MG: 600 TABLET, FILM COATED ORAL at 08:15

## 2017-03-02 RX ADMIN — OXYBUTYNIN CHLORIDE 5 MG: 5 TABLET ORAL at 13:49

## 2017-03-02 RX ADMIN — SUCRALFATE 1 G: 1 TABLET ORAL at 16:59

## 2017-03-02 RX ADMIN — SODIUM CHLORIDE: 9 INJECTION, SOLUTION INTRAVENOUS at 05:51

## 2017-03-02 RX ADMIN — FENOFIBRATE 160 MG: 160 TABLET ORAL at 08:18

## 2017-03-02 RX ADMIN — ARIPIPRAZOLE 30 MG: 30 TABLET ORAL at 21:03

## 2017-03-02 RX ADMIN — POTASSIUM CHLORIDE 20 MEQ: 20 TABLET, EXTENDED RELEASE ORAL at 12:20

## 2017-03-02 RX ADMIN — NYSTATIN: 100000 CREAM TOPICAL at 08:19

## 2017-03-02 RX ADMIN — BECLOMETHASONE DIPROPIONATE 1 PUFF: 40 AEROSOL, METERED RESPIRATORY (INHALATION) at 21:03

## 2017-03-02 RX ADMIN — PRAVASTATIN SODIUM 10 MG: 20 TABLET ORAL at 21:03

## 2017-03-02 RX ADMIN — SUCRALFATE 1 G: 1 TABLET ORAL at 21:03

## 2017-03-02 RX ADMIN — LISINOPRIL 20 MG: 20 TABLET ORAL at 08:16

## 2017-03-02 ASSESSMENT — PAIN SCALES - GENERAL: PAINLEVEL_OUTOF10: 8

## 2017-03-03 ENCOUNTER — HOSPITAL ENCOUNTER (INPATIENT)
Age: 62
LOS: 4 days | Discharge: HOME | DRG: 885 | End: 2017-03-07
Attending: PSYCHIATRY & NEUROLOGY | Admitting: PSYCHIATRY & NEUROLOGY
Payer: MEDICARE

## 2017-03-03 VITALS
SYSTOLIC BLOOD PRESSURE: 127 MMHG | OXYGEN SATURATION: 100 % | WEIGHT: 180.3 LBS | HEIGHT: 64 IN | TEMPERATURE: 97.5 F | BODY MASS INDEX: 30.78 KG/M2 | DIASTOLIC BLOOD PRESSURE: 75 MMHG | HEART RATE: 54 BPM | RESPIRATION RATE: 18 BRPM

## 2017-03-03 PROBLEM — F05 DELIRIUM DUE TO MEDICAL CONDITION WITH BEHAVIORAL DISTURBANCE: Status: ACTIVE | Noted: 2017-03-03

## 2017-03-03 LAB
ANION GAP SERPL CALCULATED.3IONS-SCNC: 7 MMOL/L (ref 7–19)
BASOPHILS ABSOLUTE: 0 K/UL (ref 0–0.2)
BASOPHILS RELATIVE PERCENT: 0.2 % (ref 0–1)
BUN BLDV-MCNC: 6 MG/DL (ref 8–23)
CALCIUM SERPL-MCNC: 8.9 MG/DL (ref 8.8–10.2)
CHLORIDE BLD-SCNC: 112 MMOL/L (ref 98–111)
CO2: 22 MMOL/L (ref 22–29)
CREAT SERPL-MCNC: 0.9 MG/DL (ref 0.5–0.9)
EOSINOPHILS ABSOLUTE: 0.2 K/UL (ref 0–0.6)
EOSINOPHILS RELATIVE PERCENT: 3.6 % (ref 0–5)
GFR NON-AFRICAN AMERICAN: >60
GLUCOSE BLD-MCNC: 112 MG/DL (ref 74–109)
GLUCOSE BLD-MCNC: 94 MG/DL (ref 70–99)
GLUCOSE BLD-MCNC: 98 MG/DL (ref 70–99)
HCT VFR BLD CALC: 28 % (ref 37–47)
HEMOGLOBIN: 8.7 G/DL (ref 12–16)
LYMPHOCYTES ABSOLUTE: 2.5 K/UL (ref 1.1–4.5)
LYMPHOCYTES RELATIVE PERCENT: 45.7 % (ref 20–40)
MCH RBC QN AUTO: 27.8 PG (ref 27–31)
MCHC RBC AUTO-ENTMCNC: 31.1 G/DL (ref 33–37)
MCV RBC AUTO: 89.5 FL (ref 81–99)
MONOCYTES ABSOLUTE: 0.3 K/UL (ref 0–0.9)
MONOCYTES RELATIVE PERCENT: 4.9 % (ref 0–10)
NEUTROPHILS ABSOLUTE: 2.5 K/UL (ref 1.5–7.5)
NEUTROPHILS RELATIVE PERCENT: 44.5 % (ref 50–65)
PDW BLD-RTO: 14.9 % (ref 11.5–14.5)
PERFORMED ON: NORMAL
PERFORMED ON: NORMAL
PLATELET # BLD: 265 K/UL (ref 130–400)
PMV BLD AUTO: 9 FL (ref 7.4–10.4)
POTASSIUM SERPL-SCNC: 4.1 MMOL/L (ref 3.5–5)
RBC # BLD: 3.13 M/UL (ref 4.2–5.4)
SODIUM BLD-SCNC: 141 MMOL/L (ref 136–145)
WBC # BLD: 5.5 K/UL (ref 4.8–10.8)

## 2017-03-03 PROCEDURE — 82948 REAGENT STRIP/BLOOD GLUCOSE: CPT

## 2017-03-03 PROCEDURE — 6370000000 HC RX 637 (ALT 250 FOR IP): Performed by: PSYCHIATRY & NEUROLOGY

## 2017-03-03 PROCEDURE — 99238 HOSP IP/OBS DSCHRG MGMT 30/<: CPT | Performed by: HOSPITALIST

## 2017-03-03 PROCEDURE — 6370000000 HC RX 637 (ALT 250 FOR IP): Performed by: HOSPITALIST

## 2017-03-03 PROCEDURE — 6370000000 HC RX 637 (ALT 250 FOR IP): Performed by: NURSE PRACTITIONER

## 2017-03-03 PROCEDURE — 36415 COLL VENOUS BLD VENIPUNCTURE: CPT

## 2017-03-03 PROCEDURE — 85025 COMPLETE CBC W/AUTO DIFF WBC: CPT

## 2017-03-03 PROCEDURE — 1240000000 HC EMOTIONAL WELLNESS R&B

## 2017-03-03 PROCEDURE — 80048 BASIC METABOLIC PNL TOTAL CA: CPT

## 2017-03-03 PROCEDURE — 99221 1ST HOSP IP/OBS SF/LOW 40: CPT | Performed by: PSYCHIATRY & NEUROLOGY

## 2017-03-03 PROCEDURE — 2580000003 HC RX 258: Performed by: HOSPITALIST

## 2017-03-03 RX ORDER — DONEPEZIL HYDROCHLORIDE 5 MG/1
5 TABLET, FILM COATED ORAL NIGHTLY
Status: CANCELLED | OUTPATIENT
Start: 2017-03-03

## 2017-03-03 RX ORDER — OXYBUTYNIN CHLORIDE 5 MG/1
5 TABLET ORAL 3 TIMES DAILY
Status: CANCELLED | OUTPATIENT
Start: 2017-03-03

## 2017-03-03 RX ORDER — DOCUSATE SODIUM 100 MG/1
100 CAPSULE, LIQUID FILLED ORAL DAILY PRN
Status: DISCONTINUED | OUTPATIENT
Start: 2017-03-03 | End: 2017-03-03 | Stop reason: SDUPTHER

## 2017-03-03 RX ORDER — ARIPIPRAZOLE 30 MG/1
30 TABLET ORAL NIGHTLY
Status: DISCONTINUED | OUTPATIENT
Start: 2017-03-03 | End: 2017-03-03 | Stop reason: SDUPTHER

## 2017-03-03 RX ORDER — HYDROCODONE BITARTRATE AND ACETAMINOPHEN 5; 325 MG/1; MG/1
1 TABLET ORAL EVERY 4 HOURS PRN
Status: CANCELLED | OUTPATIENT
Start: 2017-03-03

## 2017-03-03 RX ORDER — SUCRALFATE 1 G/1
1 TABLET ORAL 4 TIMES DAILY
Status: CANCELLED | OUTPATIENT
Start: 2017-03-03

## 2017-03-03 RX ORDER — NYSTATIN 100000 U/G
CREAM TOPICAL 2 TIMES DAILY
Status: CANCELLED | OUTPATIENT
Start: 2017-03-03

## 2017-03-03 RX ORDER — ESCITALOPRAM OXALATE 10 MG/1
20 TABLET ORAL DAILY
Status: DISCONTINUED | OUTPATIENT
Start: 2017-03-04 | End: 2017-03-06

## 2017-03-03 RX ORDER — ACETAMINOPHEN 325 MG/1
650 TABLET ORAL EVERY 4 HOURS PRN
Status: CANCELLED | OUTPATIENT
Start: 2017-03-03

## 2017-03-03 RX ORDER — ONDANSETRON 2 MG/ML
4 INJECTION INTRAMUSCULAR; INTRAVENOUS EVERY 6 HOURS PRN
Status: DISCONTINUED | OUTPATIENT
Start: 2017-03-03 | End: 2017-03-03

## 2017-03-03 RX ORDER — FENOFIBRATE 160 MG/1
160 TABLET ORAL DAILY
Status: DISCONTINUED | OUTPATIENT
Start: 2017-03-03 | End: 2017-03-03 | Stop reason: SDUPTHER

## 2017-03-03 RX ORDER — TRAZODONE HYDROCHLORIDE 50 MG/1
50 TABLET ORAL NIGHTLY
Status: DISCONTINUED | OUTPATIENT
Start: 2017-03-03 | End: 2017-03-07 | Stop reason: HOSPADM

## 2017-03-03 RX ORDER — DOCUSATE SODIUM 100 MG/1
100 CAPSULE, LIQUID FILLED ORAL DAILY PRN
Status: DISCONTINUED | OUTPATIENT
Start: 2017-03-03 | End: 2017-03-07 | Stop reason: HOSPADM

## 2017-03-03 RX ORDER — PRAVASTATIN SODIUM 20 MG
10 TABLET ORAL NIGHTLY
Status: CANCELLED | OUTPATIENT
Start: 2017-03-03

## 2017-03-03 RX ORDER — TRIHEXYPHENIDYL HYDROCHLORIDE 2 MG/1
5 TABLET ORAL 3 TIMES DAILY
Status: CANCELLED | OUTPATIENT
Start: 2017-03-03

## 2017-03-03 RX ORDER — TRIHEXYPHENIDYL HYDROCHLORIDE 5 MG/1
5 TABLET ORAL 3 TIMES DAILY
Status: DISCONTINUED | OUTPATIENT
Start: 2017-03-03 | End: 2017-03-06

## 2017-03-03 RX ORDER — MONTELUKAST SODIUM 10 MG/1
10 TABLET ORAL DAILY
Status: CANCELLED | OUTPATIENT
Start: 2017-03-04

## 2017-03-03 RX ORDER — HYDROCODONE BITARTRATE AND ACETAMINOPHEN 5; 325 MG/1; MG/1
1 TABLET ORAL EVERY 4 HOURS PRN
Status: DISCONTINUED | OUTPATIENT
Start: 2017-03-03 | End: 2017-03-03

## 2017-03-03 RX ORDER — ARIPIPRAZOLE 30 MG/1
30 TABLET ORAL NIGHTLY
Status: CANCELLED | OUTPATIENT
Start: 2017-03-03

## 2017-03-03 RX ORDER — GABAPENTIN 600 MG/1
600 TABLET ORAL 2 TIMES DAILY
Status: DISCONTINUED | OUTPATIENT
Start: 2017-03-03 | End: 2017-03-03 | Stop reason: SDUPTHER

## 2017-03-03 RX ORDER — ESCITALOPRAM OXALATE 10 MG/1
10 TABLET ORAL DAILY
Status: DISCONTINUED | OUTPATIENT
Start: 2017-03-04 | End: 2017-03-03 | Stop reason: SDUPTHER

## 2017-03-03 RX ORDER — PRAVASTATIN SODIUM 20 MG
10 TABLET ORAL NIGHTLY
Status: DISCONTINUED | OUTPATIENT
Start: 2017-03-03 | End: 2017-03-03

## 2017-03-03 RX ORDER — PANTOPRAZOLE SODIUM 20 MG/1
20 TABLET, DELAYED RELEASE ORAL
Status: DISCONTINUED | OUTPATIENT
Start: 2017-03-04 | End: 2017-03-07 | Stop reason: HOSPADM

## 2017-03-03 RX ORDER — FENOFIBRATE 160 MG/1
160 TABLET ORAL DAILY
Status: DISCONTINUED | OUTPATIENT
Start: 2017-03-04 | End: 2017-03-07 | Stop reason: HOSPADM

## 2017-03-03 RX ORDER — PANTOPRAZOLE SODIUM 40 MG/1
40 TABLET, DELAYED RELEASE ORAL
Status: DISCONTINUED | OUTPATIENT
Start: 2017-03-04 | End: 2017-03-03

## 2017-03-03 RX ORDER — ARIPIPRAZOLE 30 MG/1
30 TABLET ORAL NIGHTLY
Status: DISCONTINUED | OUTPATIENT
Start: 2017-03-03 | End: 2017-03-03

## 2017-03-03 RX ORDER — OXYBUTYNIN CHLORIDE 5 MG/1
5 TABLET ORAL 3 TIMES DAILY
Status: DISCONTINUED | OUTPATIENT
Start: 2017-03-03 | End: 2017-03-03

## 2017-03-03 RX ORDER — ACETAMINOPHEN 325 MG/1
650 TABLET ORAL EVERY 4 HOURS PRN
Status: DISCONTINUED | OUTPATIENT
Start: 2017-03-03 | End: 2017-03-07 | Stop reason: HOSPADM

## 2017-03-03 RX ORDER — SUCRALFATE 1 G/1
1 TABLET ORAL 4 TIMES DAILY
Status: DISCONTINUED | OUTPATIENT
Start: 2017-03-03 | End: 2017-03-07 | Stop reason: HOSPADM

## 2017-03-03 RX ORDER — ESCITALOPRAM OXALATE 10 MG/1
20 TABLET ORAL DAILY
Status: CANCELLED | OUTPATIENT
Start: 2017-03-04

## 2017-03-03 RX ORDER — LEVOTHYROXINE SODIUM 0.07 MG/1
75 TABLET ORAL DAILY
Status: DISCONTINUED | OUTPATIENT
Start: 2017-03-03 | End: 2017-03-03 | Stop reason: SDUPTHER

## 2017-03-03 RX ORDER — FENOFIBRATE 160 MG/1
160 TABLET ORAL DAILY
Status: CANCELLED | OUTPATIENT
Start: 2017-03-04

## 2017-03-03 RX ORDER — GABAPENTIN 600 MG/1
600 TABLET ORAL 2 TIMES DAILY
Status: DISCONTINUED | OUTPATIENT
Start: 2017-03-03 | End: 2017-03-07 | Stop reason: HOSPADM

## 2017-03-03 RX ORDER — DONEPEZIL HYDROCHLORIDE 5 MG/1
5 TABLET, FILM COATED ORAL NIGHTLY
Status: DISCONTINUED | OUTPATIENT
Start: 2017-03-03 | End: 2017-03-03

## 2017-03-03 RX ORDER — MONTELUKAST SODIUM 10 MG/1
10 TABLET ORAL DAILY
Status: DISCONTINUED | OUTPATIENT
Start: 2017-03-03 | End: 2017-03-03 | Stop reason: SDUPTHER

## 2017-03-03 RX ORDER — GABAPENTIN 600 MG/1
600 TABLET ORAL 2 TIMES DAILY
Status: CANCELLED | OUTPATIENT
Start: 2017-03-03

## 2017-03-03 RX ORDER — ONDANSETRON 2 MG/ML
4 INJECTION INTRAMUSCULAR; INTRAVENOUS EVERY 6 HOURS PRN
Status: CANCELLED | OUTPATIENT
Start: 2017-03-03

## 2017-03-03 RX ORDER — LEVOTHYROXINE SODIUM 0.07 MG/1
75 TABLET ORAL DAILY
Status: DISCONTINUED | OUTPATIENT
Start: 2017-03-04 | End: 2017-03-07 | Stop reason: HOSPADM

## 2017-03-03 RX ORDER — LEVOTHYROXINE SODIUM 0.05 MG/1
75 TABLET ORAL DAILY
Status: CANCELLED | OUTPATIENT
Start: 2017-03-04

## 2017-03-03 RX ORDER — NYSTATIN 100000 U/G
CREAM TOPICAL 2 TIMES DAILY
Status: DISCONTINUED | OUTPATIENT
Start: 2017-03-03 | End: 2017-03-07 | Stop reason: HOSPADM

## 2017-03-03 RX ORDER — TRAZODONE HYDROCHLORIDE 50 MG/1
50 TABLET ORAL NIGHTLY
Status: CANCELLED | OUTPATIENT
Start: 2017-03-03

## 2017-03-03 RX ORDER — PANTOPRAZOLE SODIUM 40 MG/1
40 TABLET, DELAYED RELEASE ORAL
Status: CANCELLED | OUTPATIENT
Start: 2017-03-04

## 2017-03-03 RX ORDER — LISINOPRIL 20 MG/1
20 TABLET ORAL DAILY
Status: CANCELLED | OUTPATIENT
Start: 2017-03-04

## 2017-03-03 RX ORDER — LISINOPRIL 20 MG/1
20 TABLET ORAL DAILY
Status: DISCONTINUED | OUTPATIENT
Start: 2017-03-04 | End: 2017-03-07 | Stop reason: HOSPADM

## 2017-03-03 RX ORDER — ACETAMINOPHEN 325 MG/1
650 TABLET ORAL EVERY 4 HOURS PRN
Status: DISCONTINUED | OUTPATIENT
Start: 2017-03-03 | End: 2017-03-03 | Stop reason: SDUPTHER

## 2017-03-03 RX ORDER — LISINOPRIL 20 MG/1
20 TABLET ORAL DAILY
Status: DISCONTINUED | OUTPATIENT
Start: 2017-03-03 | End: 2017-03-03 | Stop reason: SDUPTHER

## 2017-03-03 RX ORDER — DOCUSATE SODIUM 100 MG/1
100 CAPSULE, LIQUID FILLED ORAL DAILY PRN
Status: CANCELLED | OUTPATIENT
Start: 2017-03-03

## 2017-03-03 RX ORDER — MONTELUKAST SODIUM 10 MG/1
10 TABLET ORAL DAILY
Status: DISCONTINUED | OUTPATIENT
Start: 2017-03-04 | End: 2017-03-07 | Stop reason: HOSPADM

## 2017-03-03 RX ORDER — DONEPEZIL HYDROCHLORIDE 5 MG/1
5 TABLET, FILM COATED ORAL NIGHTLY
Status: DISCONTINUED | OUTPATIENT
Start: 2017-03-03 | End: 2017-03-03 | Stop reason: SDUPTHER

## 2017-03-03 RX ADMIN — TRIHEXYPHENIDYL HYDROCHLORIDE 5 MG: 5 TABLET ORAL at 20:28

## 2017-03-03 RX ADMIN — NYSTATIN: 100000 CREAM TOPICAL at 20:26

## 2017-03-03 RX ADMIN — BECLOMETHASONE DIPROPIONATE 1 PUFF: 40 AEROSOL, METERED RESPIRATORY (INHALATION) at 10:10

## 2017-03-03 RX ADMIN — METFORMIN HYDROCHLORIDE 500 MG: 500 TABLET, FILM COATED ORAL at 20:28

## 2017-03-03 RX ADMIN — OXYBUTYNIN CHLORIDE 5 MG: 5 TABLET ORAL at 08:33

## 2017-03-03 RX ADMIN — ESCITALOPRAM OXALATE 20 MG: 10 TABLET ORAL at 10:09

## 2017-03-03 RX ADMIN — GABAPENTIN 600 MG: 600 TABLET, FILM COATED ORAL at 20:28

## 2017-03-03 RX ADMIN — NYSTATIN: 100000 CREAM TOPICAL at 10:10

## 2017-03-03 RX ADMIN — LEVOTHYROXINE SODIUM 75 MCG: 50 TABLET ORAL at 08:32

## 2017-03-03 RX ADMIN — MONTELUKAST SODIUM 10 MG: 10 TABLET ORAL at 10:09

## 2017-03-03 RX ADMIN — SUCRALFATE 1 G: 1 TABLET ORAL at 15:20

## 2017-03-03 RX ADMIN — TRAZODONE HYDROCHLORIDE 50 MG: 50 TABLET ORAL at 20:28

## 2017-03-03 RX ADMIN — LISINOPRIL 20 MG: 20 TABLET ORAL at 08:32

## 2017-03-03 RX ADMIN — GABAPENTIN 600 MG: 600 TABLET, FILM COATED ORAL at 08:32

## 2017-03-03 RX ADMIN — SUCRALFATE 1 G: 1 TABLET ORAL at 08:33

## 2017-03-03 RX ADMIN — BECLOMETHASONE DIPROPIONATE 1 PUFF: 40 AEROSOL, METERED RESPIRATORY (INHALATION) at 20:25

## 2017-03-03 RX ADMIN — Medication 10 ML: at 10:14

## 2017-03-03 RX ADMIN — SUCRALFATE 1 G: 1 TABLET ORAL at 20:28

## 2017-03-03 RX ADMIN — TRIHEXYPHENIDYL HYDROCHLORIDE 5 MG: 2 TABLET ORAL at 08:32

## 2017-03-03 RX ADMIN — TRIHEXYPHENIDYL HYDROCHLORIDE 5 MG: 2 TABLET ORAL at 15:19

## 2017-03-03 RX ADMIN — OXYBUTYNIN CHLORIDE 5 MG: 5 TABLET ORAL at 15:20

## 2017-03-03 RX ADMIN — FENOFIBRATE 160 MG: 160 TABLET ORAL at 08:31

## 2017-03-03 RX ADMIN — PANTOPRAZOLE SODIUM 40 MG: 40 TABLET, DELAYED RELEASE ORAL at 06:14

## 2017-03-03 ASSESSMENT — LIFESTYLE VARIABLES: HISTORY_ALCOHOL_USE: NO

## 2017-03-03 ASSESSMENT — SLEEP AND FATIGUE QUESTIONNAIRES
DIFFICULTY FALLING ASLEEP: YES
DO YOU HAVE DIFFICULTY SLEEPING: YES
SLEEP PATTERN: DIFFICULTY FALLING ASLEEP;RESTLESSNESS
DO YOU USE A SLEEP AID: YES
RESTFUL SLEEP: NO
DIFFICULTY STAYING ASLEEP: YES
AVERAGE NUMBER OF SLEEP HOURS: 6
DIFFICULTY ARISING: NO

## 2017-03-03 ASSESSMENT — PATIENT HEALTH QUESTIONNAIRE - PHQ9: SUM OF ALL RESPONSES TO PHQ QUESTIONS 1-9: 15

## 2017-03-03 ASSESSMENT — PAIN SCALES - GENERAL: PAINLEVEL_OUTOF10: 0

## 2017-03-04 LAB
BASOPHILS ABSOLUTE: 0 K/UL (ref 0–0.2)
BASOPHILS RELATIVE PERCENT: 0.2 % (ref 0–1)
CHOLESTEROL, TOTAL: 101 MG/DL (ref 160–199)
EOSINOPHILS ABSOLUTE: 0.2 K/UL (ref 0–0.6)
EOSINOPHILS RELATIVE PERCENT: 3.2 % (ref 0–5)
GLUCOSE BLD-MCNC: 77 MG/DL (ref 70–99)
GLUCOSE BLD-MCNC: 81 MG/DL (ref 70–99)
GLUCOSE BLD-MCNC: 81 MG/DL (ref 70–99)
GLUCOSE BLD-MCNC: 90 MG/DL (ref 70–99)
HBA1C MFR BLD: 5.8 %
HCT VFR BLD CALC: 33 % (ref 37–47)
HDLC SERPL-MCNC: 40 MG/DL (ref 65–121)
HEMOGLOBIN: 10 G/DL (ref 12–16)
LDL CHOLESTEROL CALCULATED: 39 MG/DL
LITHIUM LEVEL: 0.7 MMOL/L (ref 0.6–1.2)
LYMPHOCYTES ABSOLUTE: 2 K/UL (ref 1.1–4.5)
LYMPHOCYTES RELATIVE PERCENT: 33.3 % (ref 20–40)
MCH RBC QN AUTO: 27 PG (ref 27–31)
MCHC RBC AUTO-ENTMCNC: 30.3 G/DL (ref 33–37)
MCV RBC AUTO: 88.9 FL (ref 81–99)
MONOCYTES ABSOLUTE: 0.2 K/UL (ref 0–0.9)
MONOCYTES RELATIVE PERCENT: 4.1 % (ref 0–10)
NEUTROPHILS ABSOLUTE: 3.4 K/UL (ref 1.5–7.5)
NEUTROPHILS RELATIVE PERCENT: 58 % (ref 50–65)
PDW BLD-RTO: 14.7 % (ref 11.5–14.5)
PERFORMED ON: NORMAL
PLATELET # BLD: 312 K/UL (ref 130–400)
PMV BLD AUTO: 9.1 FL (ref 7.4–10.4)
RBC # BLD: 3.71 M/UL (ref 4.2–5.4)
T4 FREE: 1.2 NG/ML (ref 0.9–1.7)
TRIGL SERPL-MCNC: 110 MG/DL (ref 150–199)
TSH SERPL DL<=0.05 MIU/L-ACNC: 3.77 UIU/ML (ref 0.27–4.2)
WBC # BLD: 5.9 K/UL (ref 4.8–10.8)

## 2017-03-04 PROCEDURE — 80178 ASSAY OF LITHIUM: CPT

## 2017-03-04 PROCEDURE — 1240000000 HC EMOTIONAL WELLNESS R&B

## 2017-03-04 PROCEDURE — 84439 ASSAY OF FREE THYROXINE: CPT

## 2017-03-04 PROCEDURE — 82948 REAGENT STRIP/BLOOD GLUCOSE: CPT

## 2017-03-04 PROCEDURE — 90792 PSYCH DIAG EVAL W/MED SRVCS: CPT | Performed by: NURSE PRACTITIONER

## 2017-03-04 PROCEDURE — 99999 PR OFFICE/OUTPT VISIT,PROCEDURE ONLY: CPT | Performed by: NURSE PRACTITIONER

## 2017-03-04 PROCEDURE — 6370000000 HC RX 637 (ALT 250 FOR IP): Performed by: PSYCHIATRY & NEUROLOGY

## 2017-03-04 PROCEDURE — 36415 COLL VENOUS BLD VENIPUNCTURE: CPT

## 2017-03-04 PROCEDURE — 85025 COMPLETE CBC W/AUTO DIFF WBC: CPT

## 2017-03-04 PROCEDURE — 80061 LIPID PANEL: CPT

## 2017-03-04 PROCEDURE — 83036 HEMOGLOBIN GLYCOSYLATED A1C: CPT

## 2017-03-04 PROCEDURE — 6370000000 HC RX 637 (ALT 250 FOR IP): Performed by: NURSE PRACTITIONER

## 2017-03-04 PROCEDURE — 84443 ASSAY THYROID STIM HORMONE: CPT

## 2017-03-04 RX ADMIN — TRIHEXYPHENIDYL HYDROCHLORIDE 5 MG: 5 TABLET ORAL at 08:39

## 2017-03-04 RX ADMIN — TRAZODONE HYDROCHLORIDE 50 MG: 50 TABLET ORAL at 21:28

## 2017-03-04 RX ADMIN — BECLOMETHASONE DIPROPIONATE 1 PUFF: 40 AEROSOL, METERED RESPIRATORY (INHALATION) at 21:25

## 2017-03-04 RX ADMIN — SUCRALFATE 1 G: 1 TABLET ORAL at 17:12

## 2017-03-04 RX ADMIN — LEVOTHYROXINE SODIUM 75 MCG: 75 TABLET ORAL at 08:39

## 2017-03-04 RX ADMIN — SUCRALFATE 1 G: 1 TABLET ORAL at 13:09

## 2017-03-04 RX ADMIN — LISINOPRIL 20 MG: 20 TABLET ORAL at 08:39

## 2017-03-04 RX ADMIN — ESCITALOPRAM OXALATE 20 MG: 10 TABLET ORAL at 08:38

## 2017-03-04 RX ADMIN — DOCUSATE SODIUM 100 MG: 100 CAPSULE, LIQUID FILLED ORAL at 21:27

## 2017-03-04 RX ADMIN — TRIHEXYPHENIDYL HYDROCHLORIDE 5 MG: 5 TABLET ORAL at 21:27

## 2017-03-04 RX ADMIN — METFORMIN HYDROCHLORIDE 500 MG: 500 TABLET, FILM COATED ORAL at 17:12

## 2017-03-04 RX ADMIN — GABAPENTIN 600 MG: 600 TABLET, FILM COATED ORAL at 08:38

## 2017-03-04 RX ADMIN — SUCRALFATE 1 G: 1 TABLET ORAL at 21:28

## 2017-03-04 RX ADMIN — PANTOPRAZOLE SODIUM 20 MG: 20 TABLET, DELAYED RELEASE ORAL at 06:21

## 2017-03-04 RX ADMIN — FENOFIBRATE 160 MG: 160 TABLET ORAL at 08:38

## 2017-03-04 RX ADMIN — MONTELUKAST SODIUM 10 MG: 10 TABLET ORAL at 08:38

## 2017-03-04 RX ADMIN — TRIHEXYPHENIDYL HYDROCHLORIDE 5 MG: 5 TABLET ORAL at 13:08

## 2017-03-04 RX ADMIN — GABAPENTIN 600 MG: 600 TABLET, FILM COATED ORAL at 21:27

## 2017-03-04 RX ADMIN — SUCRALFATE 1 G: 1 TABLET ORAL at 08:39

## 2017-03-04 RX ADMIN — BECLOMETHASONE DIPROPIONATE 1 PUFF: 40 AEROSOL, METERED RESPIRATORY (INHALATION) at 08:50

## 2017-03-04 RX ADMIN — METFORMIN HYDROCHLORIDE 500 MG: 500 TABLET, FILM COATED ORAL at 08:38

## 2017-03-04 ASSESSMENT — ENCOUNTER SYMPTOMS
RESPIRATORY NEGATIVE: 1
EYES NEGATIVE: 1
GASTROINTESTINAL NEGATIVE: 1

## 2017-03-04 ASSESSMENT — PAIN SCALES - GENERAL: PAINLEVEL_OUTOF10: 8

## 2017-03-05 LAB
GLUCOSE BLD-MCNC: 101 MG/DL (ref 70–99)
GLUCOSE BLD-MCNC: 108 MG/DL (ref 70–99)
GLUCOSE BLD-MCNC: 81 MG/DL (ref 70–99)
GLUCOSE BLD-MCNC: 90 MG/DL (ref 70–99)
PERFORMED ON: ABNORMAL
PERFORMED ON: ABNORMAL
PERFORMED ON: NORMAL
PERFORMED ON: NORMAL

## 2017-03-05 PROCEDURE — 6370000000 HC RX 637 (ALT 250 FOR IP): Performed by: NURSE PRACTITIONER

## 2017-03-05 PROCEDURE — 6370000000 HC RX 637 (ALT 250 FOR IP): Performed by: PSYCHIATRY & NEUROLOGY

## 2017-03-05 PROCEDURE — 82948 REAGENT STRIP/BLOOD GLUCOSE: CPT

## 2017-03-05 PROCEDURE — 1240000000 HC EMOTIONAL WELLNESS R&B

## 2017-03-05 RX ADMIN — PANTOPRAZOLE SODIUM 20 MG: 20 TABLET, DELAYED RELEASE ORAL at 06:20

## 2017-03-05 RX ADMIN — TRIHEXYPHENIDYL HYDROCHLORIDE 5 MG: 5 TABLET ORAL at 09:01

## 2017-03-05 RX ADMIN — TRIHEXYPHENIDYL HYDROCHLORIDE 5 MG: 5 TABLET ORAL at 13:36

## 2017-03-05 RX ADMIN — MONTELUKAST SODIUM 10 MG: 10 TABLET ORAL at 09:01

## 2017-03-05 RX ADMIN — GABAPENTIN 600 MG: 600 TABLET, FILM COATED ORAL at 09:01

## 2017-03-05 RX ADMIN — BECLOMETHASONE DIPROPIONATE 1 PUFF: 40 AEROSOL, METERED RESPIRATORY (INHALATION) at 20:51

## 2017-03-05 RX ADMIN — TRIHEXYPHENIDYL HYDROCHLORIDE 5 MG: 5 TABLET ORAL at 20:52

## 2017-03-05 RX ADMIN — METFORMIN HYDROCHLORIDE 500 MG: 500 TABLET, FILM COATED ORAL at 09:01

## 2017-03-05 RX ADMIN — GABAPENTIN 600 MG: 600 TABLET, FILM COATED ORAL at 20:52

## 2017-03-05 RX ADMIN — FENOFIBRATE 160 MG: 160 TABLET ORAL at 09:01

## 2017-03-05 RX ADMIN — SUCRALFATE 1 G: 1 TABLET ORAL at 13:36

## 2017-03-05 RX ADMIN — LISINOPRIL 20 MG: 20 TABLET ORAL at 09:01

## 2017-03-05 RX ADMIN — BECLOMETHASONE DIPROPIONATE 1 PUFF: 40 AEROSOL, METERED RESPIRATORY (INHALATION) at 09:02

## 2017-03-05 RX ADMIN — SUCRALFATE 1 G: 1 TABLET ORAL at 09:01

## 2017-03-05 RX ADMIN — LEVOTHYROXINE SODIUM 75 MCG: 75 TABLET ORAL at 09:01

## 2017-03-05 RX ADMIN — TRAZODONE HYDROCHLORIDE 50 MG: 50 TABLET ORAL at 20:52

## 2017-03-05 RX ADMIN — SUCRALFATE 1 G: 1 TABLET ORAL at 16:24

## 2017-03-05 RX ADMIN — SUCRALFATE 1 G: 1 TABLET ORAL at 20:52

## 2017-03-05 RX ADMIN — METFORMIN HYDROCHLORIDE 500 MG: 500 TABLET, FILM COATED ORAL at 16:24

## 2017-03-05 RX ADMIN — ESCITALOPRAM OXALATE 20 MG: 10 TABLET ORAL at 09:01

## 2017-03-05 ASSESSMENT — PAIN SCALES - GENERAL: PAINLEVEL_OUTOF10: 2

## 2017-03-06 LAB
GLUCOSE BLD-MCNC: 109 MG/DL (ref 70–99)
GLUCOSE BLD-MCNC: 113 MG/DL (ref 70–99)
GLUCOSE BLD-MCNC: 91 MG/DL (ref 70–99)
PERFORMED ON: ABNORMAL
PERFORMED ON: ABNORMAL
PERFORMED ON: NORMAL

## 2017-03-06 PROCEDURE — 6370000000 HC RX 637 (ALT 250 FOR IP): Performed by: PSYCHIATRY & NEUROLOGY

## 2017-03-06 PROCEDURE — 6370000000 HC RX 637 (ALT 250 FOR IP): Performed by: NURSE PRACTITIONER

## 2017-03-06 PROCEDURE — 1240000000 HC EMOTIONAL WELLNESS R&B

## 2017-03-06 PROCEDURE — 99232 SBSQ HOSP IP/OBS MODERATE 35: CPT | Performed by: PSYCHIATRY & NEUROLOGY

## 2017-03-06 PROCEDURE — 82948 REAGENT STRIP/BLOOD GLUCOSE: CPT

## 2017-03-06 RX ORDER — TRIHEXYPHENIDYL HYDROCHLORIDE 5 MG/1
5 TABLET ORAL 2 TIMES DAILY
Status: DISCONTINUED | OUTPATIENT
Start: 2017-03-06 | End: 2017-03-07 | Stop reason: HOSPADM

## 2017-03-06 RX ORDER — ESCITALOPRAM OXALATE 10 MG/1
10 TABLET ORAL DAILY
Status: DISCONTINUED | OUTPATIENT
Start: 2017-03-07 | End: 2017-03-07 | Stop reason: HOSPADM

## 2017-03-06 RX ORDER — ARIPIPRAZOLE 5 MG/1
15 TABLET ORAL DAILY
Status: DISCONTINUED | OUTPATIENT
Start: 2017-03-07 | End: 2017-03-07 | Stop reason: HOSPADM

## 2017-03-06 RX ADMIN — METFORMIN HYDROCHLORIDE 500 MG: 500 TABLET, FILM COATED ORAL at 09:03

## 2017-03-06 RX ADMIN — TRAZODONE HYDROCHLORIDE 50 MG: 50 TABLET ORAL at 20:59

## 2017-03-06 RX ADMIN — FENOFIBRATE 160 MG: 160 TABLET ORAL at 09:04

## 2017-03-06 RX ADMIN — SUCRALFATE 1 G: 1 TABLET ORAL at 09:10

## 2017-03-06 RX ADMIN — GABAPENTIN 600 MG: 600 TABLET, FILM COATED ORAL at 20:59

## 2017-03-06 RX ADMIN — PANTOPRAZOLE SODIUM 20 MG: 20 TABLET, DELAYED RELEASE ORAL at 06:12

## 2017-03-06 RX ADMIN — SUCRALFATE 1 G: 1 TABLET ORAL at 20:59

## 2017-03-06 RX ADMIN — TRIHEXYPHENIDYL HYDROCHLORIDE 5 MG: 5 TABLET ORAL at 09:03

## 2017-03-06 RX ADMIN — METFORMIN HYDROCHLORIDE 500 MG: 500 TABLET, FILM COATED ORAL at 17:31

## 2017-03-06 RX ADMIN — GABAPENTIN 600 MG: 600 TABLET, FILM COATED ORAL at 09:03

## 2017-03-06 RX ADMIN — LEVOTHYROXINE SODIUM 75 MCG: 75 TABLET ORAL at 09:04

## 2017-03-06 RX ADMIN — BECLOMETHASONE DIPROPIONATE 1 PUFF: 40 AEROSOL, METERED RESPIRATORY (INHALATION) at 20:58

## 2017-03-06 RX ADMIN — TRIHEXYPHENIDYL HYDROCHLORIDE 5 MG: 5 TABLET ORAL at 14:06

## 2017-03-06 RX ADMIN — BECLOMETHASONE DIPROPIONATE 1 PUFF: 40 AEROSOL, METERED RESPIRATORY (INHALATION) at 10:19

## 2017-03-06 RX ADMIN — MONTELUKAST SODIUM 10 MG: 10 TABLET ORAL at 09:03

## 2017-03-06 RX ADMIN — ESCITALOPRAM OXALATE 20 MG: 10 TABLET ORAL at 09:03

## 2017-03-06 RX ADMIN — SUCRALFATE 1 G: 1 TABLET ORAL at 17:31

## 2017-03-06 RX ADMIN — TRIHEXYPHENIDYL HYDROCHLORIDE 5 MG: 5 TABLET ORAL at 20:59

## 2017-03-06 RX ADMIN — LISINOPRIL 20 MG: 20 TABLET ORAL at 09:03

## 2017-03-06 RX ADMIN — NYSTATIN: 100000 CREAM TOPICAL at 10:21

## 2017-03-06 RX ADMIN — SUCRALFATE 1 G: 1 TABLET ORAL at 14:06

## 2017-03-06 ASSESSMENT — PAIN SCALES - GENERAL: PAINLEVEL_OUTOF10: 3

## 2017-03-07 VITALS
RESPIRATION RATE: 20 BRPM | OXYGEN SATURATION: 100 % | HEART RATE: 58 BPM | SYSTOLIC BLOOD PRESSURE: 120 MMHG | TEMPERATURE: 96.8 F | DIASTOLIC BLOOD PRESSURE: 58 MMHG | HEIGHT: 64 IN

## 2017-03-07 LAB
GLUCOSE BLD-MCNC: 117 MG/DL (ref 70–99)
PERFORMED ON: ABNORMAL
VITAMIN B-12: >2000 PG/ML (ref 211–946)
VITAMIN D 25-HYDROXY: 33.3 NG/ML

## 2017-03-07 PROCEDURE — 5130000000 HC BRIDGE APPOINTMENT

## 2017-03-07 PROCEDURE — 82306 VITAMIN D 25 HYDROXY: CPT

## 2017-03-07 PROCEDURE — 36415 COLL VENOUS BLD VENIPUNCTURE: CPT

## 2017-03-07 PROCEDURE — 82607 VITAMIN B-12: CPT

## 2017-03-07 PROCEDURE — 99239 HOSP IP/OBS DSCHRG MGMT >30: CPT | Performed by: PSYCHIATRY & NEUROLOGY

## 2017-03-07 PROCEDURE — 82948 REAGENT STRIP/BLOOD GLUCOSE: CPT

## 2017-03-07 PROCEDURE — 6370000000 HC RX 637 (ALT 250 FOR IP): Performed by: PSYCHIATRY & NEUROLOGY

## 2017-03-07 PROCEDURE — 6370000000 HC RX 637 (ALT 250 FOR IP): Performed by: NURSE PRACTITIONER

## 2017-03-07 RX ORDER — NYSTATIN 100000 U/G
CREAM TOPICAL
Qty: 15 G | Refills: 0 | Status: SHIPPED | OUTPATIENT
Start: 2017-03-07 | End: 2017-05-18

## 2017-03-07 RX ORDER — ARIPIPRAZOLE 15 MG/1
15 TABLET ORAL DAILY
Qty: 30 TABLET | Refills: 0 | Status: SHIPPED | OUTPATIENT
Start: 2017-03-07 | End: 2017-04-11

## 2017-03-07 RX ORDER — ESCITALOPRAM OXALATE 10 MG/1
10 TABLET ORAL DAILY
Qty: 30 TABLET | Refills: 1 | Status: SHIPPED | OUTPATIENT
Start: 2017-03-07 | End: 2017-04-11

## 2017-03-07 RX ORDER — TRIHEXYPHENIDYL HYDROCHLORIDE 5 MG/1
5 TABLET ORAL 2 TIMES DAILY
Qty: 60 TABLET | Refills: 0 | Status: SHIPPED | OUTPATIENT
Start: 2017-03-07 | End: 2017-04-11

## 2017-03-07 RX ADMIN — SUCRALFATE 1 G: 1 TABLET ORAL at 08:55

## 2017-03-07 RX ADMIN — METFORMIN HYDROCHLORIDE 500 MG: 500 TABLET, FILM COATED ORAL at 08:55

## 2017-03-07 RX ADMIN — GABAPENTIN 600 MG: 600 TABLET, FILM COATED ORAL at 08:55

## 2017-03-07 RX ADMIN — ARIPIPRAZOLE 15 MG: 5 TABLET ORAL at 08:54

## 2017-03-07 RX ADMIN — BECLOMETHASONE DIPROPIONATE 1 PUFF: 40 AEROSOL, METERED RESPIRATORY (INHALATION) at 08:56

## 2017-03-07 RX ADMIN — TRIHEXYPHENIDYL HYDROCHLORIDE 5 MG: 5 TABLET ORAL at 08:55

## 2017-03-07 RX ADMIN — LISINOPRIL 20 MG: 20 TABLET ORAL at 08:55

## 2017-03-07 RX ADMIN — FENOFIBRATE 160 MG: 160 TABLET ORAL at 08:55

## 2017-03-07 RX ADMIN — LEVOTHYROXINE SODIUM 75 MCG: 75 TABLET ORAL at 08:55

## 2017-03-07 RX ADMIN — MONTELUKAST SODIUM 10 MG: 10 TABLET ORAL at 08:54

## 2017-03-07 RX ADMIN — PANTOPRAZOLE SODIUM 20 MG: 20 TABLET, DELAYED RELEASE ORAL at 06:54

## 2017-03-07 RX ADMIN — ESCITALOPRAM OXALATE 10 MG: 10 TABLET ORAL at 08:55

## 2017-03-08 LAB
EKG P AXIS: 29 DEGREES
EKG P-R INTERVAL: 182 MS
EKG Q-T INTERVAL: 462 MS
EKG QRS DURATION: 84 MS
EKG QTC CALCULATION (BAZETT): 454 MS
EKG T AXIS: 26 DEGREES

## 2017-03-15 ENCOUNTER — HOSPITAL ENCOUNTER (OUTPATIENT)
Dept: PSYCHIATRY | Age: 62
Setting detail: THERAPIES SERIES
Discharge: HOME | End: 2017-03-15
Payer: MEDICARE

## 2017-03-15 VITALS
TEMPERATURE: 99 F | HEART RATE: 55 BPM | DIASTOLIC BLOOD PRESSURE: 79 MMHG | WEIGHT: 177 LBS | BODY MASS INDEX: 31.36 KG/M2 | HEIGHT: 63 IN | SYSTOLIC BLOOD PRESSURE: 137 MMHG | RESPIRATION RATE: 18 BRPM | OXYGEN SATURATION: 98 %

## 2017-03-15 DIAGNOSIS — F31.64 BIPOLAR AFFECTIVE DISORDER, MIXED, SEVERE, WITH PSYCHOTIC BEHAVIOR (HCC): Primary | ICD-10-CM

## 2017-03-15 PROCEDURE — 90792 PSYCH DIAG EVAL W/MED SRVCS: CPT | Performed by: NURSE PRACTITIONER

## 2017-03-15 RX ORDER — GABAPENTIN 600 MG/1
600 TABLET ORAL
COMMUNITY
End: 2017-03-31 | Stop reason: SDUPTHER

## 2017-03-15 RX ORDER — TRAZODONE HYDROCHLORIDE 50 MG/1
50 TABLET ORAL
COMMUNITY
End: 2017-03-31 | Stop reason: SDUPTHER

## 2017-03-15 RX ORDER — PROPRANOLOL HYDROCHLORIDE 10 MG/1
10 TABLET ORAL
COMMUNITY
End: 2017-03-31 | Stop reason: ALTCHOICE

## 2017-03-15 RX ORDER — PRAVASTATIN SODIUM 10 MG
10 TABLET ORAL
COMMUNITY
End: 2017-03-31 | Stop reason: ALTCHOICE

## 2017-03-15 RX ORDER — ESCITALOPRAM OXALATE 20 MG/1
20 TABLET ORAL
COMMUNITY
End: 2017-03-21 | Stop reason: DRUGHIGH

## 2017-03-15 RX ORDER — TRIAMCINOLONE ACETONIDE 55 UG/1
2 SPRAY, METERED NASAL
COMMUNITY
End: 2018-02-16

## 2017-03-15 RX ORDER — LEVOTHYROXINE SODIUM 0.07 MG/1
75 TABLET ORAL
COMMUNITY

## 2017-03-15 RX ORDER — LISINOPRIL 20 MG/1
20 TABLET ORAL
COMMUNITY
End: 2017-03-31 | Stop reason: SDUPTHER

## 2017-03-15 RX ORDER — OMEPRAZOLE 20 MG/1
CAPSULE, DELAYED RELEASE ORAL
COMMUNITY
End: 2017-03-31 | Stop reason: SDUPTHER

## 2017-03-15 RX ORDER — FENOFIBRATE 145 MG/1
145 TABLET, COATED ORAL
COMMUNITY
End: 2017-03-31 | Stop reason: ALTCHOICE

## 2017-03-15 RX ORDER — CYANOCOBALAMIN 1000 UG/ML
INJECTION INTRAMUSCULAR; SUBCUTANEOUS
COMMUNITY
End: 2017-03-31 | Stop reason: ALTCHOICE

## 2017-03-15 RX ORDER — LAMOTRIGINE 100 MG/1
100 TABLET ORAL
COMMUNITY
End: 2017-03-31 | Stop reason: ALTCHOICE

## 2017-03-15 RX ORDER — OXYBUTYNIN CHLORIDE 5 MG/1
5 TABLET ORAL
COMMUNITY
End: 2017-03-31 | Stop reason: ALTCHOICE

## 2017-03-15 RX ORDER — MONTELUKAST SODIUM 10 MG/1
10 TABLET ORAL
COMMUNITY
End: 2017-03-21 | Stop reason: SDUPTHER

## 2017-03-15 ASSESSMENT — PAIN DESCRIPTION - ORIENTATION: ORIENTATION: RIGHT

## 2017-03-15 ASSESSMENT — PAIN DESCRIPTION - PROGRESSION: CLINICAL_PROGRESSION: GRADUALLY WORSENING

## 2017-03-15 ASSESSMENT — SLEEP AND FATIGUE QUESTIONNAIRES
DO YOU HAVE DIFFICULTY SLEEPING: YES
DIFFICULTY FALLING ASLEEP: YES
RESTFUL SLEEP: NO
DIFFICULTY ARISING: NO
DIFFICULTY STAYING ASLEEP: YES
AVERAGE NUMBER OF SLEEP HOURS: 4
DO YOU USE A SLEEP AID: YES

## 2017-03-15 ASSESSMENT — PAIN SCALES - GENERAL: PAINLEVEL_OUTOF10: 5

## 2017-03-15 ASSESSMENT — PATIENT HEALTH QUESTIONNAIRE - PHQ9: SUM OF ALL RESPONSES TO PHQ QUESTIONS 1-9: 9

## 2017-03-15 ASSESSMENT — PAIN DESCRIPTION - PAIN TYPE: TYPE: CHRONIC PAIN

## 2017-03-15 ASSESSMENT — PAIN DESCRIPTION - DESCRIPTORS: DESCRIPTORS: SHARP;BURNING

## 2017-03-15 ASSESSMENT — ENCOUNTER SYMPTOMS: SHORTNESS OF BREATH: 1

## 2017-03-15 ASSESSMENT — LIFESTYLE VARIABLES: HISTORY_ALCOHOL_USE: NO

## 2017-03-15 ASSESSMENT — PAIN DESCRIPTION - LOCATION: LOCATION: HIP;BACK

## 2017-03-15 ASSESSMENT — PAIN DESCRIPTION - FREQUENCY: FREQUENCY: CONTINUOUS

## 2017-03-15 ASSESSMENT — PAIN DESCRIPTION - ONSET: ONSET: AWAKENED FROM SLEEP

## 2017-03-17 ENCOUNTER — HOSPITAL ENCOUNTER (OUTPATIENT)
Dept: PSYCHIATRY | Age: 62
Setting detail: THERAPIES SERIES
Discharge: HOME | End: 2017-03-17
Payer: MEDICARE

## 2017-03-17 PROCEDURE — 90853 GROUP PSYCHOTHERAPY: CPT

## 2017-03-17 PROCEDURE — G0177 OPPS/PHP; TRAIN & EDUC SERV: HCPCS

## 2017-03-17 ASSESSMENT — SLEEP AND FATIGUE QUESTIONNAIRES
DIFFICULTY ARISING: NO
DO YOU USE A SLEEP AID: YES
RESTFUL SLEEP: NO
DO YOU HAVE DIFFICULTY SLEEPING: YES
AVERAGE NUMBER OF SLEEP HOURS: 4
DIFFICULTY STAYING ASLEEP: YES
DIFFICULTY FALLING ASLEEP: YES

## 2017-03-17 ASSESSMENT — PATIENT HEALTH QUESTIONNAIRE - PHQ9: SUM OF ALL RESPONSES TO PHQ QUESTIONS 1-9: 9

## 2017-03-17 ASSESSMENT — LIFESTYLE VARIABLES: HISTORY_ALCOHOL_USE: NO

## 2017-03-21 ENCOUNTER — HOSPITAL ENCOUNTER (OUTPATIENT)
Dept: PSYCHIATRY | Age: 62
Setting detail: THERAPIES SERIES
Discharge: HOME | End: 2017-03-21
Payer: MEDICARE

## 2017-03-21 PROCEDURE — 90853 GROUP PSYCHOTHERAPY: CPT

## 2017-03-22 ENCOUNTER — HOSPITAL ENCOUNTER (OUTPATIENT)
Dept: PSYCHIATRY | Age: 62
Setting detail: THERAPIES SERIES
Discharge: HOME | End: 2017-03-22
Payer: MEDICARE

## 2017-03-22 PROCEDURE — 90853 GROUP PSYCHOTHERAPY: CPT | Performed by: SOCIAL WORKER

## 2017-03-22 PROCEDURE — G0177 OPPS/PHP; TRAIN & EDUC SERV: HCPCS | Performed by: SOCIAL WORKER

## 2017-03-23 ENCOUNTER — HOSPITAL ENCOUNTER (OUTPATIENT)
Dept: PSYCHIATRY | Age: 62
Setting detail: THERAPIES SERIES
Discharge: HOME | End: 2017-03-23
Payer: MEDICARE

## 2017-03-23 PROCEDURE — 90853 GROUP PSYCHOTHERAPY: CPT

## 2017-03-23 PROCEDURE — G0177 OPPS/PHP; TRAIN & EDUC SERV: HCPCS

## 2017-03-28 ENCOUNTER — HOSPITAL ENCOUNTER (OUTPATIENT)
Dept: PSYCHIATRY | Age: 62
Setting detail: THERAPIES SERIES
Discharge: HOME | End: 2017-03-28
Payer: MEDICARE

## 2017-03-28 PROCEDURE — 90853 GROUP PSYCHOTHERAPY: CPT

## 2017-03-28 PROCEDURE — G0177 OPPS/PHP; TRAIN & EDUC SERV: HCPCS

## 2017-03-29 ENCOUNTER — HOSPITAL ENCOUNTER (OUTPATIENT)
Dept: PSYCHIATRY | Age: 62
Setting detail: THERAPIES SERIES
Discharge: HOME | End: 2017-03-29
Payer: MEDICARE

## 2017-03-29 PROCEDURE — 90853 GROUP PSYCHOTHERAPY: CPT

## 2017-03-29 PROCEDURE — G0177 OPPS/PHP; TRAIN & EDUC SERV: HCPCS

## 2017-03-30 ENCOUNTER — HOSPITAL ENCOUNTER (OUTPATIENT)
Dept: PSYCHIATRY | Age: 62
Setting detail: THERAPIES SERIES
Discharge: HOME | End: 2017-03-30
Payer: MEDICARE

## 2017-03-30 PROCEDURE — G0177 OPPS/PHP; TRAIN & EDUC SERV: HCPCS

## 2017-03-30 PROCEDURE — 90853 GROUP PSYCHOTHERAPY: CPT

## 2017-03-31 RX ORDER — HYDROCODONE BITARTRATE AND ACETAMINOPHEN 5; 325 MG/1; MG/1
1 TABLET ORAL 2 TIMES DAILY PRN
COMMUNITY
End: 2017-04-11 | Stop reason: HOSPADM

## 2017-03-31 RX ORDER — PROMETHAZINE HYDROCHLORIDE 25 MG/1
25 TABLET ORAL DAILY PRN
COMMUNITY
End: 2017-03-31

## 2017-03-31 RX ORDER — SUMATRIPTAN 100 MG/1
100 TABLET, FILM COATED ORAL
COMMUNITY

## 2017-03-31 RX ORDER — M-VIT,TX,IRON,MINS/CALC/FOLIC 27MG-0.4MG
1 TABLET ORAL DAILY
COMMUNITY
End: 2019-06-27

## 2017-04-03 ENCOUNTER — HOSPITAL ENCOUNTER (OUTPATIENT)
Dept: PSYCHIATRY | Age: 62
Setting detail: THERAPIES SERIES
Discharge: HOME | End: 2017-04-03
Payer: MEDICARE

## 2017-04-03 PROCEDURE — G0177 OPPS/PHP; TRAIN & EDUC SERV: HCPCS

## 2017-04-03 PROCEDURE — 90853 GROUP PSYCHOTHERAPY: CPT

## 2017-04-04 ENCOUNTER — HOSPITAL ENCOUNTER (OUTPATIENT)
Dept: PSYCHIATRY | Age: 62
Setting detail: THERAPIES SERIES
Discharge: HOME | End: 2017-04-04
Payer: MEDICARE

## 2017-04-04 PROCEDURE — G0177 OPPS/PHP; TRAIN & EDUC SERV: HCPCS | Performed by: SOCIAL WORKER

## 2017-04-04 PROCEDURE — 90853 GROUP PSYCHOTHERAPY: CPT | Performed by: SOCIAL WORKER

## 2017-04-07 ENCOUNTER — TELEPHONE (OUTPATIENT)
Dept: PSYCHIATRY | Age: 62
End: 2017-04-07

## 2017-04-07 ENCOUNTER — HOSPITAL ENCOUNTER (OUTPATIENT)
Dept: PSYCHIATRY | Age: 62
Setting detail: THERAPIES SERIES
Discharge: HOME | End: 2017-04-07
Payer: MEDICARE

## 2017-04-07 PROCEDURE — 90853 GROUP PSYCHOTHERAPY: CPT

## 2017-04-07 PROCEDURE — G0177 OPPS/PHP; TRAIN & EDUC SERV: HCPCS

## 2017-04-10 ENCOUNTER — HOSPITAL ENCOUNTER (OUTPATIENT)
Dept: PSYCHIATRY | Age: 62
Setting detail: THERAPIES SERIES
Discharge: HOME | End: 2017-04-10
Payer: MEDICARE

## 2017-04-10 PROCEDURE — 90853 GROUP PSYCHOTHERAPY: CPT

## 2017-04-10 PROCEDURE — G0177 OPPS/PHP; TRAIN & EDUC SERV: HCPCS

## 2017-04-11 ENCOUNTER — HOSPITAL ENCOUNTER (OUTPATIENT)
Dept: PSYCHIATRY | Age: 62
Setting detail: THERAPIES SERIES
Discharge: HOME | End: 2017-04-11
Payer: MEDICARE

## 2017-04-11 PROCEDURE — G0177 OPPS/PHP; TRAIN & EDUC SERV: HCPCS

## 2017-04-11 PROCEDURE — 90832 PSYTX W PT 30 MINUTES: CPT | Performed by: NURSE PRACTITIONER

## 2017-04-11 PROCEDURE — 90853 GROUP PSYCHOTHERAPY: CPT

## 2017-04-11 RX ORDER — TRAZODONE HYDROCHLORIDE 50 MG/1
TABLET ORAL
Qty: 60 TABLET | Refills: 1 | Status: SHIPPED | OUTPATIENT
Start: 2017-04-11 | End: 2017-05-03

## 2017-04-11 RX ORDER — ARIPIPRAZOLE 20 MG/1
20 TABLET ORAL DAILY
Qty: 30 TABLET | Refills: 1 | Status: SHIPPED | OUTPATIENT
Start: 2017-04-11 | End: 2017-05-25

## 2017-04-11 RX ORDER — ESCITALOPRAM OXALATE 10 MG/1
10 TABLET ORAL DAILY
Qty: 30 TABLET | Refills: 1 | Status: SHIPPED | OUTPATIENT
Start: 2017-04-11 | End: 2017-04-18

## 2017-04-11 RX ORDER — TRIHEXYPHENIDYL HYDROCHLORIDE 5 MG/1
5 TABLET ORAL 2 TIMES DAILY
Qty: 60 TABLET | Refills: 1 | Status: SHIPPED | OUTPATIENT
Start: 2017-04-11 | End: 2017-04-27

## 2017-04-17 ENCOUNTER — HOSPITAL ENCOUNTER (OUTPATIENT)
Dept: PSYCHIATRY | Age: 62
Setting detail: THERAPIES SERIES
Discharge: HOME | End: 2017-04-17
Payer: MEDICARE

## 2017-04-17 PROCEDURE — G0177 OPPS/PHP; TRAIN & EDUC SERV: HCPCS

## 2017-04-17 PROCEDURE — 90853 GROUP PSYCHOTHERAPY: CPT

## 2017-04-18 ENCOUNTER — HOSPITAL ENCOUNTER (OUTPATIENT)
Dept: PSYCHIATRY | Age: 62
Setting detail: THERAPIES SERIES
Discharge: HOME | End: 2017-04-18
Payer: MEDICARE

## 2017-04-18 DIAGNOSIS — Z79.899 HIGH RISK MEDICATION USE: Primary | ICD-10-CM

## 2017-04-18 PROCEDURE — G0177 OPPS/PHP; TRAIN & EDUC SERV: HCPCS

## 2017-04-18 PROCEDURE — 90853 GROUP PSYCHOTHERAPY: CPT

## 2017-04-18 PROCEDURE — 90832 PSYTX W PT 30 MINUTES: CPT | Performed by: NURSE PRACTITIONER

## 2017-04-18 RX ORDER — ESCITALOPRAM OXALATE 10 MG/1
TABLET ORAL
Qty: 30 TABLET | Refills: 1
Start: 2017-04-18 | End: 2017-04-27

## 2017-04-18 RX ORDER — ARIPIPRAZOLE 5 MG/1
5 TABLET ORAL DAILY
Qty: 30 TABLET | Refills: 0 | Status: SHIPPED | OUTPATIENT
Start: 2017-04-18 | End: 2017-05-03 | Stop reason: HOSPADM

## 2017-04-20 ENCOUNTER — APPOINTMENT (OUTPATIENT)
Dept: PSYCHIATRY | Age: 62
End: 2017-04-20
Payer: MEDICARE

## 2017-04-21 ENCOUNTER — HOSPITAL ENCOUNTER (OUTPATIENT)
Dept: PSYCHIATRY | Age: 62
Setting detail: THERAPIES SERIES
Discharge: HOME | End: 2017-04-21
Payer: MEDICARE

## 2017-04-21 DIAGNOSIS — Z79.899 HIGH RISK MEDICATION USE: ICD-10-CM

## 2017-04-21 LAB
ALBUMIN SERPL-MCNC: 4.2 G/DL (ref 3.5–5.2)
ALP BLD-CCNC: 82 U/L (ref 35–104)
ALT SERPL-CCNC: 19 U/L (ref 5–33)
ANION GAP SERPL CALCULATED.3IONS-SCNC: 15 MMOL/L (ref 7–19)
AST SERPL-CCNC: 13 U/L (ref 5–32)
BILIRUB SERPL-MCNC: 0.3 MG/DL (ref 0.2–1.2)
BUN BLDV-MCNC: 26 MG/DL (ref 8–23)
CALCIUM SERPL-MCNC: 9.3 MG/DL (ref 8.8–10.2)
CHLORIDE BLD-SCNC: 102 MMOL/L (ref 98–111)
CHOLESTEROL, TOTAL: 201 MG/DL (ref 160–199)
CO2: 23 MMOL/L (ref 22–29)
CREAT SERPL-MCNC: 0.9 MG/DL (ref 0.5–0.9)
GFR NON-AFRICAN AMERICAN: >60
GLOBULIN: 2.3 G/DL
GLUCOSE BLD-MCNC: 75 MG/DL (ref 74–109)
HBA1C MFR BLD: 5.4 %
HDLC SERPL-MCNC: 63 MG/DL (ref 65–121)
LDL CHOLESTEROL CALCULATED: 118 MG/DL
POTASSIUM SERPL-SCNC: 4.9 MMOL/L (ref 3.5–5)
SODIUM BLD-SCNC: 140 MMOL/L (ref 136–145)
TOTAL PROTEIN: 6.5 G/DL (ref 6.6–8.7)
TRIGL SERPL-MCNC: 98 MG/DL (ref 150–199)

## 2017-04-21 PROCEDURE — G0177 OPPS/PHP; TRAIN & EDUC SERV: HCPCS

## 2017-04-21 PROCEDURE — 90853 GROUP PSYCHOTHERAPY: CPT

## 2017-04-26 ENCOUNTER — HOSPITAL ENCOUNTER (OUTPATIENT)
Dept: PSYCHIATRY | Age: 62
Setting detail: THERAPIES SERIES
Discharge: HOME | End: 2017-04-26
Payer: MEDICARE

## 2017-04-26 PROCEDURE — 90853 GROUP PSYCHOTHERAPY: CPT

## 2017-04-26 PROCEDURE — G0177 OPPS/PHP; TRAIN & EDUC SERV: HCPCS

## 2017-04-27 ENCOUNTER — HOSPITAL ENCOUNTER (OUTPATIENT)
Dept: PSYCHIATRY | Age: 62
Setting detail: THERAPIES SERIES
Discharge: HOME | End: 2017-04-27
Payer: MEDICARE

## 2017-04-27 PROCEDURE — 90853 GROUP PSYCHOTHERAPY: CPT

## 2017-04-27 PROCEDURE — G0177 OPPS/PHP; TRAIN & EDUC SERV: HCPCS

## 2017-04-27 PROCEDURE — 90832 PSYTX W PT 30 MINUTES: CPT | Performed by: NURSE PRACTITIONER

## 2017-04-27 RX ORDER — ESCITALOPRAM OXALATE 10 MG/1
TABLET ORAL
Qty: 30 TABLET | Refills: 1 | Status: SHIPPED | OUTPATIENT
Start: 2017-04-27 | End: 2017-05-03

## 2017-04-27 RX ORDER — OXCARBAZEPINE 300 MG/1
TABLET, FILM COATED ORAL
Qty: 60 TABLET | Refills: 0 | Status: SHIPPED | OUTPATIENT
Start: 2017-04-27 | End: 2017-05-25

## 2017-04-27 RX ORDER — GABAPENTIN 300 MG/1
CAPSULE ORAL
Qty: 30 CAPSULE | Refills: 0 | Status: SHIPPED | OUTPATIENT
Start: 2017-04-27 | End: 2017-05-25

## 2017-04-27 RX ORDER — TRIHEXYPHENIDYL HYDROCHLORIDE 5 MG/1
5 TABLET ORAL 3 TIMES DAILY
Qty: 90 TABLET | Refills: 1 | Status: SHIPPED | OUTPATIENT
Start: 2017-04-27 | End: 2017-05-25

## 2017-04-27 RX ORDER — OXCARBAZEPINE 300 MG/1
TABLET, FILM COATED ORAL
Qty: 60 TABLET | Refills: 0 | Status: SHIPPED | OUTPATIENT
Start: 2017-04-27 | End: 2017-04-27

## 2017-04-28 ENCOUNTER — HOSPITAL ENCOUNTER (OUTPATIENT)
Dept: PSYCHIATRY | Age: 62
Setting detail: THERAPIES SERIES
Discharge: HOME | End: 2017-04-28
Payer: MEDICARE

## 2017-04-28 PROCEDURE — 90853 GROUP PSYCHOTHERAPY: CPT

## 2017-04-28 PROCEDURE — G0177 OPPS/PHP; TRAIN & EDUC SERV: HCPCS

## 2017-05-01 ENCOUNTER — HOSPITAL ENCOUNTER (OUTPATIENT)
Dept: PSYCHIATRY | Age: 62
Setting detail: THERAPIES SERIES
Discharge: HOME | End: 2017-05-01
Payer: MEDICARE

## 2017-05-01 PROCEDURE — G0177 OPPS/PHP; TRAIN & EDUC SERV: HCPCS

## 2017-05-01 PROCEDURE — 90853 GROUP PSYCHOTHERAPY: CPT

## 2017-05-03 ENCOUNTER — HOSPITAL ENCOUNTER (OUTPATIENT)
Dept: PSYCHIATRY | Age: 62
Setting detail: THERAPIES SERIES
Discharge: HOME | End: 2017-05-03
Payer: MEDICARE

## 2017-05-03 PROCEDURE — G0177 OPPS/PHP; TRAIN & EDUC SERV: HCPCS | Performed by: SOCIAL WORKER

## 2017-05-03 PROCEDURE — 90853 GROUP PSYCHOTHERAPY: CPT | Performed by: SOCIAL WORKER

## 2017-05-03 RX ORDER — TRAZODONE HYDROCHLORIDE 50 MG/1
TABLET ORAL
Qty: 60 TABLET | Refills: 1 | Status: SHIPPED | OUTPATIENT
Start: 2017-05-03 | End: 2017-05-25

## 2017-05-03 RX ORDER — ESCITALOPRAM OXALATE 10 MG/1
TABLET ORAL
Qty: 15 TABLET | Refills: 0 | Status: SHIPPED | OUTPATIENT
Start: 2017-05-03 | End: 2017-05-25

## 2017-05-05 ENCOUNTER — HOSPITAL ENCOUNTER (OUTPATIENT)
Dept: PSYCHIATRY | Age: 62
Setting detail: THERAPIES SERIES
Discharge: HOME | End: 2017-05-05
Payer: MEDICARE

## 2017-05-05 PROCEDURE — 90853 GROUP PSYCHOTHERAPY: CPT

## 2017-05-05 PROCEDURE — G0177 OPPS/PHP; TRAIN & EDUC SERV: HCPCS

## 2017-05-10 ENCOUNTER — HOSPITAL ENCOUNTER (OUTPATIENT)
Dept: PSYCHIATRY | Age: 62
Setting detail: THERAPIES SERIES
Discharge: HOME | End: 2017-05-10
Payer: MEDICARE

## 2017-05-10 PROCEDURE — G0177 OPPS/PHP; TRAIN & EDUC SERV: HCPCS

## 2017-05-10 PROCEDURE — 90853 GROUP PSYCHOTHERAPY: CPT

## 2017-05-12 ENCOUNTER — HOSPITAL ENCOUNTER (OUTPATIENT)
Dept: PSYCHIATRY | Age: 62
Setting detail: THERAPIES SERIES
Discharge: HOME | End: 2017-05-12
Payer: MEDICARE

## 2017-05-12 PROCEDURE — 90853 GROUP PSYCHOTHERAPY: CPT | Performed by: SOCIAL WORKER

## 2017-05-12 PROCEDURE — G0177 OPPS/PHP; TRAIN & EDUC SERV: HCPCS | Performed by: SOCIAL WORKER

## 2017-05-15 ENCOUNTER — HOSPITAL ENCOUNTER (OUTPATIENT)
Dept: PSYCHIATRY | Age: 62
Setting detail: THERAPIES SERIES
Discharge: HOME | End: 2017-05-15
Payer: MEDICARE

## 2017-05-15 PROCEDURE — 90853 GROUP PSYCHOTHERAPY: CPT | Performed by: SOCIAL WORKER

## 2017-05-15 PROCEDURE — G0177 OPPS/PHP; TRAIN & EDUC SERV: HCPCS | Performed by: SOCIAL WORKER

## 2017-05-17 ENCOUNTER — HOSPITAL ENCOUNTER (OUTPATIENT)
Dept: PSYCHIATRY | Age: 62
Setting detail: THERAPIES SERIES
Discharge: HOME | End: 2017-05-17
Payer: MEDICARE

## 2017-05-17 PROCEDURE — G0177 OPPS/PHP; TRAIN & EDUC SERV: HCPCS | Performed by: SOCIAL WORKER

## 2017-05-17 PROCEDURE — 90853 GROUP PSYCHOTHERAPY: CPT | Performed by: SOCIAL WORKER

## 2017-05-18 ENCOUNTER — OFFICE VISIT (OUTPATIENT)
Dept: GASTROENTEROLOGY | Age: 62
End: 2017-05-18
Payer: MEDICARE

## 2017-05-18 VITALS
SYSTOLIC BLOOD PRESSURE: 124 MMHG | HEART RATE: 60 BPM | WEIGHT: 192 LBS | OXYGEN SATURATION: 92 % | DIASTOLIC BLOOD PRESSURE: 82 MMHG | HEIGHT: 63 IN | BODY MASS INDEX: 34.02 KG/M2

## 2017-05-18 DIAGNOSIS — Z87.19 HISTORY OF ESOPHAGEAL STRICTURE: ICD-10-CM

## 2017-05-18 DIAGNOSIS — K21.9 CHRONIC GERD: ICD-10-CM

## 2017-05-18 DIAGNOSIS — R13.19 ESOPHAGEAL DYSPHAGIA: Primary | ICD-10-CM

## 2017-05-18 DIAGNOSIS — Z79.1 NSAID LONG-TERM USE: ICD-10-CM

## 2017-05-18 PROCEDURE — 3014F SCREEN MAMMO DOC REV: CPT | Performed by: NURSE PRACTITIONER

## 2017-05-18 PROCEDURE — G8427 DOCREV CUR MEDS BY ELIG CLIN: HCPCS | Performed by: NURSE PRACTITIONER

## 2017-05-18 PROCEDURE — G8417 CALC BMI ABV UP PARAM F/U: HCPCS | Performed by: NURSE PRACTITIONER

## 2017-05-18 PROCEDURE — 1036F TOBACCO NON-USER: CPT | Performed by: NURSE PRACTITIONER

## 2017-05-18 PROCEDURE — 3017F COLORECTAL CA SCREEN DOC REV: CPT | Performed by: NURSE PRACTITIONER

## 2017-05-18 PROCEDURE — 99214 OFFICE O/P EST MOD 30 MIN: CPT | Performed by: NURSE PRACTITIONER

## 2017-05-18 RX ORDER — OMEPRAZOLE 40 MG/1
40 CAPSULE, DELAYED RELEASE ORAL DAILY
Qty: 30 CAPSULE | Refills: 3 | Status: SHIPPED | OUTPATIENT
Start: 2017-05-18 | End: 2017-09-11 | Stop reason: SDUPTHER

## 2017-05-18 RX ORDER — GABAPENTIN 600 MG/1
800 TABLET ORAL 3 TIMES DAILY
COMMUNITY
End: 2021-01-14 | Stop reason: DRUGHIGH

## 2017-05-18 RX ORDER — OMEPRAZOLE 20 MG/1
20 CAPSULE, DELAYED RELEASE ORAL DAILY
COMMUNITY
End: 2017-05-18 | Stop reason: DRUGHIGH

## 2017-05-18 ASSESSMENT — ENCOUNTER SYMPTOMS
SORE THROAT: 0
CHEST TIGHTNESS: 0
ABDOMINAL PAIN: 0
TROUBLE SWALLOWING: 1
COUGH: 0
RECTAL PAIN: 0
CONSTIPATION: 0
VOICE CHANGE: 0
BLOOD IN STOOL: 0
VOMITING: 0
SHORTNESS OF BREATH: 0
DIARRHEA: 0
BACK PAIN: 1
NAUSEA: 0
ABDOMINAL DISTENTION: 0

## 2017-05-19 ENCOUNTER — HOSPITAL ENCOUNTER (OUTPATIENT)
Dept: PSYCHIATRY | Age: 62
Setting detail: THERAPIES SERIES
Discharge: HOME | End: 2017-05-19
Payer: MEDICARE

## 2017-05-19 PROCEDURE — 90853 GROUP PSYCHOTHERAPY: CPT | Performed by: SOCIAL WORKER

## 2017-05-19 PROCEDURE — G0177 OPPS/PHP; TRAIN & EDUC SERV: HCPCS | Performed by: SOCIAL WORKER

## 2017-05-22 ENCOUNTER — HOSPITAL ENCOUNTER (OUTPATIENT)
Dept: PSYCHIATRY | Age: 62
Setting detail: THERAPIES SERIES
Discharge: HOME | End: 2017-05-22
Payer: MEDICARE

## 2017-05-22 PROCEDURE — 90853 GROUP PSYCHOTHERAPY: CPT | Performed by: SOCIAL WORKER

## 2017-05-22 PROCEDURE — G0177 OPPS/PHP; TRAIN & EDUC SERV: HCPCS | Performed by: SOCIAL WORKER

## 2017-05-24 ENCOUNTER — HOSPITAL ENCOUNTER (OUTPATIENT)
Dept: PSYCHIATRY | Age: 62
Setting detail: THERAPIES SERIES
Discharge: HOME | End: 2017-05-24
Payer: MEDICARE

## 2017-05-24 PROCEDURE — G0177 OPPS/PHP; TRAIN & EDUC SERV: HCPCS | Performed by: SOCIAL WORKER

## 2017-05-24 PROCEDURE — 90853 GROUP PSYCHOTHERAPY: CPT | Performed by: SOCIAL WORKER

## 2017-05-25 ENCOUNTER — HOSPITAL ENCOUNTER (OUTPATIENT)
Dept: PSYCHIATRY | Age: 62
Setting detail: THERAPIES SERIES
Discharge: HOME | End: 2017-05-25
Payer: MEDICARE

## 2017-05-25 VITALS
SYSTOLIC BLOOD PRESSURE: 127 MMHG | DIASTOLIC BLOOD PRESSURE: 80 MMHG | HEART RATE: 87 BPM | TEMPERATURE: 98.6 F | RESPIRATION RATE: 18 BRPM

## 2017-05-25 PROCEDURE — 90853 GROUP PSYCHOTHERAPY: CPT | Performed by: SOCIAL WORKER

## 2017-05-25 PROCEDURE — 90832 PSYTX W PT 30 MINUTES: CPT | Performed by: NURSE PRACTITIONER

## 2017-05-25 PROCEDURE — G0177 OPPS/PHP; TRAIN & EDUC SERV: HCPCS | Performed by: SOCIAL WORKER

## 2017-05-25 RX ORDER — ESCITALOPRAM OXALATE 10 MG/1
TABLET ORAL
Qty: 15 TABLET | Refills: 0 | Status: SHIPPED | OUTPATIENT
Start: 2017-05-25 | End: 2017-06-13 | Stop reason: SDUPTHER

## 2017-05-25 RX ORDER — TRIHEXYPHENIDYL HYDROCHLORIDE 5 MG/1
5 TABLET ORAL 3 TIMES DAILY
Qty: 90 TABLET | Refills: 0 | Status: SHIPPED | OUTPATIENT
Start: 2017-05-25 | End: 2017-06-13 | Stop reason: SDUPTHER

## 2017-05-25 RX ORDER — GABAPENTIN 300 MG/1
CAPSULE ORAL
Qty: 30 CAPSULE | Refills: 0 | Status: SHIPPED | OUTPATIENT
Start: 2017-05-25 | End: 2017-06-13 | Stop reason: SDUPTHER

## 2017-05-25 RX ORDER — ARIPIPRAZOLE 20 MG/1
20 TABLET ORAL DAILY
Qty: 30 TABLET | Refills: 0 | Status: SHIPPED | OUTPATIENT
Start: 2017-05-25 | End: 2017-06-13 | Stop reason: SDUPTHER

## 2017-05-25 RX ORDER — OXCARBAZEPINE 300 MG/1
TABLET, FILM COATED ORAL
Qty: 60 TABLET | Refills: 0 | Status: SHIPPED | OUTPATIENT
Start: 2017-05-25 | End: 2017-06-13 | Stop reason: SDUPTHER

## 2017-05-25 RX ORDER — TRAZODONE HYDROCHLORIDE 50 MG/1
TABLET ORAL
Qty: 60 TABLET | Refills: 0 | Status: SHIPPED | OUTPATIENT
Start: 2017-05-25 | End: 2017-06-13 | Stop reason: SDUPTHER

## 2017-06-01 ENCOUNTER — TELEPHONE (OUTPATIENT)
Dept: PSYCHIATRY | Age: 62
End: 2017-06-01

## 2017-06-13 ENCOUNTER — OFFICE VISIT (OUTPATIENT)
Dept: PSYCHIATRY | Age: 62
End: 2017-06-13
Payer: MEDICARE

## 2017-06-13 VITALS
OXYGEN SATURATION: 99 % | WEIGHT: 189.4 LBS | SYSTOLIC BLOOD PRESSURE: 113 MMHG | HEART RATE: 65 BPM | DIASTOLIC BLOOD PRESSURE: 70 MMHG | BODY MASS INDEX: 33.56 KG/M2 | HEIGHT: 63 IN

## 2017-06-13 DIAGNOSIS — F31.9 BIPOLAR I DISORDER (HCC): Primary | ICD-10-CM

## 2017-06-13 PROCEDURE — G8427 DOCREV CUR MEDS BY ELIG CLIN: HCPCS | Performed by: NURSE PRACTITIONER

## 2017-06-13 PROCEDURE — 1036F TOBACCO NON-USER: CPT | Performed by: NURSE PRACTITIONER

## 2017-06-13 PROCEDURE — G8417 CALC BMI ABV UP PARAM F/U: HCPCS | Performed by: NURSE PRACTITIONER

## 2017-06-13 PROCEDURE — 3014F SCREEN MAMMO DOC REV: CPT | Performed by: NURSE PRACTITIONER

## 2017-06-13 PROCEDURE — 3017F COLORECTAL CA SCREEN DOC REV: CPT | Performed by: NURSE PRACTITIONER

## 2017-06-13 PROCEDURE — 99214 OFFICE O/P EST MOD 30 MIN: CPT | Performed by: NURSE PRACTITIONER

## 2017-06-13 RX ORDER — GABAPENTIN 300 MG/1
CAPSULE ORAL
Qty: 30 CAPSULE | Refills: 0 | Status: SHIPPED | OUTPATIENT
Start: 2017-06-13 | End: 2017-07-25 | Stop reason: SDUPTHER

## 2017-06-13 RX ORDER — HYDROCODONE BITARTRATE AND ACETAMINOPHEN 5; 325 MG/1; MG/1
TABLET ORAL
Refills: 0 | COMMUNITY
Start: 2017-05-22 | End: 2018-02-16

## 2017-06-13 RX ORDER — OXCARBAZEPINE 300 MG/1
TABLET, FILM COATED ORAL
Qty: 60 TABLET | Refills: 0 | Status: SHIPPED | OUTPATIENT
Start: 2017-06-13 | End: 2017-07-25 | Stop reason: SDUPTHER

## 2017-06-13 RX ORDER — ESCITALOPRAM OXALATE 10 MG/1
TABLET ORAL
Qty: 30 TABLET | Refills: 0 | Status: SHIPPED | OUTPATIENT
Start: 2017-06-13 | End: 2017-07-27 | Stop reason: SDUPTHER

## 2017-06-13 RX ORDER — ARIPIPRAZOLE 20 MG/1
20 TABLET ORAL DAILY
Qty: 30 TABLET | Refills: 0 | Status: SHIPPED | OUTPATIENT
Start: 2017-06-13 | End: 2017-07-27 | Stop reason: SDUPTHER

## 2017-06-13 RX ORDER — TRIHEXYPHENIDYL HYDROCHLORIDE 5 MG/1
5 TABLET ORAL 3 TIMES DAILY
Qty: 90 TABLET | Refills: 0 | Status: SHIPPED | OUTPATIENT
Start: 2017-06-13 | End: 2017-07-27 | Stop reason: SDUPTHER

## 2017-06-13 RX ORDER — PROMETHAZINE HYDROCHLORIDE 25 MG/1
TABLET ORAL
Refills: 2 | COMMUNITY
Start: 2017-05-18 | End: 2018-02-16

## 2017-06-13 RX ORDER — TRAZODONE HYDROCHLORIDE 50 MG/1
TABLET ORAL
Qty: 60 TABLET | Refills: 0 | Status: SHIPPED | OUTPATIENT
Start: 2017-06-13 | End: 2017-07-27 | Stop reason: SDUPTHER

## 2017-07-25 DIAGNOSIS — F31.9 BIPOLAR I DISORDER (HCC): ICD-10-CM

## 2017-07-28 RX ORDER — ARIPIPRAZOLE 20 MG/1
20 TABLET ORAL DAILY
Qty: 30 TABLET | Refills: 0 | Status: SHIPPED | OUTPATIENT
Start: 2017-07-28 | End: 2017-08-02 | Stop reason: SDUPTHER

## 2017-07-28 RX ORDER — TRIHEXYPHENIDYL HYDROCHLORIDE 5 MG/1
5 TABLET ORAL 3 TIMES DAILY
Qty: 90 TABLET | Refills: 0 | Status: SHIPPED | OUTPATIENT
Start: 2017-07-28 | End: 2017-08-02 | Stop reason: SDUPTHER

## 2017-07-28 RX ORDER — GABAPENTIN 300 MG/1
CAPSULE ORAL
Qty: 30 CAPSULE | Refills: 0 | Status: SHIPPED | OUTPATIENT
Start: 2017-07-28 | End: 2017-08-02 | Stop reason: SDUPTHER

## 2017-07-28 RX ORDER — OXCARBAZEPINE 300 MG/1
TABLET, FILM COATED ORAL
Qty: 60 TABLET | Refills: 0 | Status: SHIPPED | OUTPATIENT
Start: 2017-07-28 | End: 2017-08-02 | Stop reason: SDUPTHER

## 2017-07-28 RX ORDER — TRAZODONE HYDROCHLORIDE 50 MG/1
TABLET ORAL
Qty: 60 TABLET | Refills: 0 | Status: SHIPPED | OUTPATIENT
Start: 2017-07-28 | End: 2017-08-02 | Stop reason: SDUPTHER

## 2017-07-28 RX ORDER — ESCITALOPRAM OXALATE 10 MG/1
TABLET ORAL
Qty: 30 TABLET | Refills: 0 | Status: SHIPPED | OUTPATIENT
Start: 2017-07-28 | End: 2017-08-02 | Stop reason: SDUPTHER

## 2017-07-31 ENCOUNTER — TELEPHONE (OUTPATIENT)
Dept: GASTROENTEROLOGY | Age: 62
End: 2017-07-31

## 2017-07-31 ENCOUNTER — ANESTHESIA (OUTPATIENT)
Dept: ENDOSCOPY | Age: 62
End: 2017-07-31
Payer: MEDICARE

## 2017-07-31 ENCOUNTER — ANESTHESIA EVENT (OUTPATIENT)
Dept: ENDOSCOPY | Age: 62
End: 2017-07-31
Payer: MEDICARE

## 2017-07-31 ENCOUNTER — HOSPITAL ENCOUNTER (OUTPATIENT)
Age: 62
Setting detail: OUTPATIENT SURGERY
Discharge: HOME | End: 2017-07-31
Attending: INTERNAL MEDICINE | Admitting: INTERNAL MEDICINE
Payer: MEDICARE

## 2017-07-31 VITALS
HEART RATE: 57 BPM | WEIGHT: 185 LBS | TEMPERATURE: 97.9 F | OXYGEN SATURATION: 99 % | HEIGHT: 63 IN | RESPIRATION RATE: 16 BRPM | SYSTOLIC BLOOD PRESSURE: 150 MMHG | DIASTOLIC BLOOD PRESSURE: 86 MMHG | BODY MASS INDEX: 32.78 KG/M2

## 2017-07-31 VITALS
RESPIRATION RATE: 17 BRPM | OXYGEN SATURATION: 97 % | SYSTOLIC BLOOD PRESSURE: 120 MMHG | DIASTOLIC BLOOD PRESSURE: 68 MMHG

## 2017-07-31 LAB
GLUCOSE BLD-MCNC: 88 MG/DL (ref 70–99)
PERFORMED ON: NORMAL

## 2017-07-31 PROCEDURE — 7100000010 HC PHASE II RECOVERY - FIRST 15 MIN: Performed by: INTERNAL MEDICINE

## 2017-07-31 PROCEDURE — 45378 DIAGNOSTIC COLONOSCOPY: CPT | Performed by: INTERNAL MEDICINE

## 2017-07-31 PROCEDURE — 3609012400 HC EGD TRANSORAL BIOPSY SINGLE/MULTIPLE: Performed by: INTERNAL MEDICINE

## 2017-07-31 PROCEDURE — 3700000000 HC ANESTHESIA ATTENDED CARE: Performed by: INTERNAL MEDICINE

## 2017-07-31 PROCEDURE — 88305 TISSUE EXAM BY PATHOLOGIST: CPT

## 2017-07-31 PROCEDURE — 2500000003 HC RX 250 WO HCPCS: Performed by: INTERNAL MEDICINE

## 2017-07-31 PROCEDURE — 82948 REAGENT STRIP/BLOOD GLUCOSE: CPT

## 2017-07-31 PROCEDURE — 6360000002 HC RX W HCPCS: Performed by: NURSE ANESTHETIST, CERTIFIED REGISTERED

## 2017-07-31 PROCEDURE — 7100000011 HC PHASE II RECOVERY - ADDTL 15 MIN: Performed by: INTERNAL MEDICINE

## 2017-07-31 PROCEDURE — 3700000001 HC ADD 15 MINUTES (ANESTHESIA): Performed by: INTERNAL MEDICINE

## 2017-07-31 PROCEDURE — 43239 EGD BIOPSY SINGLE/MULTIPLE: CPT | Performed by: INTERNAL MEDICINE

## 2017-07-31 PROCEDURE — 3609009500 HC COLONOSCOPY DIAGNOSTIC OR SCREENING: Performed by: INTERNAL MEDICINE

## 2017-07-31 RX ORDER — PROPOFOL 10 MG/ML
INJECTION, EMULSION INTRAVENOUS PRN
Status: DISCONTINUED | OUTPATIENT
Start: 2017-07-31 | End: 2017-07-31 | Stop reason: SDUPTHER

## 2017-07-31 RX ORDER — LIDOCAINE HYDROCHLORIDE 10 MG/ML
1 INJECTION, SOLUTION INFILTRATION; PERINEURAL ONCE
Status: COMPLETED | OUTPATIENT
Start: 2017-07-31 | End: 2017-07-31

## 2017-07-31 RX ORDER — MIDAZOLAM HYDROCHLORIDE 1 MG/ML
INJECTION INTRAMUSCULAR; INTRAVENOUS PRN
Status: DISCONTINUED | OUTPATIENT
Start: 2017-07-31 | End: 2017-07-31 | Stop reason: SDUPTHER

## 2017-07-31 RX ORDER — SODIUM CHLORIDE, SODIUM LACTATE, POTASSIUM CHLORIDE, CALCIUM CHLORIDE 600; 310; 30; 20 MG/100ML; MG/100ML; MG/100ML; MG/100ML
INJECTION, SOLUTION INTRAVENOUS CONTINUOUS
Status: DISCONTINUED | OUTPATIENT
Start: 2017-07-31 | End: 2017-07-31 | Stop reason: HOSPADM

## 2017-07-31 RX ADMIN — MIDAZOLAM HYDROCHLORIDE 2 MG: 1 INJECTION, SOLUTION INTRAMUSCULAR; INTRAVENOUS at 10:23

## 2017-07-31 RX ADMIN — PROPOFOL 420 MG: 10 INJECTION, EMULSION INTRAVENOUS at 10:23

## 2017-07-31 RX ADMIN — LIDOCAINE HYDROCHLORIDE 5 ML: 10 INJECTION, SOLUTION INFILTRATION; PERINEURAL at 10:23

## 2017-08-01 ENCOUNTER — HOSPITAL ENCOUNTER (OUTPATIENT)
Age: 62
Setting detail: SPECIMEN
Discharge: HOME | End: 2017-08-01
Payer: MEDICARE

## 2017-08-01 ENCOUNTER — HOSPITAL ENCOUNTER (OUTPATIENT)
Age: 62
Setting detail: OUTPATIENT SURGERY
Discharge: HOME | End: 2017-08-01
Attending: INTERNAL MEDICINE | Admitting: INTERNAL MEDICINE
Payer: MEDICARE

## 2017-08-01 ENCOUNTER — ANESTHESIA EVENT (OUTPATIENT)
Dept: OPERATING ROOM | Age: 62
End: 2017-08-01

## 2017-08-01 ENCOUNTER — ANESTHESIA (OUTPATIENT)
Dept: OPERATING ROOM | Age: 62
End: 2017-08-01
Payer: MEDICARE

## 2017-08-01 VITALS
RESPIRATION RATE: 16 BRPM | BODY MASS INDEX: 32.78 KG/M2 | HEART RATE: 78 BPM | HEIGHT: 63 IN | OXYGEN SATURATION: 99 % | DIASTOLIC BLOOD PRESSURE: 60 MMHG | WEIGHT: 185 LBS | SYSTOLIC BLOOD PRESSURE: 97 MMHG | TEMPERATURE: 98.5 F

## 2017-08-01 VITALS — SYSTOLIC BLOOD PRESSURE: 130 MMHG | OXYGEN SATURATION: 99 % | DIASTOLIC BLOOD PRESSURE: 84 MMHG

## 2017-08-01 PROCEDURE — 00810 PR ANESTH,INTESTINE,SCOPE,LOW: CPT | Performed by: NURSE ANESTHETIST, CERTIFIED REGISTERED

## 2017-08-01 PROCEDURE — 45380 COLONOSCOPY AND BIOPSY: CPT | Performed by: INTERNAL MEDICINE

## 2017-08-01 PROCEDURE — 88305 TISSUE EXAM BY PATHOLOGIST: CPT

## 2017-08-01 PROCEDURE — G8918 PT W/O PREOP ORDER IV AB PRO: HCPCS

## 2017-08-01 PROCEDURE — G8907 PT DOC NO EVENTS ON DISCHARG: HCPCS

## 2017-08-01 PROCEDURE — 45380 COLONOSCOPY AND BIOPSY: CPT

## 2017-08-01 RX ORDER — PROPOFOL 10 MG/ML
INJECTION, EMULSION INTRAVENOUS PRN
Status: DISCONTINUED | OUTPATIENT
Start: 2017-08-01 | End: 2017-08-01 | Stop reason: SDUPTHER

## 2017-08-01 RX ORDER — SODIUM CHLORIDE 9 MG/ML
INJECTION, SOLUTION INTRAVENOUS CONTINUOUS
Status: DISCONTINUED | OUTPATIENT
Start: 2017-08-01 | End: 2017-08-01 | Stop reason: HOSPADM

## 2017-08-01 RX ADMIN — SODIUM CHLORIDE: 9 INJECTION, SOLUTION INTRAVENOUS at 08:50

## 2017-08-01 RX ADMIN — PROPOFOL 100 MG: 10 INJECTION, EMULSION INTRAVENOUS at 09:10

## 2017-08-01 RX ADMIN — PROPOFOL 200 MG: 10 INJECTION, EMULSION INTRAVENOUS at 08:52

## 2017-08-01 RX ADMIN — SODIUM CHLORIDE: 9 INJECTION, SOLUTION INTRAVENOUS at 08:08

## 2017-08-01 ASSESSMENT — PAIN SCALES - GENERAL
PAINLEVEL_OUTOF10: 0
PAINLEVEL_OUTOF10: 0

## 2017-08-02 ENCOUNTER — OFFICE VISIT (OUTPATIENT)
Dept: PSYCHIATRY | Age: 62
End: 2017-08-02
Payer: MEDICARE

## 2017-08-02 VITALS
OXYGEN SATURATION: 96 % | BODY MASS INDEX: 34.64 KG/M2 | HEIGHT: 63 IN | SYSTOLIC BLOOD PRESSURE: 139 MMHG | HEART RATE: 72 BPM | WEIGHT: 195.5 LBS | DIASTOLIC BLOOD PRESSURE: 75 MMHG

## 2017-08-02 DIAGNOSIS — F31.9 BIPOLAR I DISORDER (HCC): ICD-10-CM

## 2017-08-02 PROCEDURE — G8417 CALC BMI ABV UP PARAM F/U: HCPCS | Performed by: NURSE PRACTITIONER

## 2017-08-02 PROCEDURE — G8427 DOCREV CUR MEDS BY ELIG CLIN: HCPCS | Performed by: NURSE PRACTITIONER

## 2017-08-02 PROCEDURE — 99213 OFFICE O/P EST LOW 20 MIN: CPT | Performed by: NURSE PRACTITIONER

## 2017-08-02 PROCEDURE — 1036F TOBACCO NON-USER: CPT | Performed by: NURSE PRACTITIONER

## 2017-08-02 PROCEDURE — 3017F COLORECTAL CA SCREEN DOC REV: CPT | Performed by: NURSE PRACTITIONER

## 2017-08-02 PROCEDURE — 3014F SCREEN MAMMO DOC REV: CPT | Performed by: NURSE PRACTITIONER

## 2017-08-02 RX ORDER — TRAZODONE HYDROCHLORIDE 50 MG/1
TABLET ORAL
Qty: 60 TABLET | Refills: 3 | Status: SHIPPED | OUTPATIENT
Start: 2017-08-02 | End: 2017-10-13 | Stop reason: SDUPTHER

## 2017-08-02 RX ORDER — ESCITALOPRAM OXALATE 10 MG/1
TABLET ORAL
Qty: 30 TABLET | Refills: 3 | Status: SHIPPED | OUTPATIENT
Start: 2017-08-02 | End: 2017-10-13 | Stop reason: SDUPTHER

## 2017-08-02 RX ORDER — ARIPIPRAZOLE 20 MG/1
20 TABLET ORAL DAILY
Qty: 30 TABLET | Refills: 3 | Status: SHIPPED | OUTPATIENT
Start: 2017-08-02 | End: 2017-10-13 | Stop reason: SDUPTHER

## 2017-08-02 RX ORDER — OXCARBAZEPINE 300 MG/1
TABLET, FILM COATED ORAL
Qty: 60 TABLET | Refills: 3 | Status: SHIPPED | OUTPATIENT
Start: 2017-08-02 | End: 2017-10-13 | Stop reason: SDUPTHER

## 2017-08-02 RX ORDER — TRIHEXYPHENIDYL HYDROCHLORIDE 5 MG/1
5 TABLET ORAL 3 TIMES DAILY
Qty: 90 TABLET | Refills: 3 | Status: SHIPPED | OUTPATIENT
Start: 2017-08-02 | End: 2017-10-13 | Stop reason: SDUPTHER

## 2017-09-12 RX ORDER — OMEPRAZOLE 40 MG/1
CAPSULE, DELAYED RELEASE ORAL
Qty: 30 CAPSULE | Refills: 3 | Status: SHIPPED | OUTPATIENT
Start: 2017-09-12 | End: 2018-01-04 | Stop reason: SDUPTHER

## 2017-10-13 ENCOUNTER — OFFICE VISIT (OUTPATIENT)
Dept: PSYCHIATRY | Age: 62
End: 2017-10-13
Payer: MEDICARE

## 2017-10-13 VITALS
DIASTOLIC BLOOD PRESSURE: 74 MMHG | WEIGHT: 188.8 LBS | HEIGHT: 63 IN | HEART RATE: 86 BPM | BODY MASS INDEX: 33.45 KG/M2 | SYSTOLIC BLOOD PRESSURE: 117 MMHG

## 2017-10-13 DIAGNOSIS — F31.9 BIPOLAR I DISORDER (HCC): ICD-10-CM

## 2017-10-13 PROCEDURE — 99999 PR OFFICE/OUTPT VISIT,PROCEDURE ONLY: CPT | Performed by: NURSE PRACTITIONER

## 2017-10-13 PROCEDURE — 1036F TOBACCO NON-USER: CPT | Performed by: NURSE PRACTITIONER

## 2017-10-13 PROCEDURE — 90832 PSYTX W PT 30 MINUTES: CPT | Performed by: NURSE PRACTITIONER

## 2017-10-13 RX ORDER — OXCARBAZEPINE 300 MG/1
TABLET, FILM COATED ORAL
Qty: 60 TABLET | Refills: 3 | Status: SHIPPED | OUTPATIENT
Start: 2017-10-13 | End: 2018-02-13 | Stop reason: SDUPTHER

## 2017-10-13 RX ORDER — ESCITALOPRAM OXALATE 10 MG/1
TABLET ORAL
Qty: 30 TABLET | Refills: 3 | Status: SHIPPED | OUTPATIENT
Start: 2017-10-13 | End: 2017-12-12 | Stop reason: DRUGHIGH

## 2017-10-13 RX ORDER — ARIPIPRAZOLE 20 MG/1
20 TABLET ORAL DAILY
Qty: 30 TABLET | Refills: 3 | Status: SHIPPED | OUTPATIENT
Start: 2017-10-13 | End: 2017-12-12 | Stop reason: SDUPTHER

## 2017-10-13 RX ORDER — TRIHEXYPHENIDYL HYDROCHLORIDE 5 MG/1
5 TABLET ORAL 3 TIMES DAILY
Qty: 90 TABLET | Refills: 3 | Status: SHIPPED | OUTPATIENT
Start: 2017-10-13 | End: 2017-12-12 | Stop reason: SDUPTHER

## 2017-10-13 RX ORDER — TRAZODONE HYDROCHLORIDE 50 MG/1
TABLET ORAL
Qty: 60 TABLET | Refills: 2 | Status: SHIPPED | OUTPATIENT
Start: 2017-10-13 | End: 2017-12-12 | Stop reason: SDUPTHER

## 2017-10-13 NOTE — PROGRESS NOTES
tablet twice a day.  8/2/17  Yes BETY Farmer   escitalopram (LEXAPRO) 10 MG tablet Take 1/2 tablet daily 8/2/17  Yes BETY Farmer   ARIPiprazole (ABILIFY) 20 MG tablet Take 1 tablet by mouth daily 8/2/17  Yes BETY Farmer   traZODone (DESYREL) 50 MG tablet Take one or two tablets at bedtime prn sleep 8/2/17  Yes BETY Farmer   trihexyphenidyl (ARTANE) 5 MG tablet Take 1 tablet by mouth 3 times daily 8/2/17  Yes BETY Farmer   promethazine (PHENERGAN) 25 MG tablet TAKE 1 TABLET BY MOUTH DAILY AS NEEDED 5/18/17  Yes Historical Provider, MD   HYDROcodone-acetaminophen (NORCO) 5-325 MG per tablet TAKE 1 TABLET BY MOUTH TWICE DAILY AS NEEDED DO NOT FILL BEFORE 5-21-17 5/22/17  Yes Historical Provider, MD   ONE TOUCH ULTRA TEST strip 1 ITEM AS DIRECTED 5/31/17  Yes Historical Provider, MD   gabapentin (NEURONTIN) 600 MG tablet Take 600 mg by mouth 2 times daily    Yes Historical Provider, MD   Multiple Vitamins-Minerals (THERAPEUTIC MULTIVITAMIN-MINERALS) tablet Take 1 tablet by mouth daily   Yes Historical Provider, MD   SUMAtriptan (IMITREX) 100 MG tablet Take 100 mg by mouth once as needed for Migraine (MAY REPEAT IN 2 HRS.)   Yes Historical Provider, MD   Mirabegron ER (MYRBETRIQ) 25 MG TB24 Take 25 mg by mouth daily   Yes Historical Provider, MD   levothyroxine (SYNTHROID) 75 MCG tablet Take 75 mcg by mouth   Yes Historical Provider, MD   triamcinolone (NASACORT) 55 MCG/ACT nasal inhaler 2 sprays by Nasal route   Yes Historical Provider, MD   docusate sodium (COLACE) 100 MG capsule Take 100 mg by mouth daily as needed for Constipation   Yes Historical Provider, MD   sucralfate (CARAFATE) 1 GM tablet Take 1 tablet by mouth 4 times daily 2/7/17 2/7/18 Yes Perfecto Mass, CNP   montelukast (SINGULAIR) 10 MG tablet Take 10 mg by mouth daily   Yes Historical Provider, MD   lisinopril (PRINIVIL;ZESTRIL) 20 MG tablet Take 20 mg by mouth daily   Yes Historical Provider, MD   metFORMIN (GLUCOPHAGE) 500 MG tablet Take 500 mg by mouth 2 times daily (with meals)   Yes Historical Provider, MD     MSE:  Patient is  A & O x3. Appearance:  See above,  appropriately dressed for season and age. Cognition:  Recent memory intact , remote memory intact , good fund of knowledge, average  intelligence level. Speech:  normal  Language: Naming: intact; Word Finding: intact  Conversation no evidence of delusions  Behavior:  Cooperative and Good eye contact  Mood: happy  Affect: congruent with mood  Thought Content: no evidence of overt psychosis, delusional thought or suicidal /homicidal ideation or plan  Thought Process: linear, goal directed and coherent  Judgement Insight:  normal and appropriate  Gait and Station:normal gait and station   Musculoskeletal: WNL    Assesment: See above   Miguel Salazar report reviewed per  req. Plan: Pt can increase Melatonin to up to 9 mg qhs prn insomnia. She plans to try to take Melatonin instead of Trazodone and if it works she will stop Trazodone altogether. Pt has OTC Melatonin. Pt states she feels better on 10 mg Lexapro as opposed to the 5 mg she is taking now. Discussed with pt and   that she can increase dose to 10 mg and see how she feels. They are to look for signs of megan and reduce dose to 5 mg if pt exhibits signs of megan. They are given a mood journal. They will call with problems. Continue rest of meds. 1. The risks, benefits, side effects, indications, contraindications, and adverse effects of the medications have been discussed. yes   2. The pt has verbalized understanding and has capacity to give informed consent. 3. The Miguel Salazar report has been reviewed according to [facility] regulations. 4. Supportive therapy offered. 5. Follow up: Return in 3 months  6. The patient has been advised to call with any problems. 7. Controlled substance Treatment Plan:  No Rx  8.  The above listed medications have been continued, modifications in meds and other

## 2017-12-12 ENCOUNTER — OFFICE VISIT (OUTPATIENT)
Dept: PSYCHIATRY | Age: 62
End: 2017-12-12
Payer: MEDICARE

## 2017-12-12 VITALS
WEIGHT: 195.2 LBS | HEART RATE: 71 BPM | SYSTOLIC BLOOD PRESSURE: 124 MMHG | DIASTOLIC BLOOD PRESSURE: 67 MMHG | BODY MASS INDEX: 34.59 KG/M2 | HEIGHT: 63 IN | OXYGEN SATURATION: 99 %

## 2017-12-12 DIAGNOSIS — F31.9 BIPOLAR I DISORDER (HCC): ICD-10-CM

## 2017-12-12 PROCEDURE — 90832 PSYTX W PT 30 MINUTES: CPT | Performed by: NURSE PRACTITIONER

## 2017-12-12 PROCEDURE — 99999 PR OFFICE/OUTPT VISIT,PROCEDURE ONLY: CPT | Performed by: NURSE PRACTITIONER

## 2017-12-12 RX ORDER — TRIHEXYPHENIDYL HYDROCHLORIDE 5 MG/1
5 TABLET ORAL 3 TIMES DAILY
Qty: 90 TABLET | Refills: 3 | Status: SHIPPED | OUTPATIENT
Start: 2017-12-12 | End: 2018-02-13 | Stop reason: SDUPTHER

## 2017-12-12 RX ORDER — ESCITALOPRAM OXALATE 20 MG/1
TABLET ORAL
Qty: 30 TABLET | Refills: 3 | Status: SHIPPED | OUTPATIENT
Start: 2017-12-12 | End: 2017-12-12 | Stop reason: DRUGHIGH

## 2017-12-12 RX ORDER — ESCITALOPRAM OXALATE 10 MG/1
TABLET ORAL
Qty: 45 TABLET | Refills: 2 | Status: SHIPPED | OUTPATIENT
Start: 2017-12-12 | End: 2018-02-13 | Stop reason: SDUPTHER

## 2017-12-12 RX ORDER — TRAZODONE HYDROCHLORIDE 50 MG/1
TABLET ORAL
Qty: 60 TABLET | Refills: 3 | Status: SHIPPED | OUTPATIENT
Start: 2017-12-12 | End: 2018-02-13 | Stop reason: SDUPTHER

## 2017-12-12 RX ORDER — ARIPIPRAZOLE 20 MG/1
20 TABLET ORAL DAILY
Qty: 30 TABLET | Refills: 3 | Status: SHIPPED | OUTPATIENT
Start: 2017-12-12 | End: 2018-02-13 | Stop reason: SDUPTHER

## 2017-12-12 NOTE — PROGRESS NOTES
Historical Provider, MD   metFORMIN (GLUCOPHAGE) 500 MG tablet Take 500 mg by mouth 2 times daily (with meals)    Historical Provider, MD       MSE:  Patient is  A & O x3. Appearance:  well-appearing appropriately dressed for season and age. Cognition:  Recent memory intact , remote memory intact , good fund of knowledge, average  intelligence level. Speech:  normal  Language: Naming: intact; Word Finding: intact  Conversation no evidence of delusions, paranoid, persecutory, grandiose, ideas of reference, thought broadcasting, thought insertion and delusions of control  Behavior:  Cooperative and Good eye contact  Mood: euthymic  Affect: congruent with mood  Thought Content: no evidence of overt psychosis, delusional thought or suicidal /homicidal ideation or plan  Thought Process: linear, goal directed and coherent  Judgement Insight:  normal and appropriate  Gait and Station:normal gait and station   Musculoskeletal: WNL    Assesment: See above   Matt Trevino report reviewed per  req. Plan: Increase Lexapro to 15 mg qhs,  continue rest of meds    1. The risks, benefits, side effects, indications, contraindications, and adverse effects of the medications have been discussed. yes   2. The pt has verbalized understanding and has capacity to give informed consent. 3. The Matt Trevino report has been reviewed according to [facility] regulations. 4. Supportive therapy offered. 5. Follow up: Return in 2 months  6. The patient has been advised to call with any problems.   7. Controlled substance Treatment Plan: No Rx

## 2018-01-30 ENCOUNTER — TELEPHONE (OUTPATIENT)
Dept: GASTROENTEROLOGY | Age: 63
End: 2018-01-30

## 2018-02-13 ENCOUNTER — OFFICE VISIT (OUTPATIENT)
Dept: PSYCHIATRY | Age: 63
End: 2018-02-13
Payer: MEDICARE

## 2018-02-13 VITALS
HEIGHT: 63 IN | OXYGEN SATURATION: 98 % | DIASTOLIC BLOOD PRESSURE: 77 MMHG | HEART RATE: 79 BPM | WEIGHT: 195 LBS | BODY MASS INDEX: 34.55 KG/M2 | SYSTOLIC BLOOD PRESSURE: 132 MMHG

## 2018-02-13 DIAGNOSIS — F31.9 BIPOLAR I DISORDER (HCC): ICD-10-CM

## 2018-02-13 PROCEDURE — 99999 PR OFFICE/OUTPT VISIT,PROCEDURE ONLY: CPT | Performed by: NURSE PRACTITIONER

## 2018-02-13 PROCEDURE — 90832 PSYTX W PT 30 MINUTES: CPT | Performed by: NURSE PRACTITIONER

## 2018-02-13 RX ORDER — OXCARBAZEPINE 300 MG/1
TABLET, FILM COATED ORAL
Qty: 60 TABLET | Refills: 0 | Status: SHIPPED | OUTPATIENT
Start: 2018-02-13 | End: 2018-02-27

## 2018-02-13 RX ORDER — TRAZODONE HYDROCHLORIDE 50 MG/1
TABLET ORAL
Qty: 60 TABLET | Refills: 0 | Status: SHIPPED | OUTPATIENT
Start: 2018-02-13 | End: 2018-02-27

## 2018-02-13 RX ORDER — ESCITALOPRAM OXALATE 10 MG/1
TABLET ORAL
Qty: 45 TABLET | Refills: 0 | Status: SHIPPED | OUTPATIENT
Start: 2018-02-13 | End: 2018-02-27

## 2018-02-13 RX ORDER — TRIHEXYPHENIDYL HYDROCHLORIDE 5 MG/1
5 TABLET ORAL 3 TIMES DAILY
Qty: 90 TABLET | Refills: 0 | Status: SHIPPED | OUTPATIENT
Start: 2018-02-13 | End: 2018-02-20 | Stop reason: DRUGHIGH

## 2018-02-13 RX ORDER — ARIPIPRAZOLE 20 MG/1
20 TABLET ORAL DAILY
Qty: 30 TABLET | Refills: 0 | Status: SHIPPED | OUTPATIENT
Start: 2018-02-13 | End: 2018-02-20 | Stop reason: DRUGHIGH

## 2018-02-13 NOTE — PROGRESS NOTES
2/13/2018 12:20 PM   Progress Note        Karly Krabbe 1955  Psychotherapy Time Spent: 20 min      Psychotherapy Topics: family, financial, health, medications    Subjective: The patient is a 57 y.o.   female who is here for a routine office visit. Last seen by this writer on 12/12/17. Dx: Bipolar I D/O    With her  as usual. Pt is seen alone before  comes in. Pt looks anxious, pale, somewhat disheveled, somewhat disorganized as compared to previous appts. We discussed IOP at the end of this appt. She was screened by [facility] staff and will start February 19th. Per pt, \"We are drowning in our medical bills. It's stressing me out. \" Ongoing issue. This is causing her to be depressed. She does not think she would have depression if her bills were in order. As discussed in our last appt pt Lexapro was increased to15 mg. Her  increased the dose to 20 mg without consulting a provider. Pt and  have a long hx of self directed care and lack of medication adherence. We discussed again the importance of contacting a provider before medication changes are made. They both verbalize understanding but have also in the past. She states the increase in Lexapro \"has helped my depression not get any lower. \"   She brings in an \"Outpt Daily Check In\" sheet from her previous attendance at [facility]. She rates her anxiety as an 8 and depression at 7. Denies SI/HI/AH/VH, paranoia. She is isolating, anhedonic, decreased motivation. Sleep: Off and on. Per her  she is taking Trazodone at midnight along with the rest of her night medications. She is only sleeping about five hours per night and normally sleeps six hours straight. Continues to take only one Trazodone qhs instead of two as we discussed in our last appt. Noni Rutledge said he didn't want me to because I am kind of dopey when I do that. \" We discussed that taking the medication at midnight could easily cause a hangover in the morning.

## 2018-02-16 ENCOUNTER — OFFICE VISIT (OUTPATIENT)
Dept: GASTROENTEROLOGY | Age: 63
End: 2018-02-16
Payer: MEDICARE

## 2018-02-16 VITALS
WEIGHT: 199.2 LBS | HEIGHT: 63 IN | DIASTOLIC BLOOD PRESSURE: 82 MMHG | OXYGEN SATURATION: 97 % | HEART RATE: 73 BPM | BODY MASS INDEX: 35.3 KG/M2 | SYSTOLIC BLOOD PRESSURE: 120 MMHG

## 2018-02-16 DIAGNOSIS — K21.9 CHRONIC GERD: ICD-10-CM

## 2018-02-16 DIAGNOSIS — Z87.19 HISTORY OF ESOPHAGEAL STRICTURE: ICD-10-CM

## 2018-02-16 DIAGNOSIS — R13.19 ESOPHAGEAL DYSPHAGIA: Primary | ICD-10-CM

## 2018-02-16 PROCEDURE — G8482 FLU IMMUNIZE ORDER/ADMIN: HCPCS | Performed by: NURSE PRACTITIONER

## 2018-02-16 PROCEDURE — 3014F SCREEN MAMMO DOC REV: CPT | Performed by: NURSE PRACTITIONER

## 2018-02-16 PROCEDURE — 1036F TOBACCO NON-USER: CPT | Performed by: NURSE PRACTITIONER

## 2018-02-16 PROCEDURE — 99214 OFFICE O/P EST MOD 30 MIN: CPT | Performed by: NURSE PRACTITIONER

## 2018-02-16 PROCEDURE — 3017F COLORECTAL CA SCREEN DOC REV: CPT | Performed by: NURSE PRACTITIONER

## 2018-02-16 PROCEDURE — G8427 DOCREV CUR MEDS BY ELIG CLIN: HCPCS | Performed by: NURSE PRACTITIONER

## 2018-02-16 PROCEDURE — G8417 CALC BMI ABV UP PARAM F/U: HCPCS | Performed by: NURSE PRACTITIONER

## 2018-02-16 RX ORDER — FLUTICASONE PROPIONATE 110 UG/1
1 AEROSOL, METERED RESPIRATORY (INHALATION) 2 TIMES DAILY
COMMUNITY

## 2018-02-16 ASSESSMENT — ENCOUNTER SYMPTOMS
BLOOD IN STOOL: 0
RECTAL PAIN: 0
DIARRHEA: 0
CONSTIPATION: 0
SORE THROAT: 0
COUGH: 0
VOICE CHANGE: 0
TROUBLE SWALLOWING: 1
SHORTNESS OF BREATH: 0
ABDOMINAL PAIN: 0
ABDOMINAL DISTENTION: 0
CHEST TIGHTNESS: 0
VOMITING: 0
NAUSEA: 0
BACK PAIN: 1

## 2018-02-16 NOTE — PROGRESS NOTES
UPPER GASTROINTESTINAL ENDOSCOPY  3/1/2012    Dr. Jl Miner: GERD and gastritis, biopsy neg for intestinal metaplasia, dilation of peptic stricture        Current Outpatient Prescriptions   Medication Sig Dispense Refill    fluticasone (FLOVENT HFA) 110 MCG/ACT inhaler Inhale 1 puff into the lungs 2 times daily      Fluticasone Propionate (FLONASE NA) by Nasal route      ARIPiprazole (ABILIFY) 20 MG tablet Take 1 tablet by mouth daily 30 tablet 0    traZODone (DESYREL) 50 MG tablet Take one or two tablets at bedtime prn insomnia. 60 tablet 0    trihexyphenidyl (ARTANE) 5 MG tablet Take 1 tablet by mouth 3 times daily 90 tablet 0    escitalopram (LEXAPRO) 10 MG tablet Take 1 1/2 tablets qhs 45 tablet 0    OXcarbazepine (TRILEPTAL) 300 MG tablet Take one tablet twice a day. 60 tablet 0    omeprazole (PRILOSEC) 40 MG delayed release capsule TAKE 1 CAPSULE BY MOUTH DAILY 30 capsule 5    gabapentin (NEURONTIN) 600 MG tablet Take 600 mg by mouth 2 times daily       SUMAtriptan (IMITREX) 100 MG tablet Take 100 mg by mouth once as needed for Migraine (MAY REPEAT IN 2 HRS.)      Mirabegron ER (MYRBETRIQ) 25 MG TB24 Take 25 mg by mouth daily      levothyroxine (SYNTHROID) 75 MCG tablet Take 75 mcg by mouth      montelukast (SINGULAIR) 10 MG tablet Take 10 mg by mouth daily      lisinopril (PRINIVIL;ZESTRIL) 20 MG tablet Take 20 mg by mouth daily      metFORMIN (GLUCOPHAGE) 500 MG tablet Take 500 mg by mouth 2 times daily (with meals)      Multiple Vitamins-Minerals (THERAPEUTIC MULTIVITAMIN-MINERALS) tablet Take 1 tablet by mouth daily       No current facility-administered medications for this visit. Allergies   Allergen Reactions    Codeine         reports that she has never smoked. She has never used smokeless tobacco. She reports that she does not drink alcohol or use drugs. Review of Systems   Constitutional: Negative for appetite change, fever and unexpected weight change.    HENT: Positive Thought content normal. Cognition and memory are normal.       Assessment:      1. Esophageal dysphagia    2. Chronic GERD    3. History of esophageal stricture            Plan:      Schedule EGD  Consider empiric dilation for persistent c/o dysphagia     Nothing to eat or drink after midnight. No driving for 24 hours after procedure. Bring a  to procedure. No aspirin, NSAIDs, fish oil 5 days before procedure. Continue current medications as prescribed. Risks, benefits and alternatives to endoscopy were discussed. Patient voices understanding of risk of perforation, bleeding and infection. The risk of perforation is increased with esophageal dilation. Time was allowed for questions which were answered to patients satisfaction. Patient is agreeable to proceed. Plan for anesthesia: MAC  no reported complications    I have discussed with the patient and/or the patient representative the indication, alternatives, and the possible risks and/or complications of the planned procedure and the anesthesia methods.

## 2018-02-20 ENCOUNTER — HOSPITAL ENCOUNTER (OUTPATIENT)
Dept: PSYCHIATRY | Age: 63
Setting detail: THERAPIES SERIES
Discharge: HOME | End: 2018-02-20
Payer: MEDICARE

## 2018-02-20 VITALS
HEART RATE: 58 BPM | OXYGEN SATURATION: 96 % | DIASTOLIC BLOOD PRESSURE: 69 MMHG | TEMPERATURE: 97.9 F | SYSTOLIC BLOOD PRESSURE: 110 MMHG | HEIGHT: 63 IN | RESPIRATION RATE: 18 BRPM | WEIGHT: 195 LBS | BODY MASS INDEX: 34.55 KG/M2

## 2018-02-20 DIAGNOSIS — F31.9 BIPOLAR I DISORDER (HCC): Primary | ICD-10-CM

## 2018-02-20 PROCEDURE — 90837 PSYTX W PT 60 MINUTES: CPT | Performed by: SOCIAL WORKER

## 2018-02-20 PROCEDURE — 90792 PSYCH DIAG EVAL W/MED SRVCS: CPT | Performed by: NURSE PRACTITIONER

## 2018-02-20 RX ORDER — ARIPIPRAZOLE 10 MG/1
15 TABLET ORAL DAILY
Qty: 45 TABLET | Refills: 0
Start: 2018-02-20 | End: 2018-02-27

## 2018-02-20 RX ORDER — PHENOL 1.4 %
AEROSOL, SPRAY (ML) MUCOUS MEMBRANE NIGHTLY
COMMUNITY
End: 2018-02-27

## 2018-02-20 RX ORDER — TRIHEXYPHENIDYL HYDROCHLORIDE 5 MG/1
5 TABLET ORAL 2 TIMES DAILY
Qty: 90 TABLET | Refills: 0
Start: 2018-02-20 | End: 2018-02-27

## 2018-02-20 ASSESSMENT — PAIN DESCRIPTION - PROGRESSION: CLINICAL_PROGRESSION: NOT CHANGED

## 2018-02-20 ASSESSMENT — PAIN DESCRIPTION - DESCRIPTORS: DESCRIPTORS: HEADACHE;ACHING

## 2018-02-20 ASSESSMENT — PAIN DESCRIPTION - FREQUENCY: FREQUENCY: INTERMITTENT

## 2018-02-20 ASSESSMENT — SLEEP AND FATIGUE QUESTIONNAIRES
AVERAGE NUMBER OF SLEEP HOURS: 5
SLEEP PATTERN: DIFFICULTY FALLING ASLEEP;DIFFICULTY ARISING;DISTURBED/INTERRUPTED SLEEP;EARLY AWAKENING;INSOMNIA
RESTFUL SLEEP: NO
DIFFICULTY ARISING: YES
DO YOU USE A SLEEP AID: YES
DIFFICULTY STAYING ASLEEP: YES
DO YOU HAVE DIFFICULTY SLEEPING: YES
DIFFICULTY FALLING ASLEEP: YES

## 2018-02-20 ASSESSMENT — PAIN DESCRIPTION - ONSET: ONSET: ON-GOING

## 2018-02-20 ASSESSMENT — PATIENT HEALTH QUESTIONNAIRE - PHQ9: SUM OF ALL RESPONSES TO PHQ QUESTIONS 1-9: 22

## 2018-02-20 ASSESSMENT — LIFESTYLE VARIABLES: HISTORY_ALCOHOL_USE: NO

## 2018-02-20 ASSESSMENT — PAIN DESCRIPTION - PAIN TYPE: TYPE: ACUTE PAIN;CHRONIC PAIN

## 2018-02-20 ASSESSMENT — PAIN DESCRIPTION - ORIENTATION: ORIENTATION: MID;LOWER

## 2018-02-20 ASSESSMENT — PAIN SCALES - GENERAL: PAINLEVEL_OUTOF10: 4

## 2018-02-20 NOTE — BH NOTE
PSYCHIATRIC EVALUATION    Date of Service:  2/20/2018    CC:  Bipolar I Disorder    HISTORY OF PRESENT ILLNESS  The patient is a 57 y.o.   female who is here for evaluation and admission to Intensive Outpatient Program after referral to [facility] by Brittanie ALBERT who has seen her in [facility] outpatient clinic recently. She was previously admitted to [facility] in March, 2017. She complains of increasing depression and anxiety. She is accompanied by her . Source of information is her medical record, the patient, patient's . Her 2017 admission to [facility] followed an inpatient psychiatric admission to [facility] unit. She carries a diagnosis of Bipolar I Disorder and has experienced psychotic features in the past.  She was also hospitalized in 2017 prior to her psych admission for lithium toxicity. Has had several episodes of megan over the past thirty years, and some periods of depression. In the past few months she is increasingly depressed. Has periods of hopelessness, crying, poor sleep, poor focus, memory problems, racing thoughts at times (\"briefly\"). ADLs are decreased.  has been supportive. Both have been worried about their finances, but recently got some debt on medical bills \"written off\" which was a Ana Lilia relief. \"  Gets very anxious around people. Is \"fidgety\". At a recent clinic appointment with Brittanie ALBERT artane was increased to tid from bid. Sleeps at most 6 hours at night. Typically takes one Trazodone 50 mg hs. Sometimes naps in a.m.  at night; sometimes nap in a.m. Rates depression at \"at least a 7/10\". Has been on same medications for \"quite a while, maybe a few years. \" Sometimes has involuntary chin/jaw movements laterally. Often sleeps fair to poorly. She is prescribed trazodone and typically takes 50 mg hs. She complains also of increased stress in recent months.  Her relationship with her son, her only child, has deteriorated to some extent since she was in [facility] last year. She and her  have financial stressors. She feels overwhelmed with anxiety at times. She complains of guilt feelings about past mistakes and has difficulty forgiving herself for behavior she regrets. She admits she has passive suicidal ideation at times. She has history of one past serious suicide attempt in 1995 and several attempts in her 25s. She denies active SI or plan or intent. She denies homicidal or aggressive violent ideation. She also denies substance abuse.  does have a long history of alcoholism and has been sober for one year. He prepares her medications. She complains of problems with short and long term memory. Her ADLs are impaired. She has had multiple health problems. Major stressors: her own mental health, her and 's financial problems, some marital conflict, strained relationship with son; Feelings of guilt and regret about her past behavior. SUBSTANCE ABUSE HISTORY:  Drank heavily in the past, \"years ago. \" Quit drinking \"years ago. \"  Experimented with marijuana as a teenager. FAMILY PSYCHIATRIC HISTORY:  Mother was bipolar and never treated until late in life. Several other maternal relatives have or had bipolar disorder, maternal GM, aunts, uncles, cousins. P GM had \"a lot of anger. \"      PSYCHIATRIC HISTORY:   Psychiatric admission March, 2017 from 3-3-17 to 3-7-17. [address] [place] [address] admission 3-15-17. Most recent outpatient treatment has been by Rock ALBERT at [address] [place] [address] OP clinic. Hospitalized at [address] [place] [address] inpatient unit winter, 2015-16. \"I had a nervous breakdown. \"  [facility] also [facility]  and by a provider in [place], [place]. Review of Systems - depressed, anxious, sometimes feels others talk about her. Racing thoughts \"once in a while, not for long\".   Low energy, low motivation, decreased focus, issues with short

## 2018-02-22 ENCOUNTER — HOSPITAL ENCOUNTER (OUTPATIENT)
Dept: PSYCHIATRY | Age: 63
Setting detail: THERAPIES SERIES
Discharge: HOME | End: 2018-02-22
Payer: MEDICARE

## 2018-02-22 PROCEDURE — 90853 GROUP PSYCHOTHERAPY: CPT

## 2018-02-22 NOTE — PLAN OF CARE
Problem: Altered Mood, Manic Behavior:  Goal: Able to verbalize decrease in frequency and intensity of racing thoughts  Able to verbalize decrease in frequency and intensity of racing thoughts   Outcome: Ongoing                                                                      Group Therapy Note    Date: 2/22/2018  Start Time: 830  End Time:  945  Number of Participants: 11    Type of Group: Psychotherapy    Patient's Goal:  To process emotions and how those relate to others and those relationships    Notes:  Pt was present for group. Pt participated and was able to identify an issue to work on today and how this relates to others. Pt reported she has been very depressed and decided to come to [facility] before she got very bad and ended up inpatient. The group offered support and encouragement. Status After Intervention:  Unchanged    Participation Level: Active Listener and Interactive    Participation Quality: Appropriate, Attentive, Sharing and Supportive      Speech:  normal      Thought Process/Content: Logical  Linear      Affective Functioning: Congruent       Mood: anxious and depressed      Level of consciousness:  Alert, Oriented x4      Response to Learning: Able to verbalize current knowledge/experience, Able to verbalize/acknowledge new learning, Able to retain information      Endings: None Reported    Modes of Intervention: Education, Support, Socialization, Exploration and Clarifying      Discipline Responsible: /Counselor      Signature:   Cynthia Urbina

## 2018-02-22 NOTE — PROGRESS NOTES
ADMINISTRATIVE NOTE:  Reviewed and approved group notes authored by JORDY Lockwood today. We discussed progress of this patient and group issues and participation level.

## 2018-02-23 ENCOUNTER — HOSPITAL ENCOUNTER (OUTPATIENT)
Dept: PSYCHIATRY | Age: 63
Setting detail: THERAPIES SERIES
Discharge: HOME | End: 2018-02-23
Payer: MEDICARE

## 2018-02-23 PROCEDURE — 90853 GROUP PSYCHOTHERAPY: CPT | Performed by: SOCIAL WORKER

## 2018-02-23 PROCEDURE — 90832 PSYTX W PT 30 MINUTES: CPT | Performed by: NURSE PRACTITIONER

## 2018-02-23 NOTE — PLAN OF CARE
Problem: Altered Mood, Manic Behavior:  Goal: Ability to demonstrate self-control will improve  Ability to demonstrate self-control will improve   Outcome: Ongoing                                                                      Group Therapy Note    Date: 2/23/2018  Start Time: 1000  End Time:  1100  Number of Participants: 10    Type of Group: Psychotherapy    Patient's Goal:  To process emotions and how those relate to others and those relationships    Notes:  Pt was present for group. Pt participated and was able to identify an issue to work on today and how this relates to others. Pt offered feed back to other group members as needed. The group offered support and encouragement. Status After Intervention:  Unchanged    Participation Level: Active Listener and Interactive    Participation Quality: Appropriate, Attentive, Sharing and Supportive      Speech:  normal      Thought Process/Content: Logical  Linear      Affective Functioning: Blunted      Mood: anxious and depressed      Level of consciousness:  Alert, Oriented x4 and Attentive      Response to Learning: Able to verbalize current knowledge/experience, Able to verbalize/acknowledge new learning, Able to retain information      Endings: None Reported    Modes of Intervention: Education, Support, Socialization, Exploration, Clarifying and Reality-testing      Discipline Responsible: /Counselor      Signature:   Dia Vaz

## 2018-02-23 NOTE — BH NOTE
mouth daily 2/20/18   BETY Rowland   trihexyphenidyl (ARTANE) 5 MG tablet Take 1 tablet by mouth 2 times daily 2/20/18   BETY Rowland   fluticasone (FLOVENT HFA) 110 MCG/ACT inhaler Inhale 1 puff into the lungs 2 times daily    Historical Provider, MD   Fluticasone Propionate (FLONASE NA) by Nasal route daily as needed     Historical Provider, MD   traZODone (DESYREL) 50 MG tablet Take one or two tablets at bedtime prn insomnia. 2/13/18   BETY Anna   escitalopram (LEXAPRO) 10 MG tablet Take 1 1/2 tablets qhs  Patient taking differently: 2/20/18 Pt reports now taking 20 mg daily as directed. 2/13/18   BETY Anna   OXcarbazepine (TRILEPTAL) 300 MG tablet Take one tablet twice a day. 2/13/18   BETY Anna   omeprazole (PRILOSEC) 40 MG delayed release capsule TAKE 1 CAPSULE BY MOUTH DAILY 1/4/18   BETY Singh   gabapentin (NEURONTIN) 600 MG tablet Take 600 mg by mouth 2 times daily     Historical Provider, MD   Multiple Vitamins-Minerals (THERAPEUTIC MULTIVITAMIN-MINERALS) tablet Take 1 tablet by mouth daily    Historical Provider, MD   SUMAtriptan (IMITREX) 100 MG tablet Take 100 mg by mouth once as needed for Migraine (MAY REPEAT IN 2 HRS.)    Historical Provider, MD   Mirabegron ER (MYRBETRIQ) 25 MG TB24 Take 25 mg by mouth daily 2/20/19 pt reports Dr. Ian Shane told her to take 50 mg daily. Historical Provider, MD   levothyroxine (SYNTHROID) 75 MCG tablet Take 75 mcg by mouth    Historical Provider, MD   montelukast (SINGULAIR) 10 MG tablet Take 10 mg by mouth daily    Historical Provider, MD   lisinopril (PRINIVIL;ZESTRIL) 20 MG tablet Take 20 mg by mouth daily    Historical Provider, MD   metFORMIN (GLUCOPHAGE) 500 MG tablet Take 500 mg by mouth 2 times daily (with meals)    Historical Provider, MD      MSE:  Patient is  A & O x3. Appearance:  well-appearing appropriately dressed for season and age.   Cognition:  Recent memory intact , remote memory intact , good

## 2018-02-27 ENCOUNTER — HOSPITAL ENCOUNTER (OUTPATIENT)
Dept: PSYCHIATRY | Age: 63
Setting detail: THERAPIES SERIES
Discharge: HOME | End: 2018-02-27
Payer: MEDICARE

## 2018-02-27 DIAGNOSIS — F31.9 BIPOLAR I DISORDER (HCC): ICD-10-CM

## 2018-02-27 PROCEDURE — 90853 GROUP PSYCHOTHERAPY: CPT | Performed by: SOCIAL WORKER

## 2018-02-27 PROCEDURE — 90832 PSYTX W PT 30 MINUTES: CPT | Performed by: NURSE PRACTITIONER

## 2018-02-27 RX ORDER — OXCARBAZEPINE 300 MG/1
TABLET, FILM COATED ORAL
Qty: 60 TABLET | Refills: 1 | Status: SHIPPED | OUTPATIENT
Start: 2018-02-27 | End: 2018-06-28 | Stop reason: SDUPTHER

## 2018-02-27 RX ORDER — TRAZODONE HYDROCHLORIDE 50 MG/1
TABLET ORAL
Qty: 45 TABLET | Refills: 0 | Status: SHIPPED | OUTPATIENT
Start: 2018-02-27 | End: 2018-06-28 | Stop reason: SDUPTHER

## 2018-02-27 RX ORDER — CLONAZEPAM 0.5 MG/1
TABLET ORAL
Qty: 60 TABLET | Refills: 0 | Status: SHIPPED | OUTPATIENT
Start: 2018-02-27 | End: 2018-05-18 | Stop reason: HOSPADM

## 2018-02-27 RX ORDER — ESCITALOPRAM OXALATE 10 MG/1
10 TABLET ORAL DAILY
Qty: 30 TABLET | Refills: 1 | Status: SHIPPED | OUTPATIENT
Start: 2018-02-27 | End: 2018-05-21 | Stop reason: HOSPADM

## 2018-02-27 RX ORDER — ARIPIPRAZOLE 20 MG/1
TABLET ORAL
Qty: 30 TABLET | Refills: 0 | Status: SHIPPED | OUTPATIENT
Start: 2018-02-27 | End: 2018-08-15 | Stop reason: SDUPTHER

## 2018-02-27 RX ORDER — PHENOL 1.4 %
10 AEROSOL, SPRAY (ML) MUCOUS MEMBRANE NIGHTLY
Qty: 30 TABLET | Refills: 0 | Status: SHIPPED | OUTPATIENT
Start: 2018-02-27 | End: 2019-06-27

## 2018-02-27 RX ORDER — TRIHEXYPHENIDYL HYDROCHLORIDE 5 MG/1
5 TABLET ORAL 2 TIMES DAILY
Qty: 90 TABLET | Refills: 0 | Status: SHIPPED | OUTPATIENT
Start: 2018-02-27 | End: 2018-03-28 | Stop reason: DRUGHIGH

## 2018-02-27 NOTE — PLAN OF CARE
Problem: Altered Mood, Manic Behavior:  Goal: Ability to demonstrate self-control will improve  Ability to demonstrate self-control will improve   Outcome: Ongoing                                                                      Group Therapy Note    Date: 2/27/2018  Start Time: 1000  End Time:  1100  Number of Participants: 11    Type of Group: Psychotherapy    Patient's Goal:  Hand Out: What are Personal Boundaries and how those relate to others and those relationships    Notes:  Pt was present for group. Pt participated and was able to identify issues they have setting boundaries and maintaining healthy ones. The group offered support and encouragement. Status After Intervention:  Unchanged    Participation Level: Active Listener and Interactive    Participation Quality: Appropriate, Attentive      Speech:  normal      Thought Process/Content: Logical  Linear      Affective Functioning: Blunted      Mood: anxious and depressed      Level of consciousness:  Alert, Oriented x4 and Attentive      Response to Learning: Able to verbalize current knowledge/experience, Able to verbalize/acknowledge new learning, Able to retain information       Endings: None Reported    Modes of Intervention: Education, Support, Socialization, Exploration and Clarifying      Discipline Responsible: /Counselor      Signature:   Carmen Flores

## 2018-02-27 NOTE — PLAN OF CARE
Problem: Pain:  Goal: Control of chronic pain  Control of chronic pain   Outcome: Ongoing  D:  Pt here for IOP today.   She rates her pain level a 1 with 10 being the highest.

## 2018-02-27 NOTE — PLAN OF CARE
Problem: Falls - Risk of:  Goal: Will remain free from falls  Will remain free from falls   Outcome: Ongoing  D:  Pt here for IOP today. Gait is steady. No c/o dizziness or drowsiness.

## 2018-02-27 NOTE — BH NOTE
2/27/2018 6:36 PM   Progress Note        Jeanie Vásquez 1955  Med Mgt and Psychotherapy Time Spent: 25 minutes       Psychotherapy Topics: stressors, coping, meds    CC: Bipolar I Disorder    Subjective:    56 yo CF here for IOP. She reports she is sleeping poorly, not over 4 hours in 24 hours, and sleeps no more than   45 minutes at a time. She is taking trazodone 50 mg I tablet at bedtime for sleep. She has tried two tablets but felt \"hungover\" the following day. We discussed trying 1.5 tablets to see if she is able to sleep without feeling hung over and she agrees to try this dose. She continues to feel depressed and anxious. She rates anxiety and depression at 7-8/10 (10= worst). She adamantly denies any suicidal or homicidal ideation. She also denies any auditory or visual hallucinations or paranoid ideation. She worries about her relationship with her son, to whom she has confided some of her behaviors that she believes have contributed to marital conflicts. She says she did this \"because he has always blamed his dad. My telling him has damaged our relationship. \"  Complains of feeling \"really tired. Not getting much sleep. \"  Complains of thoughts racing at times; \"it comes and goes. \"  Has a hard time getting back to sleep after waking up. Worries about her son's opinion of her. Ruminates about this. Having some difficulty with ADLs due to anxiety and depressive s/s, poor sleep, fatigue. Patient reports side effects as follows: none reported. Denies s/s of akathisia. \"I've had that before and I don't have that anymore. \"     Reports NO suicidal ideation. Reports compliance with medications as good. States  prepares and dispenses her medications. Review of Systems - anxious, depressed. Current Meds:    Prior to Admission medications    Medication Sig Start Date End Date Taking? Authorizing Provider   OXcarbazepine (TRILEPTAL) 300 MG tablet Take one tablet twice a day.  2/27/18  OXcarbazepine (TRILEPTAL) 300 MG tablet     Sig: Take one tablet twice a day. Dispense:  60 tablet     Refill:  1    escitalopram (LEXAPRO) 10 MG tablet     Sig: Take 1 tablet by mouth daily     Dispense:  30 tablet     Refill:  1    traZODone (DESYREL) 50 MG tablet     Sig: Take 1 and 1/2  tablets at bedtime prn insomnia. Dispense:  45 tablet     Refill:  0    trihexyphenidyl (ARTANE) 5 MG tablet     Sig: Take 1 tablet by mouth 2 times daily     Dispense:  90 tablet     Refill:  0    ARIPiprazole (ABILIFY) 20 MG tablet     Sig: Take one tablet daily     Dispense:  30 tablet     Refill:  0    Melatonin 10 MG TABS     Sig: Take 10 mg by mouth nightly     Dispense:  30 tablet     Refill:  0    clonazePAM (KLONOPIN) 0.5 MG tablet     Sig: Take 1/2 or 1 tablet bid prn anxiety or sleep. Dispense:  60 tablet     Refill:  0      No orders of the defined types were placed in this encounter. 10. Additional comments:    Discussed with patient the possibility of inpatient stay for medication adjustment if s/s do not improve with these changes of medication. She is quite reluctant to try another atypical antipsychotic med other than Abilify because she believes this has been more helpful than another atypical she has had in the past.  (which?)  She says she will think about it. Is unwilling to consider this today. Says that \"if I don't start feeling better I'll think about it. \"  Is not a candidate for involuntary hold. Has no SI, no HI, and does not pose a danger to herself or others at this time.     Isaac Rangel APRN

## 2018-03-01 ENCOUNTER — HOSPITAL ENCOUNTER (OUTPATIENT)
Dept: PSYCHIATRY | Age: 63
Setting detail: THERAPIES SERIES
Discharge: HOME | End: 2018-03-01
Payer: MEDICARE

## 2018-03-01 PROCEDURE — 90853 GROUP PSYCHOTHERAPY: CPT

## 2018-03-02 ENCOUNTER — HOSPITAL ENCOUNTER (OUTPATIENT)
Dept: PSYCHIATRY | Age: 63
Setting detail: THERAPIES SERIES
Discharge: HOME | End: 2018-03-02
Payer: MEDICARE

## 2018-03-02 PROCEDURE — 90853 GROUP PSYCHOTHERAPY: CPT | Performed by: SOCIAL WORKER

## 2018-03-02 NOTE — PLAN OF CARE
Problem: Altered Mood, Manic Behavior:  Goal: Mood stable  Mood stable   Outcome: Ongoing                                                                      Group Therapy Note    Date: 3/2/2018  Start Time: 0830  End Time:  0945  Number of Participants: 10    Type of Group: Psychotherapy    Patient's Goal:  Process emotions and actions in how to relate to others or their relationship with others. Notes:  Pt attended process group as schedule. Pt participated by identified an issue to work on today regarding how they interact and relate to others. Participated in group discussion. Pt gave support and encouragement to group members. Pt reported she was working toward letting go of negative feelings toward herself as a result of her son being estranged from her. Pt was looking forward to her [employer] meeting to lay hands on her and pray. Status After Intervention:  Improved    Participation Level:  Active Listener and Interactive    Participation Quality: Appropriate, Attentive, Sharing and Supportive      Speech:  normal      Thought Process/Content: Logical  Linear      Affective Functioning: Congruent      Mood: anxious and depressed      Level of consciousness:  Alert, Oriented x4 and Attentive      Response to Learning: Able to verbalize current knowledge/experience, Able to change behavior and Progressing to goal      Endings: None Reported    Modes of Intervention: Education, Support, Socialization, Exploration and Clarifying      Discipline Responsible: /Counselor      Signature:  JORDY Whitfield Mt

## 2018-03-02 NOTE — PLAN OF CARE
Problem: Altered Mood, Manic Behavior:  Goal: Absence of self-harm  Absence of self-harm   Outcome: Ongoing                                                                      Group Therapy Note    Date: 3/2/2018  Start Time: 1115  End Time:  1215  Number of Participants: 8    Type of Group: Cognitive Skills    Patient's Goal:  Handout: I feel. Process emotions and actions in how to relate to others or their relationship with others. Notes:  Pt attended process group as schedule. Pt participated by identified an issue to work on today regarding how they interact and relate to others. Participated in group discussion. Pt gave support and encouragement to group members. Status After Intervention:  Unchanged    Participation Level:  Active Listener and Interactive    Participation Quality: Appropriate, Attentive, Sharing and Supportive      Speech:  normal      Thought Process/Content: Logical  Linear      Affective Functioning: Congruent      Mood: depressed      Level of consciousness:  Alert, Oriented x4 and Attentive      Response to Learning: Able to verbalize current knowledge/experience and Progressing to goal      Endings: None Reported    Modes of Intervention: Education, Support, Socialization, Exploration and Clarifying      Discipline Responsible: /Counselor      Signature:  JORDY Adam

## 2018-03-02 NOTE — PLAN OF CARE
Problem: Altered Mood, Manic Behavior:  Goal: Ability to disclose and discuss suicidal ideas will improve  Ability to disclose and discuss suicidal ideas will improve   Outcome: Ongoing                                                                      Group Therapy Note    Date: 3/2/2018  Start Time: 1000  End Time:  1100  Number of Participants: 9    Type of Group: Psychoeducation    Patient's Goal:  Handout: Definitions of Common Cognitive Distortions. Process emotions and actions in how to relate to others or their relationship with others. Notes:  Pt attended process group as schedule. Pt participated by identified an issue to work on today regarding how they interact and relate to others. Participated in group discussion. Pt gave support and encouragement to group members. Status After Intervention:  Unchanged    Participation Level:  Active Listener and Interactive    Participation Quality: Appropriate, Attentive, Sharing and Supportive      Speech:  normal      Thought Process/Content: Logical  Linear      Affective Functioning: Congruent      Mood: depressed      Level of consciousness:  Alert, Oriented x4 and Attentive      Response to Learning: Able to verbalize current knowledge/experience and Progressing to goal      Endings: None Reported    Modes of Intervention: Education, Support, Socialization, Exploration and Clarifying      Discipline Responsible: /Counselor      Signature:  JORDY Albarran

## 2018-03-05 ENCOUNTER — HOSPITAL ENCOUNTER (OUTPATIENT)
Dept: PSYCHIATRY | Age: 63
Setting detail: THERAPIES SERIES
Discharge: HOME | End: 2018-03-05
Payer: MEDICARE

## 2018-03-05 PROCEDURE — 90853 GROUP PSYCHOTHERAPY: CPT

## 2018-03-05 NOTE — PROGRESS NOTES
ADMINISTRATIVE NOTE:  Reviewed and approved group notes authored by Karla Salazar on 03/02/2018. We discussed progress of this patient and group issues and participation level.

## 2018-03-07 ENCOUNTER — HOSPITAL ENCOUNTER (OUTPATIENT)
Dept: PSYCHIATRY | Age: 63
Setting detail: THERAPIES SERIES
Discharge: HOME | End: 2018-03-07
Payer: MEDICARE

## 2018-03-07 PROCEDURE — 90853 GROUP PSYCHOTHERAPY: CPT

## 2018-03-07 NOTE — PLAN OF CARE
Problem: Altered Mood, Manic Behavior:  Goal: Ability to interact with others will improve  Ability to interact with others will improve   Outcome: Ongoing                                                                      Group Therapy Note    Date: 3/7/2018  Start Time: 1000  End Time:  1100  Number of Participants: 10    Type of Group: Psychoeducation    Wellness Binder Information  Module Name:  Stress  Session Number:  5    Patient's Goal:  To improve knowledge of effective stress management techniques    Notes:  Pt participated in group discussion, identified effective stress management techniques.     Status After Intervention:  Improved    Participation Level: Interactive    Participation Quality: Appropriate and Attentive      Speech:  normal      Thought Process/Content: Logical      Affective Functioning: Congruent      Mood: anxious and depressed      Level of consciousness:  Alert and Oriented x4      Response to Learning: Able to verbalize current knowledge/experience, Able to verbalize/acknowledge new learning and Progressing to goal      Endings: None Reported    Modes of Intervention: Education      Discipline Responsible: Psychoeducational Specialist      Signature:  Marybeth Mcallister

## 2018-03-07 NOTE — PLAN OF CARE
Problem: Altered Mood, Manic Behavior:  Goal: STG-Medication therapy compliance  Pt will report any med side effects to staff. Outcome: Ongoing  D:  Pt attended IOP this am.  She requested a copy of her current med list so she could make sure she was taking her meds correctly. Pt was given a copy of current med list with beh health meds highlighted. Pt verbalized understanding.

## 2018-03-07 NOTE — PLAN OF CARE
Problem: Altered Mood, Manic Behavior:  Goal: Ability to demonstrate self-control will improve  Ability to demonstrate self-control will improve   Outcome: Ongoing                                                                      Group Therapy Note    Date: 3/7/2018  Start Time: 1115  End Time:  1215  Number of Participants: 7    Type of Group: Psychotherapy    Patient's Goal:  Hand Out: Positive Straits and Chapters of my life and how those relate to others and those relationships    Notes:  Pt was present for group. Pt participated and was able to identify positive traits they possess and offered feed back to other group members to identify additional positive qualities they possess. The group offered support and encouragement. Status After Intervention:  Unchanged    Participation Level: Active Listener and Interactive    Participation Quality: Appropriate, Attentive, Sharing and Supportive      Speech:  normal      Thought Process/Content: Logical  Linear      Affective Functioning: Congruent      Mood: anxious       Level of consciousness:  Alert, Oriented x4 and Attentive      Response to Learning: Able to verbalize current knowledge/experience, Able to verbalize/acknowledge new learning, Able to retain information, Capable of Insight      Endings: None Reported    Modes of Intervention: Education, Support, Socialization, Exploration and Clarifying      Discipline Responsible: /Counselor      Signature:   Grant Lyles

## 2018-03-07 NOTE — PLAN OF CARE
Problem: Altered Mood, Manic Behavior:  Goal: Ability to interact with others will improve  Ability to interact with others will improve   Outcome: Ongoing                                                                      Group Therapy Note    Date: 3/7/2018  Start Time: 830  End Time:  945  Number of Participants: 10    Type of Group: Psychotherapy    Patient's Goal:  To process emotions and how those relate to others and those relationships    Notes:  Pt was present for group. Pt participated and was able to identify an issue to work on today and how this relates to others. Pt stated she is feeling a little better and feels this is because she has been able to let go of some things and has been happier as a result. The group offered support and encouragement. Status After Intervention:  Unchanged    Participation Level: Active Listener and Interactive    Participation Quality: Appropriate, Attentive, Sharing and Supportive      Speech:  normal      Thought Process/Content: Logical  Linear      Affective Functioning: Congruent      Mood: anxious and anger      Level of consciousness:  Alert, Oriented x4 and Attentive      Response to Learning: Able to verbalize current knowledge/experience, Able to verbalize/acknowledge new learning, Able to retain information, Capable of insight and Able to change behavior      Endings: None Reported    Modes of Intervention: Education, Support, Socialization, Exploration and Clarifying      Discipline Responsible: /Counselor      Signature:   Martín Wyatt

## 2018-03-08 ENCOUNTER — HOSPITAL ENCOUNTER (OUTPATIENT)
Dept: PSYCHIATRY | Age: 63
Setting detail: THERAPIES SERIES
Discharge: HOME | End: 2018-03-08
Payer: MEDICARE

## 2018-03-08 PROCEDURE — 90853 GROUP PSYCHOTHERAPY: CPT | Performed by: SOCIAL WORKER

## 2018-03-12 ENCOUNTER — HOSPITAL ENCOUNTER (OUTPATIENT)
Dept: PSYCHIATRY | Age: 63
Setting detail: THERAPIES SERIES
Discharge: HOME | End: 2018-03-12
Payer: MEDICARE

## 2018-03-12 PROCEDURE — 90853 GROUP PSYCHOTHERAPY: CPT

## 2018-03-12 NOTE — PLAN OF CARE
Problem: Falls - Risk of:  Goal: Will remain free from falls  Will remain free from falls   Outcome: Ongoing  D:  Pt here for IOP this am.  Gait is steady. No c/o dizziness or drowsiness. Gait is steady.

## 2018-03-12 NOTE — PLAN OF CARE
Problem: Altered Mood, Manic Behavior:  Goal: Ability to demonstrate self-control will improve  Ability to demonstrate self-control will improve   Outcome: Ongoing                                                                      Group Therapy Note    Date: 3/12/2018  Start Time: 1000  End Time:  1100  Number of Participants: 9    Type of Group: Psychotherapy    Patient's Goal: Hand Out: Triggers and to process emotions and how those relate to others and those relationships    Notes:  Pt was present for group. Pt participated and was able to identify some of their triggers and ways to deal with these and how this relates to others. The group offered support and encouragement. Status After Intervention:  Unchanged    Participation Level: Active Listener and Interactive    Participation Quality: Appropriate, Attentive, Sharing and Supportive      Speech:  normal      Thought Process/Content: Logical  Linear      Affective Functioning: Congruent      Mood: anxious and depressed and angry      Level of consciousness:  Alert, Oriented x4 and Attentive      Response to Learning: Able to verbalize current knowledge/experience, Able to verbalize/acknowledge new learning, Able to retain information       Endings: None Reported    Modes of Intervention: Education, Support, Socialization and Exploration and Clarifying      Discipline Responsible: /Counselor      Signature:   Vic Anders

## 2018-03-12 NOTE — PROGRESS NOTES
ADMINISTRATIVE NOTE:  Reviewed and approved group notes authored by JORDY Walton today. We discussed progress of this patient and group issues and participation level.

## 2018-03-12 NOTE — PLAN OF CARE
Problem: Altered Mood, Manic Behavior:  Goal: Able to verbalize decrease in frequency and intensity of racing thoughts  Able to verbalize decrease in frequency and intensity of racing thoughts   Outcome: Ongoing                                                                      Group Therapy Note    Date: 3/12/2018  Start Time: 830  End Time:  945  Number of Participants: 10    Type of Group: Psychotherapy    Patient's Goal:  To process emotions and how those relate to others and those relationships    Notes:  Pt was present for group. Pt participated and was able to identify an issue to work on today and how this relates to others. Pt  stated she is working on her anger and her guilt. Pt stated she is very angry with her son and feels very bitter about how he is treating her and his father. The group offered feed back as needed. The group offered support and encouragement. Status After Intervention:  Unchanged    Participation Level: Active Listener and Interactive    Participation Quality: Appropriate, Attentive, Sharing and Supportive      Speech:  normal      Thought Process/Content: Logical  Linear      Affective Functioning: Congruent      Mood: anxious and depressed and angry      Level of consciousness:  Alert, Oriented x4 and Attentive      Response to Learning: Able to verbalize current knowledge and experiences, Able to retain new knowledge      Endings: None Reported    Modes of Intervention: Education, Support, Socialization, Exploration and Clarifying      Discipline Responsible: /Counselor      Signature:   Diaz Hall

## 2018-03-12 NOTE — PLAN OF CARE
Problem: Altered Mood, Manic Behavior:  Goal: STG-Medication therapy compliance  Pt will report any med side effects to staff. Outcome: Ongoing  D:  Pt attended IOP this am.  She reported that she is feeling anger and anxious related to issues with her son. Pt reports that she slept 5 hrs last night. Pt reports that she is pacing at times at home. Pt denied any suicidal thoughts. Pt was unsure if she was taking the Klonopin that is prn as her  sets up her meds. Wrote down the info on her Klonopin in regards to dose and schedule for her to review with her . She will find out when she gets home if she has been taking it. If she has not been taking the PRN doses she is going to take and see if it helps. Pt instructed to call writer if she has been taking it. Pt agreeable to this plan.

## 2018-03-14 ENCOUNTER — HOSPITAL ENCOUNTER (OUTPATIENT)
Dept: PSYCHIATRY | Age: 63
Setting detail: THERAPIES SERIES
Discharge: HOME | End: 2018-03-14
Payer: MEDICARE

## 2018-03-14 PROCEDURE — 90853 GROUP PSYCHOTHERAPY: CPT | Performed by: SOCIAL WORKER

## 2018-03-14 NOTE — PLAN OF CARE
Problem: Altered Mood, Manic Behavior:  Goal: Able to verbalize decrease in frequency and intensity of racing thoughts  Able to verbalize decrease in frequency and intensity of racing thoughts   Outcome: Ongoing                                                                      Group Therapy Note    Date: 3/14/2018  Start Time: 830  End Time:  945  Number of Participants: 12    Type of Group: Psychotherapy    Patient's Goal:  To process emotions and how those relate to others and those relationships    Notes:  Pt was present for group. Pt participated and was able to identify an issue to work on today and how this relates to others. Pt stated she is working on letting go of her anger toward her son. The group offered support and encouragement. Status After Intervention:  Unchanged    Participation Level: Active Listener and Interactive    Participation Quality: Appropriate, Attentive, Sharing and Supportive      Speech:  normal      Thought Process/Content: Logical  Linear      Affective Functioning: Congruent      Mood: anxious and depressed and angry      Level of consciousness:  Alert, Oriented x4 and Attentive      Response to Learning: Able to verbalize current knowledge/experience, Able to verbalize/acknowledge new learning, Able to retain information, Capable of insight and Able to change behavior      Endings: None Reported    Modes of Intervention: Education, Support, Socialization, Exploration and Clarifying      Discipline Responsible: /Counselor      Signature:   Sarah Chavez

## 2018-03-15 ENCOUNTER — HOSPITAL ENCOUNTER (OUTPATIENT)
Dept: PSYCHIATRY | Age: 63
Setting detail: THERAPIES SERIES
Discharge: HOME | End: 2018-03-15
Payer: MEDICARE

## 2018-03-15 PROCEDURE — 90853 GROUP PSYCHOTHERAPY: CPT

## 2018-03-16 ENCOUNTER — APPOINTMENT (OUTPATIENT)
Dept: PSYCHIATRY | Age: 63
End: 2018-03-16
Payer: MEDICARE

## 2018-03-19 ENCOUNTER — HOSPITAL ENCOUNTER (OUTPATIENT)
Dept: PSYCHIATRY | Age: 63
Setting detail: THERAPIES SERIES
Discharge: HOME | End: 2018-03-19
Payer: MEDICARE

## 2018-03-19 PROCEDURE — 90853 GROUP PSYCHOTHERAPY: CPT | Performed by: SOCIAL WORKER

## 2018-03-19 NOTE — PLAN OF CARE
Problem: Altered Mood, Manic Behavior:  Goal: STG-Medication therapy compliance  Pt will report any med side effects to staff. Outcome: Ongoing  D:  Pt attended IOP this am.  She rated her depression 3, anger 2 and anxiety 4 with 10 being the highest.  She rated her guilt a 4 and megan a 3 on the same scale. She reported a good appetite and energy level. She denied any suicidal thoughts. She reported that she slept 6 hrs last night. She reported med compliance with no side effects. Pt reported that she would work on patience this am.  Therapist reports that she had a brief period of irritability this am.  A:  Support provided. Denny ALBERT given update on pt this am to include the above info. R:  Pt has mood lability but overall to lesser degree.

## 2018-03-19 NOTE — PLAN OF CARE
Problem: Altered Mood, Manic Behavior:  Goal: Ability to demonstrate self-control will improve  Ability to demonstrate self-control will improve   Outcome: Ongoing                                                                      Group Therapy Note    Date: 3/19/2018  Start Time: 1115  End Time:  1215  Number of Participants: 9    Type of Group: Cognitive Skills    Patient's Goal:  Activity: Draw three pictures that represent your past, present, and future. Process emotions and actions in how to relate to others or their relationship with others. Notes:  Pt attended process group as schedule. Pt participated by drawsing pictures and sharing with the group, thoughts about drawing. Participated in group discussion. Pt gave support and encouragement to group members. Status After Intervention:  Improved    Participation Level:  Active Listener and Interactive    Participation Quality: Appropriate, Attentive, Sharing and Supportive      Speech:  normal      Thought Process/Content: Logical  Linear      Affective Functioning: Congruent      Mood: anxious and depressed      Level of consciousness:  Alert, Oriented x4 and Attentive      Response to Learning: Able to verbalize current knowledge/experience and Progressing to goal      Endings: None Reported    Modes of Intervention: Support, Socialization and Activity      Discipline Responsible: /Counselor      Signature:  JORDY Perez

## 2018-03-19 NOTE — PLAN OF CARE
Problem: Pain:  Goal: Control of chronic pain  Control of chronic pain   Outcome: Ongoing  D:  Pt attended IOP this am.  She reported a low pain level today.

## 2018-03-19 NOTE — PLAN OF CARE
Problem: Altered Mood, Manic Behavior:  Goal: Mood stable  Mood stable   Outcome: Ongoing                                                                      Group Therapy Note    Date: 3/19/2018  Start Time: 1000  End Time:  1100  Number of Participants: 10    Type of Group: Psychotherapy    Patient's Goal:  To process emotions and how those relate to others and those relationships    Notes:  Pt was present for group. Pt participated and was able to identify an issue to work on today and how this relates to others. Pt offered feed back to other group members as needed and participated in group discussion. The group offered support and encouragement. Status After Intervention:  Unchanged    Participation Level: Active Listener and Interactive    Participation Quality: Appropriate, Attentive, Sharing and Supportive      Speech:  normal      Thought Process/Content: Logical  Linear      Affective Functioning: Congruent      Mood: anxious and depressed and angry      Level of consciousness:  Alert and Oriented x4      Response to Learning: Able to verbalize current knowledge/experience, Able to verbalize/acknowledge new learning, Able to retain information, Capable of insight       Endings: None Reported    Modes of Intervention: Education, Support, Socialization, Exploration and Clarifying      Discipline Responsible: /Counselor      Signature:   Randy Killian

## 2018-03-19 NOTE — PROGRESS NOTES
ADMINISTRATIVE NOTE:  Reviewed and approved group notes authored by Marnette Seip Ray,CSW today. We discussed progress of this patient and group issues and participation level.

## 2018-03-21 ENCOUNTER — HOSPITAL ENCOUNTER (OUTPATIENT)
Dept: PSYCHIATRY | Age: 63
Setting detail: THERAPIES SERIES
Discharge: HOME | End: 2018-03-21
Payer: MEDICARE

## 2018-03-21 PROCEDURE — 90853 GROUP PSYCHOTHERAPY: CPT

## 2018-03-21 NOTE — PLAN OF CARE
Problem: Altered Mood, Manic Behavior:  Goal: Ability to interact with others will improve  Ability to interact with others will improve   Outcome: Ongoing                                                                      Group Therapy Note    Date: 3/21/2018  Start Time: 1000  End Time:  1100  Number of Participants: 6    Type of Group: Psychotherapy    Patient's Goal:  How to process emotions and how those relate to others and those relationships    Notes:  Pt was present for group. Pt participated and was able to identify issues to work on today and how this relates to others. Pt stated she is feeling good and is working on her patience and letting her son have some space. Pt offered other group members feed back as needed and participated in group discussion. The group offered support and encouragement. Status After Intervention:  Unchanged    Participation Level: Active Listener and Interactive    Participation Quality: Appropriate, Attentive, Sharing and Supportive      Speech:  normal      Thought Process/Content: Logical  Linear      Affective Functioning: Congruent      Mood: anxious and depressed       Level of consciousness:  Alert, Oriented x4, Attentive       Response to Learning: Able to verbalize current knowledge/experience, Able to verbalize/acknowledge new learning, Able to retain information, Capable of insight and Able to change behavior      Endings: None Reported    Modes of Intervention: Education, Support, Socialization, Exploration and Clarifying      Discipline Responsible: /Counselor      Signature:   Misael Steen

## 2018-03-22 ENCOUNTER — HOSPITAL ENCOUNTER (OUTPATIENT)
Dept: PSYCHIATRY | Age: 63
Setting detail: THERAPIES SERIES
Discharge: HOME | End: 2018-03-22
Payer: MEDICARE

## 2018-03-22 PROCEDURE — 90853 GROUP PSYCHOTHERAPY: CPT | Performed by: SOCIAL WORKER

## 2018-03-22 NOTE — PLAN OF CARE
Problem: Altered Mood, Manic Behavior:  Goal: Able to verbalize decrease in frequency and intensity of racing thoughts  Able to verbalize decrease in frequency and intensity of racing thoughts   Outcome: Ongoing                                                                      Group Therapy Note    Date: 3/22/2018  Start Time: 830  End Time:  945  Number of Participants: 7    Type of Group: Psychotherapy    Patient's Goal:  To process emotions and how those relate to others and those relationships    Notes:  Pt was present for group. Pt participated and was able to identify an issue to work on today and how this relates to others. Pt discussed she is still working on patience and waiting for her son  to process things she has told him and waiting for him to want to talk about it. The group offered support and encouragement. Status After Intervention:  Unchanged    Participation Level: Active Listener and Interactive    Participation Quality: Appropriate, Attentive, Sharing and Supportive      Speech:  normal      Thought Process/Content: Logical  Linear      Affective Functioning: Congruent      Mood: anxious and depressed      Level of consciousness:  Alert, Oriented x4 and Attentive      Response to Learning: Able to verbalize current knowledge/experience, Able to verbalize/acknowledge new learning, Able to retain information and Capable of insight      Endings: None Reported    Modes of Intervention: Education, Support, Socialization, Exploration and Clarifying      Discipline Responsible: /Counselor      Signature:   Uma Sales

## 2018-03-26 NOTE — PROGRESS NOTES
ADMINISTRATIVE NOTE:  Reviewed and approved group notes authored by Karla Salazar on 3/22/18. We discussed progress of this patient and group issues and participation level.

## 2018-03-27 ENCOUNTER — HOSPITAL ENCOUNTER (OUTPATIENT)
Dept: PSYCHIATRY | Age: 63
Setting detail: THERAPIES SERIES
Discharge: HOME | End: 2018-03-27
Payer: MEDICARE

## 2018-03-27 PROCEDURE — 90853 GROUP PSYCHOTHERAPY: CPT | Performed by: SOCIAL WORKER

## 2018-03-28 ENCOUNTER — HOSPITAL ENCOUNTER (OUTPATIENT)
Dept: PSYCHIATRY | Age: 63
Setting detail: THERAPIES SERIES
Discharge: HOME | End: 2018-03-28
Payer: MEDICARE

## 2018-03-28 PROCEDURE — 90853 GROUP PSYCHOTHERAPY: CPT

## 2018-03-28 PROCEDURE — 90832 PSYTX W PT 30 MINUTES: CPT | Performed by: NURSE PRACTITIONER

## 2018-03-28 RX ORDER — TRIHEXYPHENIDYL HYDROCHLORIDE 5 MG/1
5 TABLET ORAL 3 TIMES DAILY
Qty: 90 TABLET | Refills: 0 | Status: SHIPPED | OUTPATIENT
Start: 2018-03-28 | End: 2018-05-23

## 2018-03-28 NOTE — BH NOTE
ARIPiprazole (ABILIFY) 20 MG tablet Take one tablet daily 2/27/18   BETY Rowland   Melatonin 10 MG TABS Take 10 mg by mouth nightly 2/27/18   BETY Rowland   clonazePAM (KLONOPIN) 0.5 MG tablet Take 1/2 or 1 tablet bid prn anxiety or sleep. 2/27/18 2/27/18  BETY Rowland   fluticasone (FLOVENT HFA) 110 MCG/ACT inhaler Inhale 1 puff into the lungs 2 times daily    Historical Provider, MD   Fluticasone Propionate (FLONASE NA) by Nasal route daily as needed     Historical Provider, MD   omeprazole (PRILOSEC) 40 MG delayed release capsule TAKE 1 CAPSULE BY MOUTH DAILY 1/4/18   BETY Singh   gabapentin (NEURONTIN) 600 MG tablet Take 600 mg by mouth 2 times daily     Historical Provider, MD   Multiple Vitamins-Minerals (THERAPEUTIC MULTIVITAMIN-MINERALS) tablet Take 1 tablet by mouth daily    Historical Provider, MD   SUMAtriptan (IMITREX) 100 MG tablet Take 100 mg by mouth once as needed for Migraine (MAY REPEAT IN 2 HRS.)    Historical Provider, MD   Mirabegron ER (MYRBETRIQ) 25 MG TB24 Take 25 mg by mouth daily 2/20/19 pt reports Dr. Ian Shane told her to take 50 mg daily. Historical Provider, MD   levothyroxine (SYNTHROID) 75 MCG tablet Take 75 mcg by mouth    Historical Provider, MD   montelukast (SINGULAIR) 10 MG tablet Take 10 mg by mouth daily    Historical Provider, MD   lisinopril (PRINIVIL;ZESTRIL) 20 MG tablet Take 20 mg by mouth daily    Historical Provider, MD   metFORMIN (GLUCOPHAGE) 500 MG tablet Take 500 mg by mouth 2 times daily (with meals)    Historical Provider, MD            MSE:  Patient is  A & O x3. Appearance:  well-appearing appropriately dressed for season and age. Cognition:  Recent memory impaired , remote memory fair , fair fund of knowledge, average  intelligence level. Speech:  normal  Language: Naming: intact;  Word Finding: intact  Conversation no evidence of delusions  Behavior:  Cooperative and Good eye contact  Mood: happy  Affect: congruent with

## 2018-03-28 NOTE — PLAN OF CARE
Problem: Altered Mood, Manic Behavior:  Goal: Ability to demonstrate self-control will improve  Ability to demonstrate self-control will improve   Outcome: Ongoing                                                                      Group Therapy Note    Date: 3/28/2018  Start Time: 1115  End Time:  1215  Number of Participants: 9    Type of Group: Psychotherapy    Patient's Goal:  Hand Out: Fun Activities to do to [place] depression     Notes:  Pt was present for group. Pt participated and was able to identify things they do to help decrease depression. Pt offered feed back to other group members and participated in group discussion. The group offered support and encouragement. Status After Intervention:  Unchanged    Participation Level: Interactive and Attentive    Participation Quality: Appropriate, Attentive, Sharing and Supportive      Speech:  normal      Thought Process/Content: Logical  Linear      Affective Functioning: Congruent      Mood: anxious       Level of consciousness:  Alert, Oriented x4 and Attentive      Response to Learning: Able to verbalize current knowledge/experience, Able to verbalize/acknowledge new learning, Able to retain information, Capable of insight and Able to change behavior      Endings: None Reported    Modes of Intervention: Education, Support, Socialization, Exploration and Clarifying      Discipline Responsible: /Counselor      Signature:   Grant Lyles

## 2018-03-28 NOTE — PLAN OF CARE
Problem: Altered Mood, Manic Behavior:  Goal: Ability to interact with others will improve  Ability to interact with others will improve   Outcome: Ongoing                                                                      Group Therapy Note    Date: 3/28/2018  Start Time: 1000  End Time:  1100  Number of Participants: 10    Type of Group: Psychoeducation    Wellness Binder Information  Module Name:  Stress  Session Number:  5    Patient's Goal:  To improve knowledge of effective stress management techniques    Notes:  Pt participated in group discussion, identified sources and signs of stress, discussed effective stress management techniques.     Status After Intervention:  Unchanged    Participation Level: Interactive    Participation Quality: Attentive      Speech:  normal      Thought Process/Content: Logical      Affective Functioning: Congruent      Mood: anxious and depressed      Level of consciousness:  Alert and Oriented x4      Response to Learning: Able to verbalize current knowledge/experience, Able to verbalize/acknowledge new learning and Progressing to goal      Endings: None Reported    Modes of Intervention: Education      Discipline Responsible: Psychoeducational Specialist      Signature:  Kim Velazco

## 2018-03-29 ENCOUNTER — HOSPITAL ENCOUNTER (OUTPATIENT)
Dept: PSYCHIATRY | Age: 63
Setting detail: THERAPIES SERIES
Discharge: HOME | End: 2018-03-29
Payer: MEDICARE

## 2018-03-29 VITALS — SYSTOLIC BLOOD PRESSURE: 151 MMHG | RESPIRATION RATE: 18 BRPM | HEART RATE: 64 BPM | DIASTOLIC BLOOD PRESSURE: 90 MMHG

## 2018-03-29 PROCEDURE — 90853 GROUP PSYCHOTHERAPY: CPT

## 2018-03-29 NOTE — PLAN OF CARE
Problem: Altered Mood, Manic Behavior:  Goal: Ability to demonstrate self-control will improve  Ability to demonstrate self-control will improve   Outcome: Ongoing                                                                      Group Therapy Note    Date: 3/29/2018  Start Time: 1115  End Time:  1215  Number of Participants: 10    Type of Group: Cognitive Skills    Patient's Goal:  Stopping negative thoughts and replacing them with positive thought/gratitudes. Process emotions and actions in how to relate to others or their relationship with others. Notes:  Pt attended process group as schedule. Pt participated by identified an issue to work on today regarding how they interact and relate to others. Participated in group discussion. Pt gave support and encouragement to group members. Status After Intervention:  Improved    Participation Level:  Active Listener and Interactive    Participation Quality: Appropriate, Attentive, Sharing and Supportive      Speech:  normal      Thought Process/Content: Logical  Linear      Affective Functioning: Congruent      Mood: anxious and depressed      Level of consciousness:  Alert, Oriented x4 and Attentive      Response to Learning: Able to verbalize current knowledge/experience and Progressing to goal      Endings: None Reported    Modes of Intervention: Education, Support, Socialization, Exploration and Clarifying      Discipline Responsible: /Counselor      Signature:  JORDY Garza

## 2018-04-02 ENCOUNTER — ANESTHESIA EVENT (OUTPATIENT)
Dept: ENDOSCOPY | Age: 63
End: 2018-04-02
Payer: MEDICARE

## 2018-04-02 ENCOUNTER — HOSPITAL ENCOUNTER (OUTPATIENT)
Age: 63
Setting detail: OUTPATIENT SURGERY
Discharge: HOME | End: 2018-04-02
Attending: INTERNAL MEDICINE | Admitting: INTERNAL MEDICINE
Payer: MEDICARE

## 2018-04-02 ENCOUNTER — ANESTHESIA (OUTPATIENT)
Dept: ENDOSCOPY | Age: 63
End: 2018-04-02
Payer: MEDICARE

## 2018-04-02 VITALS
HEIGHT: 63 IN | RESPIRATION RATE: 18 BRPM | OXYGEN SATURATION: 99 % | BODY MASS INDEX: 33.66 KG/M2 | HEART RATE: 76 BPM | DIASTOLIC BLOOD PRESSURE: 70 MMHG | WEIGHT: 190 LBS | SYSTOLIC BLOOD PRESSURE: 137 MMHG | TEMPERATURE: 97.9 F

## 2018-04-02 VITALS
OXYGEN SATURATION: 98 % | SYSTOLIC BLOOD PRESSURE: 131 MMHG | DIASTOLIC BLOOD PRESSURE: 91 MMHG | RESPIRATION RATE: 15 BRPM

## 2018-04-02 LAB
GLUCOSE BLD-MCNC: 88 MG/DL (ref 70–99)
PERFORMED ON: NORMAL

## 2018-04-02 PROCEDURE — 2580000003 HC RX 258: Performed by: INTERNAL MEDICINE

## 2018-04-02 PROCEDURE — 6360000002 HC RX W HCPCS: Performed by: NURSE ANESTHETIST, CERTIFIED REGISTERED

## 2018-04-02 PROCEDURE — 2500000003 HC RX 250 WO HCPCS: Performed by: INTERNAL MEDICINE

## 2018-04-02 PROCEDURE — 88312 SPECIAL STAINS GROUP 1: CPT

## 2018-04-02 PROCEDURE — 2500000003 HC RX 250 WO HCPCS: Performed by: NURSE ANESTHETIST, CERTIFIED REGISTERED

## 2018-04-02 PROCEDURE — 43239 EGD BIOPSY SINGLE/MULTIPLE: CPT | Performed by: INTERNAL MEDICINE

## 2018-04-02 PROCEDURE — 7100000011 HC PHASE II RECOVERY - ADDTL 15 MIN: Performed by: INTERNAL MEDICINE

## 2018-04-02 PROCEDURE — 7100000010 HC PHASE II RECOVERY - FIRST 15 MIN: Performed by: INTERNAL MEDICINE

## 2018-04-02 PROCEDURE — 3700000000 HC ANESTHESIA ATTENDED CARE: Performed by: INTERNAL MEDICINE

## 2018-04-02 PROCEDURE — 43248 EGD GUIDE WIRE INSERTION: CPT | Performed by: INTERNAL MEDICINE

## 2018-04-02 PROCEDURE — 82948 REAGENT STRIP/BLOOD GLUCOSE: CPT

## 2018-04-02 PROCEDURE — 88305 TISSUE EXAM BY PATHOLOGIST: CPT

## 2018-04-02 PROCEDURE — 3609012400 HC EGD TRANSORAL BIOPSY SINGLE/MULTIPLE: Performed by: INTERNAL MEDICINE

## 2018-04-02 PROCEDURE — 3700000001 HC ADD 15 MINUTES (ANESTHESIA): Performed by: INTERNAL MEDICINE

## 2018-04-02 PROCEDURE — 3609012700 HC EGD DILATION SAVORY: Performed by: INTERNAL MEDICINE

## 2018-04-02 RX ORDER — LIDOCAINE HYDROCHLORIDE 10 MG/ML
INJECTION, SOLUTION EPIDURAL; INFILTRATION; INTRACAUDAL; PERINEURAL PRN
Status: DISCONTINUED | OUTPATIENT
Start: 2018-04-02 | End: 2018-04-02 | Stop reason: SDUPTHER

## 2018-04-02 RX ORDER — PROPOFOL 10 MG/ML
INJECTION, EMULSION INTRAVENOUS PRN
Status: DISCONTINUED | OUTPATIENT
Start: 2018-04-02 | End: 2018-04-02 | Stop reason: SDUPTHER

## 2018-04-02 RX ORDER — LIDOCAINE HYDROCHLORIDE 10 MG/ML
1 INJECTION, SOLUTION EPIDURAL; INFILTRATION; INTRACAUDAL; PERINEURAL ONCE
Status: COMPLETED | OUTPATIENT
Start: 2018-04-02 | End: 2018-04-02

## 2018-04-02 RX ORDER — SODIUM CHLORIDE, SODIUM LACTATE, POTASSIUM CHLORIDE, CALCIUM CHLORIDE 600; 310; 30; 20 MG/100ML; MG/100ML; MG/100ML; MG/100ML
INJECTION, SOLUTION INTRAVENOUS CONTINUOUS
Status: DISCONTINUED | OUTPATIENT
Start: 2018-04-02 | End: 2018-04-02 | Stop reason: HOSPADM

## 2018-04-02 RX ADMIN — LIDOCAINE HYDROCHLORIDE 1 ML: 10 INJECTION, SOLUTION EPIDURAL; INFILTRATION; INTRACAUDAL; PERINEURAL at 11:40

## 2018-04-02 RX ADMIN — PROPOFOL 120 MG: 10 INJECTION, EMULSION INTRAVENOUS at 12:15

## 2018-04-02 RX ADMIN — SODIUM CHLORIDE, SODIUM LACTATE, POTASSIUM CHLORIDE, AND CALCIUM CHLORIDE: 600; 310; 30; 20 INJECTION, SOLUTION INTRAVENOUS at 11:40

## 2018-04-02 RX ADMIN — SODIUM CHLORIDE, SODIUM LACTATE, POTASSIUM CHLORIDE, AND CALCIUM CHLORIDE: 600; 310; 30; 20 INJECTION, SOLUTION INTRAVENOUS at 12:09

## 2018-04-02 RX ADMIN — LIDOCAINE HYDROCHLORIDE 5 ML: 10 INJECTION, SOLUTION EPIDURAL; INFILTRATION; INTRACAUDAL; PERINEURAL at 12:15

## 2018-04-02 ASSESSMENT — PAIN - FUNCTIONAL ASSESSMENT: PAIN_FUNCTIONAL_ASSESSMENT: 0-10

## 2018-04-03 ENCOUNTER — HOSPITAL ENCOUNTER (OUTPATIENT)
Dept: PSYCHIATRY | Age: 63
Setting detail: THERAPIES SERIES
Discharge: HOME | End: 2018-04-03
Payer: MEDICARE

## 2018-04-03 PROCEDURE — 90853 GROUP PSYCHOTHERAPY: CPT

## 2018-04-03 PROCEDURE — 90853 GROUP PSYCHOTHERAPY: CPT | Performed by: SOCIAL WORKER

## 2018-04-04 ENCOUNTER — HOSPITAL ENCOUNTER (OUTPATIENT)
Dept: PSYCHIATRY | Age: 63
Setting detail: THERAPIES SERIES
Discharge: HOME | End: 2018-04-04
Payer: MEDICARE

## 2018-04-04 PROCEDURE — 90853 GROUP PSYCHOTHERAPY: CPT

## 2018-04-05 ENCOUNTER — HOSPITAL ENCOUNTER (OUTPATIENT)
Dept: PSYCHIATRY | Age: 63
Setting detail: THERAPIES SERIES
Discharge: HOME | End: 2018-04-05
Payer: MEDICARE

## 2018-04-05 PROCEDURE — 90853 GROUP PSYCHOTHERAPY: CPT

## 2018-04-09 ENCOUNTER — HOSPITAL ENCOUNTER (OUTPATIENT)
Dept: PSYCHIATRY | Age: 63
Setting detail: THERAPIES SERIES
Discharge: HOME | End: 2018-04-09
Payer: MEDICARE

## 2018-04-09 PROCEDURE — 90853 GROUP PSYCHOTHERAPY: CPT

## 2018-04-10 ENCOUNTER — HOSPITAL ENCOUNTER (OUTPATIENT)
Dept: PSYCHIATRY | Age: 63
Setting detail: THERAPIES SERIES
Discharge: HOME | End: 2018-04-10
Payer: MEDICARE

## 2018-04-10 PROCEDURE — 90853 GROUP PSYCHOTHERAPY: CPT

## 2018-04-11 ENCOUNTER — HOSPITAL ENCOUNTER (OUTPATIENT)
Dept: PSYCHIATRY | Age: 63
Setting detail: THERAPIES SERIES
Discharge: HOME | End: 2018-04-11
Payer: MEDICARE

## 2018-04-11 PROCEDURE — 90853 GROUP PSYCHOTHERAPY: CPT | Performed by: SOCIAL WORKER

## 2018-04-11 PROCEDURE — 90832 PSYTX W PT 30 MINUTES: CPT | Performed by: SOCIAL WORKER

## 2018-04-16 ENCOUNTER — HOSPITAL ENCOUNTER (OUTPATIENT)
Dept: PSYCHIATRY | Age: 63
Setting detail: THERAPIES SERIES
Discharge: HOME | End: 2018-04-16
Payer: MEDICARE

## 2018-04-16 PROCEDURE — 90853 GROUP PSYCHOTHERAPY: CPT | Performed by: SOCIAL WORKER

## 2018-04-17 ENCOUNTER — HOSPITAL ENCOUNTER (OUTPATIENT)
Dept: PSYCHIATRY | Age: 63
Setting detail: THERAPIES SERIES
Discharge: HOME | End: 2018-04-17
Payer: MEDICARE

## 2018-04-17 PROCEDURE — 90853 GROUP PSYCHOTHERAPY: CPT | Performed by: SOCIAL WORKER

## 2018-04-19 ENCOUNTER — HOSPITAL ENCOUNTER (OUTPATIENT)
Dept: PSYCHIATRY | Age: 63
Setting detail: THERAPIES SERIES
Discharge: HOME | End: 2018-04-19
Payer: MEDICARE

## 2018-04-19 PROCEDURE — 90853 GROUP PSYCHOTHERAPY: CPT

## 2018-04-25 ENCOUNTER — HOSPITAL ENCOUNTER (OUTPATIENT)
Dept: PSYCHIATRY | Age: 63
Setting detail: THERAPIES SERIES
Discharge: HOME | End: 2018-04-25
Payer: MEDICARE

## 2018-04-25 DIAGNOSIS — F31.9 BIPOLAR I DISORDER (HCC): ICD-10-CM

## 2018-04-25 PROCEDURE — 90853 GROUP PSYCHOTHERAPY: CPT | Performed by: SOCIAL WORKER

## 2018-04-25 RX ORDER — HYDROXYZINE HYDROCHLORIDE 25 MG/1
TABLET, FILM COATED ORAL
Qty: 90 TABLET | Refills: 0 | Status: SHIPPED | OUTPATIENT
Start: 2018-04-25 | End: 2018-05-23 | Stop reason: SDUPTHER

## 2018-04-25 RX ORDER — TRAZODONE HYDROCHLORIDE 50 MG/1
TABLET ORAL
Qty: 60 TABLET | Refills: 0 | Status: CANCELLED | OUTPATIENT
Start: 2018-04-25

## 2018-04-26 ENCOUNTER — HOSPITAL ENCOUNTER (OUTPATIENT)
Dept: PSYCHIATRY | Age: 63
Setting detail: THERAPIES SERIES
Discharge: HOME | End: 2018-04-26
Payer: MEDICARE

## 2018-04-26 PROCEDURE — 90853 GROUP PSYCHOTHERAPY: CPT | Performed by: SOCIAL WORKER

## 2018-04-26 RX ORDER — CLONAZEPAM 0.5 MG/1
TABLET ORAL
Qty: 60 TABLET | Refills: 0 | OUTPATIENT
Start: 2018-04-26 | End: 2018-04-26

## 2018-04-27 ENCOUNTER — TELEPHONE (OUTPATIENT)
Dept: PSYCHIATRY | Age: 63
End: 2018-04-27

## 2018-05-02 ENCOUNTER — HOSPITAL ENCOUNTER (OUTPATIENT)
Dept: PSYCHIATRY | Age: 63
Setting detail: THERAPIES SERIES
Discharge: HOME | End: 2018-05-02
Payer: MEDICARE

## 2018-05-02 PROCEDURE — 90853 GROUP PSYCHOTHERAPY: CPT | Performed by: SOCIAL WORKER

## 2018-05-03 ENCOUNTER — TELEPHONE (OUTPATIENT)
Dept: PSYCHIATRY | Age: 63
End: 2018-05-03

## 2018-05-04 ENCOUNTER — HOSPITAL ENCOUNTER (OUTPATIENT)
Dept: PSYCHIATRY | Age: 63
Setting detail: THERAPIES SERIES
Discharge: HOME | End: 2018-05-04
Payer: MEDICARE

## 2018-05-04 PROCEDURE — 90853 GROUP PSYCHOTHERAPY: CPT | Performed by: SOCIAL WORKER

## 2018-05-08 ENCOUNTER — HOSPITAL ENCOUNTER (OUTPATIENT)
Dept: PSYCHIATRY | Age: 63
Setting detail: THERAPIES SERIES
Discharge: HOME | End: 2018-05-08
Payer: MEDICARE

## 2018-05-08 PROCEDURE — 90853 GROUP PSYCHOTHERAPY: CPT | Performed by: SOCIAL WORKER

## 2018-05-09 ENCOUNTER — HOSPITAL ENCOUNTER (OUTPATIENT)
Dept: PSYCHIATRY | Age: 63
Setting detail: THERAPIES SERIES
Discharge: HOME | End: 2018-05-09
Payer: MEDICARE

## 2018-05-09 PROCEDURE — 90853 GROUP PSYCHOTHERAPY: CPT | Performed by: SOCIAL WORKER

## 2018-05-11 ENCOUNTER — HOSPITAL ENCOUNTER (OUTPATIENT)
Dept: PSYCHIATRY | Age: 63
Setting detail: THERAPIES SERIES
Discharge: HOME | End: 2018-05-11
Payer: MEDICARE

## 2018-05-11 ENCOUNTER — TELEPHONE (OUTPATIENT)
Dept: PSYCHIATRY | Age: 63
End: 2018-05-11

## 2018-05-11 PROCEDURE — 90853 GROUP PSYCHOTHERAPY: CPT | Performed by: SOCIAL WORKER

## 2018-05-16 ENCOUNTER — HOSPITAL ENCOUNTER (OUTPATIENT)
Dept: PSYCHIATRY | Age: 63
Setting detail: THERAPIES SERIES
Discharge: HOME | End: 2018-05-16
Payer: MEDICARE

## 2018-05-16 PROCEDURE — 90853 GROUP PSYCHOTHERAPY: CPT | Performed by: SOCIAL WORKER

## 2018-05-17 ENCOUNTER — HOSPITAL ENCOUNTER (OUTPATIENT)
Dept: PSYCHIATRY | Age: 63
Setting detail: THERAPIES SERIES
Discharge: HOME | End: 2018-05-17
Payer: MEDICARE

## 2018-05-17 VITALS
HEART RATE: 81 BPM | TEMPERATURE: 97.6 F | SYSTOLIC BLOOD PRESSURE: 124 MMHG | DIASTOLIC BLOOD PRESSURE: 77 MMHG | RESPIRATION RATE: 18 BRPM

## 2018-05-17 PROCEDURE — 90853 GROUP PSYCHOTHERAPY: CPT | Performed by: SOCIAL WORKER

## 2018-05-17 PROCEDURE — 90832 PSYTX W PT 30 MINUTES: CPT | Performed by: NURSE PRACTITIONER

## 2018-05-17 RX ORDER — ARIPIPRAZOLE 20 MG/1
20 TABLET ORAL DAILY
Qty: 30 TABLET | Refills: 0 | Status: SHIPPED | OUTPATIENT
Start: 2018-05-17 | End: 2018-05-23

## 2018-05-18 RX ORDER — CLONAZEPAM 0.5 MG/1
TABLET ORAL
Qty: 60 TABLET | Refills: 0 | Status: SHIPPED | OUTPATIENT
Start: 2018-05-18 | End: 2018-06-22 | Stop reason: ALTCHOICE

## 2018-05-21 RX ORDER — ESCITALOPRAM OXALATE 10 MG/1
10 TABLET ORAL DAILY
Qty: 30 TABLET | Refills: 1 | Status: SHIPPED | OUTPATIENT
Start: 2018-05-21 | End: 2018-09-12 | Stop reason: SDUPTHER

## 2018-05-23 ENCOUNTER — OFFICE VISIT (OUTPATIENT)
Dept: GASTROENTEROLOGY | Age: 63
End: 2018-05-23
Payer: MEDICARE

## 2018-05-23 VITALS
WEIGHT: 199.6 LBS | HEART RATE: 77 BPM | SYSTOLIC BLOOD PRESSURE: 108 MMHG | OXYGEN SATURATION: 97 % | HEIGHT: 63 IN | DIASTOLIC BLOOD PRESSURE: 70 MMHG | BODY MASS INDEX: 35.37 KG/M2

## 2018-05-23 DIAGNOSIS — Z87.19 HISTORY OF ESOPHAGEAL STRICTURE: ICD-10-CM

## 2018-05-23 DIAGNOSIS — Z79.1 NSAID LONG-TERM USE: ICD-10-CM

## 2018-05-23 DIAGNOSIS — Z87.19 S/P DILATATION OF ESOPHAGEAL STRICTURE: ICD-10-CM

## 2018-05-23 DIAGNOSIS — K31.9 GASTROPATHY: Primary | ICD-10-CM

## 2018-05-23 DIAGNOSIS — Z98.890 S/P DILATATION OF ESOPHAGEAL STRICTURE: ICD-10-CM

## 2018-05-23 DIAGNOSIS — F31.9 BIPOLAR I DISORDER (HCC): ICD-10-CM

## 2018-05-23 DIAGNOSIS — K21.9 CHRONIC GERD: ICD-10-CM

## 2018-05-23 PROCEDURE — 1036F TOBACCO NON-USER: CPT | Performed by: NURSE PRACTITIONER

## 2018-05-23 PROCEDURE — 3017F COLORECTAL CA SCREEN DOC REV: CPT | Performed by: NURSE PRACTITIONER

## 2018-05-23 PROCEDURE — G8417 CALC BMI ABV UP PARAM F/U: HCPCS | Performed by: NURSE PRACTITIONER

## 2018-05-23 PROCEDURE — 99213 OFFICE O/P EST LOW 20 MIN: CPT | Performed by: NURSE PRACTITIONER

## 2018-05-23 PROCEDURE — G8427 DOCREV CUR MEDS BY ELIG CLIN: HCPCS | Performed by: NURSE PRACTITIONER

## 2018-05-23 RX ORDER — OMEPRAZOLE 20 MG/1
20 CAPSULE, DELAYED RELEASE ORAL
Qty: 60 CAPSULE | Refills: 2 | Status: SHIPPED | OUTPATIENT
Start: 2018-05-23 | End: 2018-07-16 | Stop reason: DRUGHIGH

## 2018-05-23 RX ORDER — HYDROXYZINE HYDROCHLORIDE 25 MG/1
TABLET, FILM COATED ORAL
Qty: 90 TABLET | Refills: 0 | Status: SHIPPED | OUTPATIENT
Start: 2018-05-23 | End: 2018-06-18 | Stop reason: SDUPTHER

## 2018-05-23 RX ORDER — TRIHEXYPHENIDYL HYDROCHLORIDE 5 MG/1
TABLET ORAL
Qty: 90 TABLET | Refills: 3 | Status: SHIPPED | OUTPATIENT
Start: 2018-05-23 | End: 2018-11-27 | Stop reason: SDUPTHER

## 2018-05-23 ASSESSMENT — ENCOUNTER SYMPTOMS
SHORTNESS OF BREATH: 0
NAUSEA: 0
RECTAL PAIN: 0
VOICE CHANGE: 0
SORE THROAT: 0
BACK PAIN: 1
DIARRHEA: 0
ABDOMINAL DISTENTION: 0
VOMITING: 0
COUGH: 0
TROUBLE SWALLOWING: 0
BLOOD IN STOOL: 0
CHEST TIGHTNESS: 0
ABDOMINAL PAIN: 0
CONSTIPATION: 0

## 2018-06-20 RX ORDER — HYDROXYZINE HYDROCHLORIDE 25 MG/1
TABLET, FILM COATED ORAL
Qty: 90 TABLET | Refills: 0 | Status: SHIPPED | OUTPATIENT
Start: 2018-06-20 | End: 2018-07-24 | Stop reason: SDUPTHER

## 2018-06-22 ENCOUNTER — OFFICE VISIT (OUTPATIENT)
Dept: PSYCHIATRY | Age: 63
End: 2018-06-22
Payer: MEDICARE

## 2018-06-22 VITALS
HEART RATE: 82 BPM | WEIGHT: 197.1 LBS | SYSTOLIC BLOOD PRESSURE: 113 MMHG | BODY MASS INDEX: 34.92 KG/M2 | DIASTOLIC BLOOD PRESSURE: 80 MMHG | HEIGHT: 63 IN | OXYGEN SATURATION: 98 %

## 2018-06-22 DIAGNOSIS — F31.9 BIPOLAR I DISORDER (HCC): Primary | ICD-10-CM

## 2018-06-22 PROCEDURE — 3017F COLORECTAL CA SCREEN DOC REV: CPT | Performed by: NURSE PRACTITIONER

## 2018-06-22 PROCEDURE — G8427 DOCREV CUR MEDS BY ELIG CLIN: HCPCS | Performed by: NURSE PRACTITIONER

## 2018-06-22 PROCEDURE — 99213 OFFICE O/P EST LOW 20 MIN: CPT | Performed by: NURSE PRACTITIONER

## 2018-06-22 PROCEDURE — 1036F TOBACCO NON-USER: CPT | Performed by: NURSE PRACTITIONER

## 2018-06-22 PROCEDURE — G8417 CALC BMI ABV UP PARAM F/U: HCPCS | Performed by: NURSE PRACTITIONER

## 2018-06-28 DIAGNOSIS — F31.9 BIPOLAR I DISORDER (HCC): ICD-10-CM

## 2018-06-28 RX ORDER — OXCARBAZEPINE 300 MG/1
TABLET, FILM COATED ORAL
Qty: 60 TABLET | Refills: 1 | Status: SHIPPED | OUTPATIENT
Start: 2018-06-28 | End: 2018-10-23 | Stop reason: SDUPTHER

## 2018-06-28 RX ORDER — TRAZODONE HYDROCHLORIDE 50 MG/1
TABLET ORAL
Qty: 45 TABLET | Refills: 0 | Status: SHIPPED | OUTPATIENT
Start: 2018-06-28 | End: 2018-07-24 | Stop reason: SDUPTHER

## 2018-07-24 DIAGNOSIS — F31.9 BIPOLAR I DISORDER (HCC): ICD-10-CM

## 2018-07-24 RX ORDER — TRAZODONE HYDROCHLORIDE 50 MG/1
TABLET ORAL
Qty: 45 TABLET | Refills: 0 | Status: SHIPPED | OUTPATIENT
Start: 2018-07-24 | End: 2018-08-22 | Stop reason: SDUPTHER

## 2018-07-24 RX ORDER — HYDROXYZINE HYDROCHLORIDE 25 MG/1
TABLET, FILM COATED ORAL
Qty: 90 TABLET | Refills: 0 | Status: SHIPPED | OUTPATIENT
Start: 2018-07-24 | End: 2018-08-22 | Stop reason: SDUPTHER

## 2018-07-24 NOTE — TELEPHONE ENCOUNTER
tablets at bedtime prn insomnia. 6/28/18   BETY Huynh   OXcarbazepine (TRILEPTAL) 300 MG tablet Take one tablet twice a day. 6/28/18   BETY Huynh   hydrOXYzine (ATARAX) 25 MG tablet TAKE 1 TABLET BY MOUTH THREE TIMES A DAY AS NEEDED FOR ANXIETY 6/20/18   BETY Huynh   trihexyphenidyl (ARTANE) 5 MG tablet TAKE 1 TABLET BY MOUTH THREE TIMES DAILY 5/23/18   BETY Huynh   escitalopram (LEXAPRO) 10 MG tablet Take 1 tablet by mouth daily 5/21/18   BETY Huynh   ARIPiprazole (ABILIFY) 20 MG tablet Take one tablet daily 2/27/18   BETY Alas   Melatonin 10 MG TABS Take 10 mg by mouth nightly 2/27/18   BETY Alas   fluticasone (FLOVENT HFA) 110 MCG/ACT inhaler Inhale 1 puff into the lungs 2 times daily    Historical Provider, MD   Fluticasone Propionate (FLONASE NA) by Nasal route daily as needed     Historical Provider, MD   gabapentin (NEURONTIN) 600 MG tablet Take 600 mg by mouth 2 times daily     Historical Provider, MD   Multiple Vitamins-Minerals (THERAPEUTIC MULTIVITAMIN-MINERALS) tablet Take 1 tablet by mouth daily    Historical Provider, MD   SUMAtriptan (IMITREX) 100 MG tablet Take 100 mg by mouth once as needed for Migraine (MAY REPEAT IN 2 HRS.)    Historical Provider, MD   Mirabegron ER (MYRBETRIQ) 25 MG TB24 Take 25 mg by mouth daily 2/20/19 pt reports Dr. Goyo Luong told her to take 50 mg daily. Historical Provider, MD   levothyroxine (SYNTHROID) 75 MCG tablet Take 75 mcg by mouth    Historical Provider, MD   lisinopril (PRINIVIL;ZESTRIL) 20 MG tablet Take 20 mg by mouth daily    Historical Provider, MD   metFORMIN (GLUCOPHAGE) 500 MG tablet Take 500 mg by mouth 2 times daily (with meals)    Historical Provider, MD     MSE:  Patient is  A & O x3. Appearance:  well-appearing appropriately dressed for season and age. Cognition:  Recent memory intact , remote memory intact , good fund of knowledge, average  intelligence level.    Speech:  normal  Language: Naming: intact; Word Finding: intact  Conversation no evidence of delusions  Behavior:  Cooperative and Good eye contact  Mood: happy  Affect: congruent with mood  Thought Content: no evidence of overt psychosis, delusional thought or suicidal /homicidal ideation or plan  Thought Process: linear, goal directed and coherent  Judgement Insight:   Fair  Gait and Station:normal gait and station   Musculoskeletal: WNL      Assesment: See above   [facility] report reviewed per  req. Plan: Continue Hydroxyzine 25 mg tid prn anxiety, Artane 5 mg one tid, Lexapro 10 mg daily,  Trileptal 300 mg bid, Abilify 20 mg qhs, Melatonin 10 mg qhs, Trazodone 50 mg  1 1/2 tablets qhs prn insomnia     1. The risks, benefits, side effects, indications,  contraindications, and adverse effects of the medications have been discussed. yes   2. The pt has verbalized understanding and has capacity to give informed consent. 3. The [facility] report has been reviewed according to [facility] regulations. 4. Supportive therapy offered. 5. Return in six weeks  6. The patient has been advised to call with any problems. 7. Controlled substance Treatment Plan: No Rx     Electronically signed by Elmer Disla MA on 7/24/2018 at 11:16 AM          Requested Prescriptions     Pending Prescriptions Disp Refills    hydrOXYzine (ATARAX) 25 MG tablet 90 tablet 0    traZODone (DESYREL) 50 MG tablet 45 tablet 0     Sig: Take 1 and 1/2  tablets at bedtime prn insomnia.

## 2018-08-03 ENCOUNTER — OFFICE VISIT (OUTPATIENT)
Dept: PSYCHIATRY | Age: 63
End: 2018-08-03
Payer: MEDICARE

## 2018-08-03 VITALS
WEIGHT: 196 LBS | OXYGEN SATURATION: 98 % | HEART RATE: 58 BPM | BODY MASS INDEX: 34.73 KG/M2 | SYSTOLIC BLOOD PRESSURE: 133 MMHG | DIASTOLIC BLOOD PRESSURE: 71 MMHG | HEIGHT: 63 IN

## 2018-08-03 DIAGNOSIS — Z79.899 HIGH RISK MEDICATION USE: ICD-10-CM

## 2018-08-03 DIAGNOSIS — F31.9 BIPOLAR I DISORDER (HCC): Primary | ICD-10-CM

## 2018-08-03 PROCEDURE — 1036F TOBACCO NON-USER: CPT | Performed by: NURSE PRACTITIONER

## 2018-08-03 PROCEDURE — G8417 CALC BMI ABV UP PARAM F/U: HCPCS | Performed by: NURSE PRACTITIONER

## 2018-08-03 PROCEDURE — G8427 DOCREV CUR MEDS BY ELIG CLIN: HCPCS | Performed by: NURSE PRACTITIONER

## 2018-08-03 PROCEDURE — 3017F COLORECTAL CA SCREEN DOC REV: CPT | Performed by: NURSE PRACTITIONER

## 2018-08-03 PROCEDURE — 99214 OFFICE O/P EST MOD 30 MIN: CPT | Performed by: NURSE PRACTITIONER

## 2018-08-15 DIAGNOSIS — F31.9 BIPOLAR I DISORDER (HCC): ICD-10-CM

## 2018-08-15 RX ORDER — ARIPIPRAZOLE 20 MG/1
TABLET ORAL
Qty: 30 TABLET | Refills: 0 | Status: SHIPPED | OUTPATIENT
Start: 2018-08-15 | End: 2018-09-12 | Stop reason: SDUPTHER
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
## 2018-08-22 DIAGNOSIS — F31.9 BIPOLAR I DISORDER (HCC): ICD-10-CM

## 2018-08-22 RX ORDER — HYDROXYZINE HYDROCHLORIDE 25 MG/1
TABLET, FILM COATED ORAL
Qty: 90 TABLET | Refills: 0 | Status: SHIPPED | OUTPATIENT
Start: 2018-08-22 | End: 2018-10-09 | Stop reason: SDUPTHER

## 2018-08-22 RX ORDER — HYDROXYZINE HYDROCHLORIDE 25 MG/1
TABLET, FILM COATED ORAL
Qty: 90 TABLET | Refills: 0 | Status: CANCELLED | OUTPATIENT
Start: 2018-08-22

## 2018-08-22 RX ORDER — TRAZODONE HYDROCHLORIDE 50 MG/1
TABLET ORAL
Qty: 45 TABLET | Refills: 0 | Status: SHIPPED | OUTPATIENT
Start: 2018-08-22 | End: 2018-11-13 | Stop reason: DRUGHIGH

## 2018-08-22 NOTE — TELEPHONE ENCOUNTER
DAY AS NEEDED FOR ANXIETY 7/24/18   BETY eBrnard - NP   traZODone (DESYREL) 50 MG tablet Take 1 and 1/2  tablets at bedtime prn insomnia. 7/24/18   BETY Bernard - NP   omeprazole (PRILOSEC) 40 MG delayed release capsule TAKE 1 CAPSULE BY MOUTH DAILY 7/16/18   BETY Jacques   OXcarbazepine (TRILEPTAL) 300 MG tablet Take one tablet twice a day. 6/28/18   BETY Schulz   trihexyphenidyl (ARTANE) 5 MG tablet TAKE 1 TABLET BY MOUTH THREE TIMES DAILY 5/23/18   Derrill EastBETY   escitalopram (LEXAPRO) 10 MG tablet Take 1 tablet by mouth daily 5/21/18   Derrill East, BETY   Melatonin 10 MG TABS Take 10 mg by mouth nightly 2/27/18   Al Spray, BETY   fluticasone (FLOVENT HFA) 110 MCG/ACT inhaler Inhale 1 puff into the lungs 2 times daily    Historical Provider, MD   Fluticasone Propionate (FLONASE NA) by Nasal route daily as needed     Historical Provider, MD   gabapentin (NEURONTIN) 600 MG tablet Take 600 mg by mouth 2 times daily     Historical Provider, MD   Multiple Vitamins-Minerals (THERAPEUTIC MULTIVITAMIN-MINERALS) tablet Take 1 tablet by mouth daily    Historical Provider, MD   SUMAtriptan (IMITREX) 100 MG tablet Take 100 mg by mouth once as needed for Migraine (MAY REPEAT IN 2 HRS.)    Historical Provider, MD   Mirabegron ER (MYRBETRIQ) 25 MG TB24 Take 25 mg by mouth daily 2/20/19 pt reports Dr. Lyndsey Martinez told her to take 50 mg daily. Historical Provider, MD   levothyroxine (SYNTHROID) 75 MCG tablet Take 75 mcg by mouth    Historical Provider, MD   lisinopril (PRINIVIL;ZESTRIL) 20 MG tablet Take 20 mg by mouth daily    Historical Provider, MD   metFORMIN (GLUCOPHAGE) 500 MG tablet Take 500 mg by mouth 2 times daily (with meals)    Historical Provider, MD           MSE:  Patient is  A & O x3. Appearance:  well-appearing, good grooming and good hygiene appropriately dressed for season and age.   Cognition:  Recent memory fair , remote memory fair , good fund of knowledge, average  intelligence level. Speech:  normal and slightly pressured  Language: Naming: fair; Word Finding: fair  Conversation no evidence of delusions  Behavior:  Cooperative and Good eye contact  Mood: depressed and anxious  Affect: congruent with mood  Thought Content: no evidence of overt psychosis, delusional thought or suicidal /homicidal ideation or plan  Thought Process: linear, goal directed and coherent  Judgement Insight:  normal and appropriate  Gait and Station:normal gait and station and normal balance   Musculoskeletal: WNL      Assesment:   No diagnosis found. Plan:  Continue Hydroxyzine 25 mg tid prn anxiety (may take 50 mg if necessary), Artane 5 mg one tid, Lexapro 10 mg daily,  Trileptal 300 mg bid, Abilify 20 mg qhs, Melatonin 10 mg qhs, Increase trazodone 100 mg  1 1/2 tablets qhs prn insomnia   1. The risks, benefits, side effects, indications, contraindications, and adverse effects of the medications have been discussed. Yes.  2. The pt has verbalized understanding and has capacity to give informed consent. 3. The Anjali Sa report has been reviewed according to [facility] regulations. 4. Supportive therapy offered. 5. Follow up: No Follow-up on file. 6. The patient has been advised to call with any problems. 7. Controlled substance Treatment Plan. 8. The above listed medications have been continued, modifications in meds and other orders/labs as follows: No orders of the defined types were placed in this encounter. No orders of the defined types were placed in this encounter.       9. Additional comments:      Marissa Farooq [id number]  Requested Prescriptions     Pending Prescriptions Disp Refills    hydrOXYzine (ATARAX) 25 MG tablet [Pharmacy Med Name: HYDROXYZINE HCL 25 MG TAB 25 TAB] 90 tablet 0     Sig: TAKE 1 TABLET BY MOUTH THREE TIMES A DAY AS NEEDED FOR ANXIETY

## 2018-08-22 NOTE — TELEPHONE ENCOUNTER
Patient called and requested Atarax on both officelines. Which a encounter was put in by both MA's. The only differ between the two is , request of trazadone.
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
## 2018-09-12 DIAGNOSIS — F31.9 BIPOLAR I DISORDER (HCC): ICD-10-CM

## 2018-09-12 RX ORDER — ARIPIPRAZOLE 20 MG/1
TABLET ORAL
Qty: 30 TABLET | Refills: 0 | Status: SHIPPED | OUTPATIENT
Start: 2018-09-12 | End: 2018-10-09 | Stop reason: SDUPTHER

## 2018-09-12 RX ORDER — ESCITALOPRAM OXALATE 10 MG/1
10 TABLET ORAL DAILY
Qty: 30 TABLET | Refills: 1 | Status: SHIPPED | OUTPATIENT
Start: 2018-09-12 | End: 2018-11-05 | Stop reason: SDUPTHER
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
## 2018-09-26 PROBLEM — E86.0 DEHYDRATION: Status: RESOLVED | Noted: 2017-02-28 | Resolved: 2018-09-26

## 2018-10-09 DIAGNOSIS — F31.9 BIPOLAR I DISORDER (HCC): ICD-10-CM

## 2018-10-09 RX ORDER — HYDROXYZINE HYDROCHLORIDE 25 MG/1
TABLET, FILM COATED ORAL
Qty: 90 TABLET | Refills: 0 | OUTPATIENT
Start: 2018-10-09

## 2018-10-09 RX ORDER — HYDROXYZINE HYDROCHLORIDE 25 MG/1
TABLET, FILM COATED ORAL
Qty: 60 TABLET | Refills: 1 | Status: SHIPPED | OUTPATIENT
Start: 2018-10-09 | End: 2018-11-13 | Stop reason: SDUPTHER

## 2018-10-09 RX ORDER — ARIPIPRAZOLE 20 MG/1
TABLET ORAL
Qty: 30 TABLET | Refills: 0 | Status: SHIPPED | OUTPATIENT
Start: 2018-10-09 | End: 2018-11-05 | Stop reason: SDUPTHER

## 2018-10-09 RX ORDER — ARIPIPRAZOLE 20 MG/1
TABLET ORAL
Qty: 30 TABLET | Refills: 0 | OUTPATIENT
Start: 2018-10-09

## 2018-10-09 NOTE — TELEPHONE ENCOUNTER
Corrected Rx to reflect change. Please approve in chart.
REcommend reducing the hydroxyzine to 25mg bid. Pt reports memory issues and hydroxyzine can increase memory problems. Hydroxyzine 25mg bid prn # 60 and one refill. Refill the Abilify.
Historical Provider, MD           MSE:  Patient is  A & O x3. Appearance:  well-appearing, good grooming and good hygiene appropriately dressed for season and age. Cognition:  Recent memory fair , remote memory fair , good fund of knowledge, average  intelligence level. Speech:  normal and slightly pressured  Language: Naming: fair; Word Finding: fair  Conversation no evidence of delusions  Behavior:  Cooperative and Good eye contact  Mood: depressed and anxious  Affect: congruent with mood  Thought Content: no evidence of overt psychosis, delusional thought or suicidal /homicidal ideation or plan  Thought Process: linear, goal directed and coherent  Judgement Insight:  normal and appropriate  Gait and Station:normal gait and station and normal balance   Musculoskeletal: WNL      Assesment:   1. Bipolar I disorder ([facility] [address]        Plan:  Continue Hydroxyzine 25 mg tid prn anxiety (may take 50 mg if necessary), Artane 5 mg one tid, Lexapro 10 mg daily,  Trileptal 300 mg bid, Abilify 20 mg qhs, Melatonin 10 mg qhs, Increase trazodone 100 mg  1 1/2 tablets qhs prn insomnia   1. The risks, benefits, side effects, indications, contraindications, and adverse effects of the medications have been discussed. Yes.  2. The pt has verbalized understanding and has capacity to give informed consent. 3. The Edwin Patiño report has been reviewed according to [facility] regulations. 4. Supportive therapy offered. 5. Follow up: No Follow-up on file. 6. The patient has been advised to call with any problems. 7. Controlled substance Treatment Plan. 8. The above listed medications have been continued, modifications in meds and other orders/labs as follows: No orders of the defined types were placed in this encounter. No orders of the defined types were placed in this encounter.       9. Additional comments:      Amirah Thomas, NILSON-BC

## 2018-10-23 DIAGNOSIS — F31.9 BIPOLAR I DISORDER (HCC): ICD-10-CM
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
## 2018-10-25 RX ORDER — OXCARBAZEPINE 300 MG/1
TABLET, FILM COATED ORAL
Qty: 60 TABLET | Refills: 1 | Status: SHIPPED | OUTPATIENT
Start: 2018-10-25 | End: 2018-11-13 | Stop reason: SDUPTHER

## 2018-11-05 DIAGNOSIS — F31.9 BIPOLAR I DISORDER (HCC): ICD-10-CM

## 2018-11-06 RX ORDER — ESCITALOPRAM OXALATE 10 MG/1
10 TABLET ORAL DAILY
Qty: 30 TABLET | Refills: 1 | Status: SHIPPED | OUTPATIENT
Start: 2018-11-06 | End: 2018-11-13 | Stop reason: SDUPTHER

## 2018-11-06 RX ORDER — ARIPIPRAZOLE 20 MG/1
TABLET ORAL
Qty: 30 TABLET | Refills: 0 | Status: SHIPPED | OUTPATIENT
Start: 2018-11-06 | End: 2018-11-13 | Stop reason: SDUPTHER

## 2018-11-13 ENCOUNTER — OFFICE VISIT (OUTPATIENT)
Dept: PSYCHIATRY | Age: 63
End: 2018-11-13
Payer: MEDICARE

## 2018-11-13 DIAGNOSIS — F31.9 BIPOLAR I DISORDER (HCC): ICD-10-CM

## 2018-11-13 DIAGNOSIS — R41.3 MEMORY LOSS: Primary | ICD-10-CM

## 2018-11-13 PROCEDURE — G8484 FLU IMMUNIZE NO ADMIN: HCPCS | Performed by: NURSE PRACTITIONER

## 2018-11-13 PROCEDURE — G8417 CALC BMI ABV UP PARAM F/U: HCPCS | Performed by: NURSE PRACTITIONER

## 2018-11-13 PROCEDURE — 1036F TOBACCO NON-USER: CPT | Performed by: NURSE PRACTITIONER

## 2018-11-13 PROCEDURE — G8428 CUR MEDS NOT DOCUMENT: HCPCS | Performed by: NURSE PRACTITIONER

## 2018-11-13 PROCEDURE — 99214 OFFICE O/P EST MOD 30 MIN: CPT | Performed by: NURSE PRACTITIONER

## 2018-11-13 PROCEDURE — 3017F COLORECTAL CA SCREEN DOC REV: CPT | Performed by: NURSE PRACTITIONER

## 2018-11-13 RX ORDER — OXCARBAZEPINE 300 MG/1
TABLET, FILM COATED ORAL
Qty: 60 TABLET | Refills: 3 | Status: SHIPPED | OUTPATIENT
Start: 2018-11-13 | End: 2019-02-12 | Stop reason: SDUPTHER

## 2018-11-13 RX ORDER — TRAZODONE HYDROCHLORIDE 50 MG/1
TABLET ORAL
Qty: 60 TABLET | Refills: 5 | Status: SHIPPED | OUTPATIENT
Start: 2018-11-13 | End: 2019-02-12 | Stop reason: ALTCHOICE

## 2018-11-13 RX ORDER — ESCITALOPRAM OXALATE 10 MG/1
10 TABLET ORAL DAILY
Qty: 30 TABLET | Refills: 3 | Status: SHIPPED | OUTPATIENT
Start: 2018-11-13 | End: 2019-02-12 | Stop reason: SDUPTHER

## 2018-11-13 RX ORDER — HYDROXYZINE HYDROCHLORIDE 25 MG/1
TABLET, FILM COATED ORAL
Qty: 60 TABLET | Refills: 3 | Status: SHIPPED | OUTPATIENT
Start: 2018-11-13 | End: 2019-02-12 | Stop reason: SDUPTHER

## 2018-11-13 RX ORDER — ARIPIPRAZOLE 20 MG/1
TABLET ORAL
Qty: 30 TABLET | Refills: 0 | Status: SHIPPED | OUTPATIENT
Start: 2018-11-13 | End: 2019-01-02 | Stop reason: SDUPTHER

## 2018-11-13 NOTE — PROGRESS NOTES
11/13/2018 11:04 AM   Progress Note      Latanya Funk 1955  Psychotherapy Time Spent: 24 min      Psychotherapy Topics: family, health and medication management    Chief Complaint   Patient presents with    Anxiety    Insomnia         Subjective:  Patient is a 56 yo CF diagnosed with Bipolar D/O and presents today for follow-up. Last seen in clinic on 8/3/18 and prior records were reviewed. Patient is accompanied by , Ramon Galvan. Today patient states, \"I'm mostly improved. I've been somewhat depressed. It started about 3 months ago. I think the only thing that keeps me going is my neighbor who has had a couple of strokes. I've been cooking for him and taking him to the doctor. \" Denies SI. Complains of anergia, anhedonia, lack of concentration, and short term memory loss. She also complains of some long term memory loss. Blood sugars have been running consistently. Patient continues to attend [address] [place] [address]. She inquires about taking trazodone for sleep. She also mentions she has been out of vistaril. Biggest complaint is \"erratic sleep. \"     Patient reports side effects as follows: none. No evidence of EPS, no cogwheeling or abnormal motor movements. Absent  suicidal ideation. Reports compliance with medications as good . Review of Systems - 12 point review negative except anxiety  History obtained via chart review and patient  PCP is Francy Urena, DO      Current Meds:    Prior to Admission medications    Medication Sig Start Date End Date Taking? Authorizing Provider   traZODone (DESYREL) 50 MG tablet Take 1-2 tablets nightly as needed for sleep 11/13/18  Yes BETY Cardoza   hydrOXYzine (ATARAX) 25 MG tablet Take 1 tablet by mouth two time daily as needed for anxiety 11/13/18  Yes BETY Cardoza   OXcarbazepine (TRILEPTAL) 300 MG tablet Take one tablet twice a day.  11/13/18  Yes BETY Cardoza   escitalopram (LEXAPRO) 10 MG tablet Take 1 tablet by mouth

## 2018-11-27 DIAGNOSIS — F31.9 BIPOLAR I DISORDER (HCC): ICD-10-CM

## 2018-11-27 RX ORDER — TRIHEXYPHENIDYL HYDROCHLORIDE 5 MG/1
TABLET ORAL
Qty: 90 TABLET | Refills: 3 | Status: SHIPPED | OUTPATIENT
Start: 2018-11-27 | End: 2019-02-12 | Stop reason: SDUPTHER

## 2019-01-02 DIAGNOSIS — F31.9 BIPOLAR I DISORDER (HCC): ICD-10-CM

## 2019-01-02 RX ORDER — ARIPIPRAZOLE 20 MG/1
TABLET ORAL
Qty: 30 TABLET | Refills: 0 | Status: SHIPPED | OUTPATIENT
Start: 2019-01-02 | End: 2019-01-29 | Stop reason: SDUPTHER

## 2019-01-22 PROBLEM — R05.9 COUGH: Status: ACTIVE | Noted: 2019-01-22

## 2019-01-23 PROBLEM — F31.12 BIPOLAR 1 DISORDER, MANIC, MODERATE (HCC): Status: ACTIVE | Noted: 2019-01-23

## 2019-01-23 PROBLEM — I10 ESSENTIAL HYPERTENSION: Status: ACTIVE | Noted: 2019-01-23

## 2019-01-23 PROBLEM — R09.82 POSTNASAL DRIP: Status: ACTIVE | Noted: 2019-01-23

## 2019-01-23 PROBLEM — E11.9 TYPE 2 DIABETES MELLITUS WITHOUT COMPLICATION, WITHOUT LONG-TERM CURRENT USE OF INSULIN (HCC): Status: ACTIVE | Noted: 2019-01-23

## 2019-01-23 PROBLEM — E07.9 THYROID DISEASE: Status: ACTIVE | Noted: 2019-01-23

## 2019-01-23 PROBLEM — J45.40 MODERATE PERSISTENT ASTHMA WITHOUT COMPLICATION: Status: ACTIVE | Noted: 2019-01-23

## 2019-01-23 PROBLEM — J32.4 CHRONIC PANSINUSITIS: Status: ACTIVE | Noted: 2019-01-23

## 2019-01-23 PROBLEM — K21.9 GASTROESOPHAGEAL REFLUX DISEASE WITHOUT ESOPHAGITIS: Status: ACTIVE | Noted: 2019-01-23

## 2019-01-29 DIAGNOSIS — F31.9 BIPOLAR I DISORDER (HCC): ICD-10-CM

## 2019-01-29 RX ORDER — ARIPIPRAZOLE 20 MG/1
TABLET ORAL
Qty: 30 TABLET | Refills: 0 | Status: SHIPPED | OUTPATIENT
Start: 2019-01-29 | End: 2019-02-12 | Stop reason: SDUPTHER

## 2019-02-12 ENCOUNTER — OFFICE VISIT (OUTPATIENT)
Dept: PSYCHIATRY | Age: 64
End: 2019-02-12
Payer: MEDICARE

## 2019-02-12 DIAGNOSIS — G47.9 SLEEP DISTURBANCE: ICD-10-CM

## 2019-02-12 DIAGNOSIS — F41.1 GAD (GENERALIZED ANXIETY DISORDER): ICD-10-CM

## 2019-02-12 DIAGNOSIS — R41.3 MEMORY LOSS: ICD-10-CM

## 2019-02-12 DIAGNOSIS — F41.8 ANXIETY ABOUT HEALTH: ICD-10-CM

## 2019-02-12 DIAGNOSIS — F31.9 BIPOLAR I DISORDER (HCC): Primary | ICD-10-CM

## 2019-02-12 DIAGNOSIS — Z79.899 HIGH RISK MEDICATION USE: ICD-10-CM

## 2019-02-12 PROCEDURE — G8417 CALC BMI ABV UP PARAM F/U: HCPCS | Performed by: NURSE PRACTITIONER

## 2019-02-12 PROCEDURE — 1123F ACP DISCUSS/DSCN MKR DOCD: CPT | Performed by: NURSE PRACTITIONER

## 2019-02-12 PROCEDURE — 1090F PRES/ABSN URINE INCON ASSESS: CPT | Performed by: NURSE PRACTITIONER

## 2019-02-12 PROCEDURE — 4040F PNEUMOC VAC/ADMIN/RCVD: CPT | Performed by: NURSE PRACTITIONER

## 2019-02-12 PROCEDURE — 1036F TOBACCO NON-USER: CPT | Performed by: NURSE PRACTITIONER

## 2019-02-12 PROCEDURE — 3017F COLORECTAL CA SCREEN DOC REV: CPT | Performed by: NURSE PRACTITIONER

## 2019-02-12 PROCEDURE — G8484 FLU IMMUNIZE NO ADMIN: HCPCS | Performed by: NURSE PRACTITIONER

## 2019-02-12 PROCEDURE — 1101F PT FALLS ASSESS-DOCD LE1/YR: CPT | Performed by: NURSE PRACTITIONER

## 2019-02-12 PROCEDURE — G8428 CUR MEDS NOT DOCUMENT: HCPCS | Performed by: NURSE PRACTITIONER

## 2019-02-12 PROCEDURE — G8400 PT W/DXA NO RESULTS DOC: HCPCS | Performed by: NURSE PRACTITIONER

## 2019-02-12 PROCEDURE — 99214 OFFICE O/P EST MOD 30 MIN: CPT | Performed by: NURSE PRACTITIONER

## 2019-02-12 RX ORDER — MIRTAZAPINE 7.5 MG/1
7.5 TABLET, FILM COATED ORAL NIGHTLY
Qty: 30 TABLET | Refills: 0 | Status: SHIPPED | OUTPATIENT
Start: 2019-02-12 | End: 2019-03-12 | Stop reason: SDUPTHER

## 2019-02-12 RX ORDER — ARIPIPRAZOLE 20 MG/1
TABLET ORAL
Qty: 30 TABLET | Refills: 0 | Status: SHIPPED | OUTPATIENT
Start: 2019-02-12 | End: 2019-03-26 | Stop reason: SDUPTHER

## 2019-02-12 RX ORDER — HYDROXYZINE HYDROCHLORIDE 25 MG/1
TABLET, FILM COATED ORAL
Qty: 60 TABLET | Refills: 3 | Status: SHIPPED | OUTPATIENT
Start: 2019-02-12

## 2019-02-12 RX ORDER — OXCARBAZEPINE 300 MG/1
TABLET, FILM COATED ORAL
Qty: 60 TABLET | Refills: 3 | Status: SHIPPED | OUTPATIENT
Start: 2019-02-12 | End: 2019-06-18 | Stop reason: SDUPTHER

## 2019-02-12 RX ORDER — ESCITALOPRAM OXALATE 10 MG/1
10 TABLET ORAL DAILY
Qty: 30 TABLET | Refills: 3 | Status: SHIPPED | OUTPATIENT
Start: 2019-02-12 | End: 2019-05-01 | Stop reason: SDUPTHER

## 2019-02-12 RX ORDER — TRIHEXYPHENIDYL HYDROCHLORIDE 5 MG/1
TABLET ORAL
Qty: 90 TABLET | Refills: 3 | Status: SHIPPED | OUTPATIENT
Start: 2019-02-12

## 2019-02-12 RX ORDER — DONEPEZIL HYDROCHLORIDE 5 MG/1
5 TABLET, FILM COATED ORAL NIGHTLY
Qty: 30 TABLET | Refills: 0 | Status: SHIPPED | OUTPATIENT
Start: 2019-02-12 | End: 2019-03-12 | Stop reason: SDUPTHER

## 2019-03-12 RX ORDER — MIRTAZAPINE 7.5 MG/1
7.5 TABLET, FILM COATED ORAL NIGHTLY
Qty: 30 TABLET | Refills: 0 | Status: SHIPPED | OUTPATIENT
Start: 2019-03-12 | End: 2019-03-26 | Stop reason: SDUPTHER

## 2019-03-13 RX ORDER — DONEPEZIL HYDROCHLORIDE 5 MG/1
5 TABLET, FILM COATED ORAL NIGHTLY
Qty: 30 TABLET | Refills: 0 | Status: SHIPPED | OUTPATIENT
Start: 2019-03-13 | End: 2019-03-26 | Stop reason: SDUPTHER

## 2019-03-26 ENCOUNTER — OFFICE VISIT (OUTPATIENT)
Dept: PSYCHIATRY | Age: 64
End: 2019-03-26
Payer: MEDICARE

## 2019-03-26 VITALS
DIASTOLIC BLOOD PRESSURE: 86 MMHG | BODY MASS INDEX: 35.44 KG/M2 | SYSTOLIC BLOOD PRESSURE: 152 MMHG | HEART RATE: 86 BPM | WEIGHT: 200 LBS | HEIGHT: 63 IN | OXYGEN SATURATION: 94 %

## 2019-03-26 DIAGNOSIS — Z79.899 HIGH RISK MEDICATION USE: ICD-10-CM

## 2019-03-26 DIAGNOSIS — R41.3 MEMORY LOSS: ICD-10-CM

## 2019-03-26 DIAGNOSIS — F41.1 GAD (GENERALIZED ANXIETY DISORDER): ICD-10-CM

## 2019-03-26 DIAGNOSIS — G47.9 SLEEP DISTURBANCE: ICD-10-CM

## 2019-03-26 DIAGNOSIS — F41.8 ANXIETY ABOUT HEALTH: ICD-10-CM

## 2019-03-26 DIAGNOSIS — F31.9 BIPOLAR I DISORDER (HCC): Primary | ICD-10-CM

## 2019-03-26 PROCEDURE — 1036F TOBACCO NON-USER: CPT | Performed by: NURSE PRACTITIONER

## 2019-03-26 PROCEDURE — 3017F COLORECTAL CA SCREEN DOC REV: CPT | Performed by: NURSE PRACTITIONER

## 2019-03-26 PROCEDURE — 1090F PRES/ABSN URINE INCON ASSESS: CPT | Performed by: NURSE PRACTITIONER

## 2019-03-26 PROCEDURE — G8484 FLU IMMUNIZE NO ADMIN: HCPCS | Performed by: NURSE PRACTITIONER

## 2019-03-26 PROCEDURE — 1123F ACP DISCUSS/DSCN MKR DOCD: CPT | Performed by: NURSE PRACTITIONER

## 2019-03-26 PROCEDURE — G8427 DOCREV CUR MEDS BY ELIG CLIN: HCPCS | Performed by: NURSE PRACTITIONER

## 2019-03-26 PROCEDURE — G8400 PT W/DXA NO RESULTS DOC: HCPCS | Performed by: NURSE PRACTITIONER

## 2019-03-26 PROCEDURE — G8417 CALC BMI ABV UP PARAM F/U: HCPCS | Performed by: NURSE PRACTITIONER

## 2019-03-26 PROCEDURE — 99214 OFFICE O/P EST MOD 30 MIN: CPT | Performed by: NURSE PRACTITIONER

## 2019-03-26 PROCEDURE — 4040F PNEUMOC VAC/ADMIN/RCVD: CPT | Performed by: NURSE PRACTITIONER

## 2019-03-26 PROCEDURE — 1101F PT FALLS ASSESS-DOCD LE1/YR: CPT | Performed by: NURSE PRACTITIONER

## 2019-03-26 RX ORDER — ARIPIPRAZOLE 20 MG/1
TABLET ORAL
Qty: 30 TABLET | Refills: 3 | Status: SHIPPED | OUTPATIENT
Start: 2019-03-26

## 2019-03-26 RX ORDER — MIRTAZAPINE 7.5 MG/1
7.5 TABLET, FILM COATED ORAL NIGHTLY
Qty: 30 TABLET | Refills: 3 | Status: SHIPPED | OUTPATIENT
Start: 2019-03-26 | End: 2020-03-24 | Stop reason: ALTCHOICE

## 2019-03-26 RX ORDER — DONEPEZIL HYDROCHLORIDE 5 MG/1
5 TABLET, FILM COATED ORAL NIGHTLY
Qty: 30 TABLET | Refills: 3 | Status: SHIPPED | OUTPATIENT
Start: 2019-03-26

## 2019-05-01 RX ORDER — ESCITALOPRAM OXALATE 10 MG/1
10 TABLET ORAL DAILY
Qty: 30 TABLET | Refills: 3 | Status: SHIPPED | OUTPATIENT
Start: 2019-05-01

## 2019-05-01 NOTE — TELEPHONE ENCOUNTER
Last ov 03/26/19  Next ov 06/25/19   Requested Prescriptions     Pending Prescriptions Disp Refills    escitalopram (LEXAPRO) 10 MG tablet [Pharmacy Med Name: ESCITALOPRAM 10 MG TABLET 10 TAB] 30 tablet 3     Sig: Take 1 tablet by mouth daily     5/1/2019 1:56 PM   Progress Note        Concepcion Alegria 1955  Psychotherapy Time Spent: 21 min      Psychotherapy Topics: family, financial, health and marital    Chief Complaint   Patient presents with    Medication Refill         Subjective:  Patient is a 69 yo CF diagnosed with Bipolar D/O, LIBERTY, Memory loss, anxiety about health, and high risk medication use and presents today for follow-up. Last seen in clinic on 2/12/19 and prior records were reviewed. Patient is accompanied by , Daniel Griffiths. Today patient states she tolerated Aricept without side effects. Reports improvement in sleep overall with Remeron. Complains the last pain injection she had did not help her left side. Says she is having to take pain pills and she doesn't sleep well on pain pills. Complains of agitation.  says \"I'm having to do a whole lot more for her these days. \"  says he has \"pain medicine under lock and key. \" Patient returns to pain management at the end of May. 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 Patient says \"I have flashes of what I think is memory, but I'm not sure if it's something someone told me or not. \" Patient enjoys playing cards.  is concerned they might be moving into territory where he needs more help. Patient reports side effects as follows: none. No evidence of EPS, no cogwheeling or abnormal motor movements. Absent suicidal ideation. Reports compliance with medications as fair .      Review of Systems - 14 point review negative today except asthma, GERD, hypothyroidism  History obtained via chart review and patient  PCP is Cr Calhoun,   BP (!) 152/86 (Site: Right Upper Arm, Position: levothyroxine (SYNTHROID) 75 MCG tablet Take 75 mcg by mouth    Historical Provider, MD   lisinopril (PRINIVIL;ZESTRIL) 20 MG tablet Take 20 mg by mouth daily    Historical Provider, MD   metFORMIN (GLUCOPHAGE) 500 MG tablet Take 500 mg by mouth 2 times daily (with meals)    Historical Provider, MD      Lab Review   No visits with results within 2 Month(s) from this visit. Latest known visit with results is:   Admission on 04/02/2018, Discharged on 04/02/2018   Component Date Value    POC Glucose 04/02/2018 88     Performed on 04/02/2018 [employer]          MSE:  Patient is  A & O x3. Appearance:  well-appearing, street clothes, in chair, good grooming and good hygiene appropriately dressed for season and age. Cognition:  Recent memory impaired , remote memory impaired , fair fund of knowledge, average  intelligence level. Speech:  hesitant  Language: Naming: intact; Word Finding: impaired  Conversation no evidence of delusions  Behavior:  Sensitive and Poor eye contact  Mood: depressed, irritable, anxious and angry  Affect: congruent with mood  Thought Content: no evidence of overt psychosis, delusional thought or suicidal /homicidal ideation or plan  Thought Process: slow and associations inappropriate  Concentration/ attention span: fair  Judgement Insight:  diminished and inappropriate  Gait and Station:normal gait and station and normal balance   Musculoskeletal: WNL      Assesment:   No diagnosis found. Plan:  Continue medications as prescribed. Start Remeron 7.5 mg PO qhs for sleep  Start Aricept 5 mg PO qd  Continue Abilify 20 mg PO   Continue Trileptal 300 mg PO Bid  Continue Lexapro 10 mg PO qd  1. The risks, benefits, side effects, indications, contraindications, and adverse effects of the medications have been discussed. Yes.  2. The pt has verbalized understanding and has capacity to give informed consent. 3. The Zuly Anila report has been reviewed according to [facility] regulations.   4. Supportive therapy offered. 5. Follow up: No follow-ups on file. 6. The patient has been advised to call with any problems. 7. Controlled substance Treatment Plan. 8. The above listed medications have been continued, modifications in meds and other orders/labs as follows: No orders of the defined types were placed in this encounter. No orders of the defined types were placed in this encounter. 9. Additional comments:    Over 50% of the total visit time was spent on counseling and/or coordination of care.     MARY DunawayP-BC

## 2019-05-31 RX ORDER — MONTELUKAST SODIUM 10 MG/1
10 TABLET ORAL NIGHTLY
Qty: 30 TABLET | Refills: 11 | Status: SHIPPED | OUTPATIENT
Start: 2019-05-31 | End: 2022-01-26

## 2019-06-18 DIAGNOSIS — F31.9 BIPOLAR I DISORDER (HCC): ICD-10-CM

## 2019-06-18 RX ORDER — OXCARBAZEPINE 300 MG/1
TABLET, FILM COATED ORAL
Qty: 60 TABLET | Refills: 3 | Status: SHIPPED | OUTPATIENT
Start: 2019-06-18 | End: 2019-11-27 | Stop reason: SDUPTHER

## 2019-06-18 NOTE — TELEPHONE ENCOUNTER
Last OV:  03.26.19    Next OV:  (Provider Canceled)      Requested Prescriptions     Pending Prescriptions Disp Refills    OXcarbazepine (TRILEPTAL) 300 MG tablet 60 tablet 3     Sig: Take one tablet twice a day. 6/18/2019 3:15 PM   Progress Note        Mandy Bernal 1955  Psychotherapy Time Spent: 21 min      Psychotherapy Topics: family, financial, health and marital    Chief Complaint   Patient presents with    Medication Refill         Subjective:  Patient is a 71 yo CF diagnosed with Bipolar D/O, LIBERTY, Memory loss, anxiety about health, and high risk medication use and presents today for follow-up. Last seen in clinic on 2/12/19 and prior records were reviewed. Patient is accompanied by , Ammon Merlin. Today patient states she tolerated Aricept without side effects. Reports improvement in sleep overall with Remeron. Complains the last pain injection she had did not help her left side. Says she is having to take pain pills and she doesn't sleep well on pain pills. Complains of agitation.  says \"I'm having to do a whole lot more for her these days. \"  says he has \"pain medicine under lock and key. \" Patient returns to pain management at the end of May. 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 Patient says \"I have flashes of what I think is memory, but I'm not sure if it's something someone told me or not. \" Patient enjoys playing cards.  is concerned they might be moving into territory where he needs more help. Patient reports side effects as follows: none. No evidence of EPS, no cogwheeling or abnormal motor movements. Absent suicidal ideation. Reports compliance with medications as fair .      Review of Systems - 14 point review negative today except asthma, GERD, hypothyroidism  History obtained via chart review and patient  PCP is Shea Granados DO  BP (!) 152/86 (Site: Right Upper Arm, Position: Sitting, Cuff Size: Medium Adult) Pulse 86   Ht 5' 3\" (1.6 m)   Wt 200 lb (90.7 kg)   SpO2 94%   BMI 35.43 kg/m²       Current Meds:    Prior to Admission medications    Medication Sig Start Date End Date Taking? Authorizing Provider   escitalopram (LEXAPRO) 10 MG tablet Take 1 tablet by mouth daily 5/1/19   Tamara Yunior, APRN   donepezil (ARICEPT) 5 MG tablet Take 1 tablet by mouth nightly 3/26/19   Tamara Yunior, APRN   mirtazapine (REMERON) 7.5 MG tablet Take 1 tablet by mouth nightly 3/26/19   Tamara Yunior, APRN   ARIPiprazole (ABILIFY) 20 MG tablet Take one tablet daily 3/26/19   Tamara Yunior, APRN   trihexyphenidyl (ARTANE) 5 MG tablet TAKE 1 TABLET BY MOUTH THREE TIMES DAILY 2/12/19   Tamara Yunior, APRN   hydrOXYzine (ATARAX) 25 MG tablet Take 1 tablet by mouth two time daily as needed for anxiety 2/12/19   Tamara Yunior, APRN   OXcarbazepine (TRILEPTAL) 300 MG tablet Take one tablet twice a day. 2/12/19   Tamara Yunior, APRN   omeprazole (PRILOSEC) 40 MG delayed release capsule TAKE 1 CAPSULE BY MOUTH DAILY 7/16/18   BETY Denny   Melatonin 10 MG TABS Take 10 mg by mouth nightly 2/27/18   BETY Bernal   fluticasone (FLOVENT HFA) 110 MCG/ACT inhaler Inhale 1 puff into the lungs 2 times daily    Historical Provider, MD   Fluticasone Propionate (FLONASE NA) by Nasal route daily as needed     Historical Provider, MD   gabapentin (NEURONTIN) 600 MG tablet Take 600 mg by mouth 2 times daily     Historical Provider, MD   Multiple Vitamins-Minerals (THERAPEUTIC MULTIVITAMIN-MINERALS) tablet Take 1 tablet by mouth daily    Historical Provider, MD   SUMAtriptan (IMITREX) 100 MG tablet Take 100 mg by mouth once as needed for Migraine (MAY REPEAT IN 2 HRS.)    Historical Provider, MD   Mirabegron ER (MYRBETRIQ) 25 MG TB24 Take 25 mg by mouth daily 2/20/19 pt reports Dr. Vandana Salazar told her to take 50 mg daily.     Historical Provider, MD   levothyroxine (SYNTHROID) 75 MCG tablet Take 75 follow-ups on file. 6. The patient has been advised to call with any problems. 7. Controlled substance Treatment Plan. 8. The above listed medications have been continued, modifications in meds and other orders/labs as follows: No orders of the defined types were placed in this encounter. No orders of the defined types were placed in this encounter. 9. Additional comments:    Over 50% of the total visit time was spent on counseling and/or coordination of care.     Paramjit Maldonado, MARYP-BC

## 2019-06-19 NOTE — TELEPHONE ENCOUNTER
Sent to pharmacy as: OXcarbazepine 300 MG Oral Tablet    E-Prescribing Status: Sent to pharmacy (6/18/2019  9:50 PM CDT)

## 2019-06-26 NOTE — PROGRESS NOTES
Subjective:      Patient ID: Tino Cantrell is a 70 y.o. female  Kasie Olivo, DO  No ref. provider found    HPI      Chief Complaint   Patient presents with    Dysphagia     Patient c/o dysphagia. Off and on occurrence for last 6 months but more persistent in last 3 months. Problems with solid foods only. Associated with mild nausea and reflux. She has chronic GERD and takes omeprazole 40 mg daily. This provides adequate control but she still has some breakthrough symptoms. She denies vomiting, abdominal pain, melena, weight loss, anemia. Screening colonoscopy is up to date and she is without lower gi complaints. Family History   Problem Relation Age of Onset    No Known Problems Mother     Heart Disease Father     Colon Polyps Sister     Colon Cancer Neg Hx     Liver Cancer Neg Hx     Liver Disease Neg Hx     Esophageal Cancer Neg Hx     Rectal Cancer Neg Hx     Stomach Cancer Neg Hx        Past Medical History:   Diagnosis Date    Asthma     Bipolar disorder (HCC)     Chronic back pain     Depression     Diabetes mellitus ([address]     GERD (gastroesophageal reflux disease)     Headache(784.0)     History of adenomatous polyp of colon     Hypothyroidism        Past Surgical History:   Procedure Laterality Date    ANKLE SURGERY Right     APPENDECTOMY      BACK SURGERY      CHOLECYSTECTOMY      COLONOSCOPY  ?     Sheryl Vallejo COLONOSCOPY  4/10/2012    Dr. Cervantes Schools: tubular adenoma (5 yr)    ENDOSCOPY, COLON, DIAGNOSTIC      about 2 yrs ago    HYSTERECTOMY      RI COLONOSCOPY FLX DX W/COLLJ SPEC WHEN PFRMD N/A 7/31/2017    Dr Sindy Monsivais-aborted distal to splenic flexure due to poor prep    RI COLONOSCOPY W/BIOPSY SINGLE/MULTIPLE N/A 8/1/2017    Dr Sindy Godyo Morena Learn yr recall    RI EGD TRANSORAL BIOPSY SINGLE/MULTIPLE N/A 7/31/2017    Dr ROSALES Monsivais-Reactive gastropathy, reflux esophagitis, gastritis    TONSILLECTOMY      UPPER GASTROINTESTINAL ENDOSCOPY  3/1/2012     Aniya: GERD and gastritis, biopsy neg for intestinal metaplasia, dilation of peptic stricture     UPPER GASTROINTESTINAL ENDOSCOPY  4/2/2018    Dr ROSALES Espinal/dilation over wire-54 French-gastritis/gastropathy    UPPER GASTROINTESTINAL ENDOSCOPY  4/2/2018    Dr ROSALES Espinal/dilation over wire-54 French-gastritis/gastropathy       Current Outpatient Medications   Medication Sig Dispense Refill    montelukast (SINGULAIR) 10 MG tablet Take 10 mg by mouth      omeprazole (PRILOSEC) 40 MG delayed release capsule Take 1 capsule by mouth every morning (before breakfast) 30 capsule 11    OXcarbazepine (TRILEPTAL) 300 MG tablet Take one tablet twice a day. 60 tablet 3    escitalopram (LEXAPRO) 10 MG tablet Take 1 tablet by mouth daily 30 tablet 3    donepezil (ARICEPT) 5 MG tablet Take 1 tablet by mouth nightly 30 tablet 3    mirtazapine (REMERON) 7.5 MG tablet Take 1 tablet by mouth nightly 30 tablet 3    ARIPiprazole (ABILIFY) 20 MG tablet Take one tablet daily 30 tablet 3    trihexyphenidyl (ARTANE) 5 MG tablet TAKE 1 TABLET BY MOUTH THREE TIMES DAILY 90 tablet 3    hydrOXYzine (ATARAX) 25 MG tablet Take 1 tablet by mouth two time daily as needed for anxiety 60 tablet 3    fluticasone (FLOVENT HFA) 110 MCG/ACT inhaler Inhale 1 puff into the lungs 2 times daily      Fluticasone Propionate (FLONASE NA) by Nasal route daily as needed       gabapentin (NEURONTIN) 600 MG tablet Take 600 mg by mouth 2 times daily       SUMAtriptan (IMITREX) 100 MG tablet Take 100 mg by mouth once as needed for Migraine (MAY REPEAT IN 2 HRS.)      Mirabegron ER (MYRBETRIQ) 25 MG TB24 Take 25 mg by mouth daily 2/20/19 pt reports Dr. Miguelito Skelton told her to take 50 mg daily.       levothyroxine (SYNTHROID) 75 MCG tablet Take 75 mcg by mouth      lisinopril (PRINIVIL;ZESTRIL) 20 MG tablet Take 20 mg by mouth daily      metFORMIN (GLUCOPHAGE) 500 MG tablet Take 500 mg by mouth 2 times daily (with meals)       No current facility-administered medications for this visit. Allergies   Allergen Reactions    Codeine         reports that she has never smoked. She has never used smokeless tobacco. She reports that she does not drink alcohol or use drugs. Review of Systems   Constitutional: Negative for appetite change, fever and unexpected weight change. HENT: Positive for trouble swallowing. Negative for sore throat and voice change. Respiratory: Negative for cough, chest tightness and shortness of breath. Cardiovascular: Negative for chest pain, palpitations and leg swelling. Gastrointestinal: Negative for abdominal distention, abdominal pain, blood in stool, constipation, diarrhea, nausea, rectal pain and vomiting. Reflux   Musculoskeletal: Positive for arthralgias and back pain. Negative for gait problem. Skin: Negative for pallor, rash and wound. Neurological: Positive for headaches. Negative for dizziness, weakness and light-headedness. Hematological: Negative for adenopathy. Does not bruise/bleed easily. All other systems reviewed and are negative. Objective:   Physical Exam   Constitutional: She is oriented to person, place, and time. She appears well-developed and well-nourished. No distress. /70   Pulse 81   Ht 5' 3\" (1.6 m)   Wt 196 lb (88.9 kg)   SpO2 97%   BMI 34.72 kg/m²      Eyes: Conjunctivae are normal. No scleral icterus. Neck: No tracheal deviation present. Cardiovascular: Normal rate and regular rhythm. Exam reveals no gallop and no friction rub. No murmur heard. Pulmonary/Chest: Effort normal and breath sounds normal. No respiratory distress. She has no wheezes. She has no rhonchi. She has no rales. Abdominal: Soft. Normal appearance and bowel sounds are normal. She exhibits no distension and no mass. There is no hepatomegaly. There is no tenderness. There is no rebound and no guarding. Musculoskeletal: She exhibits no edema.    Neurological: She is alert and oriented to person, place, and time. She has normal strength. Skin: Skin is warm, dry and intact. No cyanosis. No pallor. Psychiatric: She has a normal mood and affect. Her behavior is normal. Thought content normal. Cognition and memory are normal.       Assessment:      1. Esophageal dysphagia    2. Chronic GERD    3. History of esophageal stricture          Plan:      Schedule EGD    Nothing to eat or drink after midnight. No driving for 24 hours after procedure. Bring a  to procedure. No aspirin, NSAIDs, fish oil 5 days before procedure. I have discussed the benefits, alternatives, and risks (including bleeding, perforation and death)  for pursuing Endoscopy (EGD/Colonscopy/EUS/ERCP) with the patient and they are willing to continue. We also discussed the need for anesthesia, IV access, proper dietary changes, medication changes if necessary, and need for bowel prep (if ordered) prior to their Endoscopic procedure. They are aware they must have someone accompany them to their scheduled procedure to drive them home - they agree to the above and are willing to continue. Plan for anesthesia: MAC  no reported complications     Medication refilled:     Orders Placed This Encounter   Medications    omeprazole (PRILOSEC) 40 MG delayed release capsule     Sig: Take 1 capsule by mouth every morning (before breakfast)     Dispense:  30 capsule     Refill:  11     Pt advised to call if any problems r/t medication.    Discussed medication including administration and side effects

## 2019-06-27 ENCOUNTER — OFFICE VISIT (OUTPATIENT)
Dept: GASTROENTEROLOGY | Age: 64
End: 2019-06-27
Payer: MEDICARE

## 2019-06-27 VITALS
HEIGHT: 63 IN | BODY MASS INDEX: 34.73 KG/M2 | SYSTOLIC BLOOD PRESSURE: 130 MMHG | HEART RATE: 81 BPM | DIASTOLIC BLOOD PRESSURE: 70 MMHG | OXYGEN SATURATION: 97 % | WEIGHT: 196 LBS

## 2019-06-27 DIAGNOSIS — K21.9 CHRONIC GERD: ICD-10-CM

## 2019-06-27 DIAGNOSIS — Z87.19 HISTORY OF ESOPHAGEAL STRICTURE: ICD-10-CM

## 2019-06-27 DIAGNOSIS — R13.19 ESOPHAGEAL DYSPHAGIA: Primary | ICD-10-CM

## 2019-06-27 PROCEDURE — 99214 OFFICE O/P EST MOD 30 MIN: CPT | Performed by: NURSE PRACTITIONER

## 2019-06-27 PROCEDURE — 1123F ACP DISCUSS/DSCN MKR DOCD: CPT | Performed by: NURSE PRACTITIONER

## 2019-06-27 PROCEDURE — 3017F COLORECTAL CA SCREEN DOC REV: CPT | Performed by: NURSE PRACTITIONER

## 2019-06-27 PROCEDURE — G8427 DOCREV CUR MEDS BY ELIG CLIN: HCPCS | Performed by: NURSE PRACTITIONER

## 2019-06-27 PROCEDURE — 1036F TOBACCO NON-USER: CPT | Performed by: NURSE PRACTITIONER

## 2019-06-27 PROCEDURE — 4040F PNEUMOC VAC/ADMIN/RCVD: CPT | Performed by: NURSE PRACTITIONER

## 2019-06-27 PROCEDURE — 1090F PRES/ABSN URINE INCON ASSESS: CPT | Performed by: NURSE PRACTITIONER

## 2019-06-27 PROCEDURE — G8417 CALC BMI ABV UP PARAM F/U: HCPCS | Performed by: NURSE PRACTITIONER

## 2019-06-27 PROCEDURE — G8400 PT W/DXA NO RESULTS DOC: HCPCS | Performed by: NURSE PRACTITIONER

## 2019-06-27 RX ORDER — OMEPRAZOLE 40 MG/1
40 CAPSULE, DELAYED RELEASE ORAL
Qty: 30 CAPSULE | Refills: 11 | Status: SHIPPED | OUTPATIENT
Start: 2019-06-27 | End: 2020-06-29

## 2019-06-27 RX ORDER — MONTELUKAST SODIUM 10 MG/1
10 TABLET ORAL
COMMUNITY
Start: 2019-05-31

## 2019-06-27 ASSESSMENT — ENCOUNTER SYMPTOMS
DIARRHEA: 0
BACK PAIN: 1
VOMITING: 0
ABDOMINAL PAIN: 0
CONSTIPATION: 0
CHEST TIGHTNESS: 0
VOICE CHANGE: 0
COUGH: 0
ABDOMINAL DISTENTION: 0
RECTAL PAIN: 0
SHORTNESS OF BREATH: 0
BLOOD IN STOOL: 0
SORE THROAT: 0
NAUSEA: 0
TROUBLE SWALLOWING: 1

## 2019-07-18 ENCOUNTER — OFFICE VISIT (OUTPATIENT)
Dept: PULMONOLOGY | Facility: CLINIC | Age: 64
End: 2019-07-18

## 2019-07-18 VITALS
DIASTOLIC BLOOD PRESSURE: 84 MMHG | OXYGEN SATURATION: 96 % | HEART RATE: 81 BPM | WEIGHT: 200.6 LBS | HEIGHT: 63 IN | SYSTOLIC BLOOD PRESSURE: 138 MMHG | BODY MASS INDEX: 35.54 KG/M2

## 2019-07-18 DIAGNOSIS — J45.40 MODERATE PERSISTENT ASTHMA WITHOUT COMPLICATION: Primary | ICD-10-CM

## 2019-07-18 DIAGNOSIS — F31.12 BIPOLAR 1 DISORDER, MANIC, MODERATE (HCC): ICD-10-CM

## 2019-07-18 DIAGNOSIS — I10 ESSENTIAL HYPERTENSION: ICD-10-CM

## 2019-07-18 DIAGNOSIS — R05.9 COUGH: ICD-10-CM

## 2019-07-18 DIAGNOSIS — R09.82 POSTNASAL DRIP: ICD-10-CM

## 2019-07-18 PROCEDURE — 99214 OFFICE O/P EST MOD 30 MIN: CPT | Performed by: NURSE PRACTITIONER

## 2019-07-18 RX ORDER — OMEPRAZOLE 40 MG/1
CAPSULE, DELAYED RELEASE ORAL
Refills: 11 | COMMUNITY
Start: 2019-06-27

## 2019-07-18 RX ORDER — GABAPENTIN 800 MG/1
800 TABLET ORAL 3 TIMES DAILY
Refills: 5 | COMMUNITY
Start: 2019-06-18

## 2019-07-18 RX ORDER — MIRTAZAPINE 7.5 MG/1
7.5 TABLET, FILM COATED ORAL NIGHTLY
Refills: 3 | COMMUNITY
Start: 2019-06-18

## 2019-07-18 RX ORDER — FLUTICASONE PROPIONATE 110 UG/1
1 AEROSOL, METERED RESPIRATORY (INHALATION)
COMMUNITY
End: 2020-09-10 | Stop reason: SDUPTHER

## 2019-07-18 RX ORDER — DONEPEZIL HYDROCHLORIDE 5 MG/1
5 TABLET, FILM COATED ORAL NIGHTLY
Refills: 3 | COMMUNITY
Start: 2019-06-18

## 2019-07-18 RX ORDER — ARIPIPRAZOLE 20 MG/1
20 TABLET ORAL DAILY
Refills: 3 | COMMUNITY
Start: 2019-07-02

## 2019-07-18 RX ORDER — HYDROCODONE BITARTRATE AND ACETAMINOPHEN 5; 325 MG/1; MG/1
1 TABLET ORAL EVERY 8 HOURS PRN
Refills: 0 | COMMUNITY
Start: 2019-05-20 | End: 2021-01-21 | Stop reason: HOSPADM

## 2019-07-18 RX ORDER — MIRABEGRON 50 MG/1
50 TABLET, FILM COATED, EXTENDED RELEASE ORAL DAILY
Refills: 0 | Status: ON HOLD | COMMUNITY
Start: 2019-06-18 | End: 2021-01-27

## 2019-07-18 RX ORDER — OXCARBAZEPINE 300 MG/1
300 TABLET, FILM COATED ORAL 3 TIMES DAILY
Refills: 3 | COMMUNITY
Start: 2019-06-19

## 2019-07-18 RX ORDER — ALBUTEROL SULFATE 90 UG/1
2 AEROSOL, METERED RESPIRATORY (INHALATION) EVERY 4 HOURS PRN
Qty: 1 INHALER | Refills: 2 | Status: SHIPPED | OUTPATIENT
Start: 2019-07-18 | End: 2019-08-17

## 2019-07-18 RX ORDER — HYDROXYZINE HYDROCHLORIDE 25 MG/1
TABLET, FILM COATED ORAL
Refills: 3 | COMMUNITY
Start: 2019-07-12

## 2019-07-18 RX ORDER — FLUTICASONE PROPIONATE 50 MCG
2 SPRAY, SUSPENSION (ML) NASAL DAILY PRN
COMMUNITY
End: 2022-01-26

## 2019-07-24 ENCOUNTER — ANESTHESIA EVENT (OUTPATIENT)
Dept: OPERATING ROOM | Age: 64
End: 2019-07-24

## 2019-07-26 ENCOUNTER — APPOINTMENT (OUTPATIENT)
Dept: OPERATING ROOM | Age: 64
End: 2019-07-26

## 2019-07-26 ENCOUNTER — ANESTHESIA (OUTPATIENT)
Dept: OPERATING ROOM | Age: 64
End: 2019-07-26

## 2019-07-26 ENCOUNTER — HOSPITAL ENCOUNTER (OUTPATIENT)
Age: 64
Setting detail: OUTPATIENT SURGERY
Discharge: HOME | End: 2019-07-26
Attending: INTERNAL MEDICINE | Admitting: INTERNAL MEDICINE
Payer: MEDICARE

## 2019-07-26 ENCOUNTER — HOSPITAL ENCOUNTER (OUTPATIENT)
Age: 64
Setting detail: SPECIMEN
Discharge: HOME | End: 2019-07-26
Payer: MEDICARE

## 2019-07-26 VITALS
DIASTOLIC BLOOD PRESSURE: 76 MMHG | WEIGHT: 200 LBS | RESPIRATION RATE: 18 BRPM | HEIGHT: 63 IN | SYSTOLIC BLOOD PRESSURE: 118 MMHG | HEART RATE: 67 BPM | BODY MASS INDEX: 35.44 KG/M2 | OXYGEN SATURATION: 95 %

## 2019-07-26 VITALS — DIASTOLIC BLOOD PRESSURE: 90 MMHG | SYSTOLIC BLOOD PRESSURE: 151 MMHG | OXYGEN SATURATION: 98 %

## 2019-07-26 PROCEDURE — 43239 EGD BIOPSY SINGLE/MULTIPLE: CPT | Performed by: INTERNAL MEDICINE

## 2019-07-26 PROCEDURE — 43239 EGD BIOPSY SINGLE/MULTIPLE: CPT

## 2019-07-26 PROCEDURE — 88305 TISSUE EXAM BY PATHOLOGIST: CPT

## 2019-07-26 PROCEDURE — 43248 EGD GUIDE WIRE INSERTION: CPT | Performed by: INTERNAL MEDICINE

## 2019-07-26 PROCEDURE — 88312 SPECIAL STAINS GROUP 1: CPT

## 2019-07-26 PROCEDURE — 43248 EGD GUIDE WIRE INSERTION: CPT

## 2019-07-26 RX ORDER — SODIUM CHLORIDE 9 MG/ML
INJECTION, SOLUTION INTRAVENOUS CONTINUOUS
Status: DISCONTINUED | OUTPATIENT
Start: 2019-07-26 | End: 2019-07-26 | Stop reason: HOSPADM

## 2019-07-26 RX ORDER — LIDOCAINE HYDROCHLORIDE 10 MG/ML
INJECTION, SOLUTION INFILTRATION; PERINEURAL PRN
Status: DISCONTINUED | OUTPATIENT
Start: 2019-07-26 | End: 2019-07-26 | Stop reason: SDUPTHER

## 2019-07-26 RX ORDER — PROPOFOL 10 MG/ML
INJECTION, EMULSION INTRAVENOUS PRN
Status: DISCONTINUED | OUTPATIENT
Start: 2019-07-26 | End: 2019-07-26 | Stop reason: SDUPTHER

## 2019-07-26 RX ADMIN — SODIUM CHLORIDE: 9 INJECTION, SOLUTION INTRAVENOUS at 09:13

## 2019-07-26 RX ADMIN — PROPOFOL 120 MG: 10 INJECTION, EMULSION INTRAVENOUS at 10:02

## 2019-07-26 RX ADMIN — LIDOCAINE HYDROCHLORIDE 40 MG: 10 INJECTION, SOLUTION INFILTRATION; PERINEURAL at 10:02

## 2019-07-26 NOTE — OP NOTE
Endoscopic Procedure Note    Patient: Karri London : 1955  Med Rec#: [id number] Acc#: [id number]     Primary Care Provider Josue Crespo DO  Referring Provider: BETY Cano    Endoscopist: Jalaine Boeck, MD    Date of Procedure:  2019    Procedure:   1. EGD with biopsy  2. EGD with wire-guided dilation- 47 F    Indications:   1. Dysphagia- hx of esophageal stricture  2. Reflux     Anesthesia:  Sedation was administered by anesthesia who monitored the patient during the procedure. Estimated Blood Loss: minimal    Procedure:   After reviewing the patient's chart and obtaining informed consent, the patient was placed in the left lateral decubitus position. A forward-viewing Olympus endoscope was lubricated and inserted through the mouth into the oropharynx. Under direct visualization, the upper esophagus was intubated. The scope was advanced to the level of the third portion of duodenum. Scope was slowly withdrawn with careful inspection of the mucosal surfaces. The scope was retroflexed for inspection of the gastric fundus and incisura. Findings and maneuvers are listed in impression below. The patient tolerated the procedure well. The scope was removed. There were no immediate complications. Findings:   Esophagus: abnormal: benign appearing stricture in the distal esophagus, dilated due to symptoms. A guidewire was placed through the endoscope and the endoscope was removed. A 54 Telugu savory dilator was advanced down the length of the esophagus using a fixed-point wire technique. The dilator and guidewire were removed. The patient tolerated the procedure well. Stomach:  Abnormal: Mild mucosal changes suggestive of gastritis noted -  Gastric biopsies were taken from the antrum and body to rule out Helicobacter pylori infection. Duodenum: normal      IMPRESSION:  1. Gastric biopsies obtained as above  2. S/p wire-guided dilation- 47 F     RECOMMENDATIONS:    1.

## 2019-07-26 NOTE — ANESTHESIA PRE PROCEDURE
Department of Anesthesiology  Preprocedure Note       Name:  Yoel Kent   Age:  70 y.o.  :  1955                                          MRN:  [id number]         Date:  2019      Surgeon: Kortney Ho):  Magaly Bojorquez MD    Procedure: Procedure(s):  EGD BIOPSY    Medications prior to admission:   Prior to Admission medications    Medication Sig Start Date End Date Taking? Authorizing Provider   montelukast (SINGULAIR) 10 MG tablet Take 10 mg by mouth 19   Historical Provider, MD   omeprazole (PRILOSEC) 40 MG delayed release capsule Take 1 capsule by mouth every morning (before breakfast) 19   BETY De La Cruz   OXcarbazepine (TRILEPTAL) 300 MG tablet Take one tablet twice a day.  19   BETY Cormier - NP   escitalopram (LEXAPRO) 10 MG tablet Take 1 tablet by mouth daily 19   BETY Calvert   donepezil (ARICEPT) 5 MG tablet Take 1 tablet by mouth nightly 3/26/19   BETY Calvert   mirtazapine (REMERON) 7.5 MG tablet Take 1 tablet by mouth nightly 3/26/19   BETY Calvert   ARIPiprazole (ABILIFY) 20 MG tablet Take one tablet daily 3/26/19   BETY Calvert   trihexyphenidyl (ARTANE) 5 MG tablet TAKE 1 TABLET BY MOUTH THREE TIMES DAILY 19   BETY Calvert   hydrOXYzine (ATARAX) 25 MG tablet Take 1 tablet by mouth two time daily as needed for anxiety 19   BETY Calvert   fluticasone (FLOVENT HFA) 110 MCG/ACT inhaler Inhale 1 puff into the lungs 2 times daily    Historical Provider, MD   Fluticasone Propionate (FLONASE NA) by Nasal route daily as needed     Historical Provider, MD   gabapentin (NEURONTIN) 600 MG tablet Take 600 mg by mouth 2 times daily     Historical Provider, MD   SUMAtriptan (IMITREX) 100 MG tablet Take 100 mg by mouth once as needed for Migraine (MAY REPEAT IN 2 HRS.)    Historical Provider, MD   Mirabegron ER (MYRBETRIQ) 25 MG TB24 Take 25 mg by mouth daily 19 pt reports  Massimo Mclean told her to take 50 mg daily. Historical Provider, MD   levothyroxine (SYNTHROID) 75 MCG tablet Take 75 mcg by mouth    Historical Provider, MD   lisinopril (PRINIVIL;ZESTRIL) 20 MG tablet Take 20 mg by mouth daily    Historical Provider, MD   metFORMIN (GLUCOPHAGE) 500 MG tablet Take 500 mg by mouth 2 times daily (with meals)    Historical Provider, MD       Current medications:    No current facility-administered medications for this visit. No current outpatient medications on file. Facility-Administered Medications Ordered in Other Visits   Medication Dose Route Frequency Provider Last Rate Last Dose    0.9 % sodium chloride infusion   Intravenous Continuous Luis Mello MD           Allergies:     Allergies   Allergen Reactions    Codeine        Problem List:    Patient Active Problem List   Diagnosis Code    NSAID long-term use Z79.1    History of esophageal stricture Z87.19    Hypothyroidism E03.9    Bipolar affective disorder, currently active ([facility] [facility] F31.9    Chronic back pain M54.9, G89.29    Depression F32.9    Asthma J45.909    Hyperkalemia E87.5    Anemia of unknown etiology D64.9    Lactic acid blood increased R79.89    Urinary tract infection without hematuria N39.0    Acute kidney injury ([address] M40.4    Metabolic encephalopathy Q38.28    Sinus bradycardia R00.1    Essential hypertension I10    Rigor R68.89    Lithium toxicity T56.891A    Acute kidney injury ([address] N17.9    Bradycardia R00.1    Encephalopathy, toxic G92    Delirium due to medical condition with behavioral disturbance F05    Bipolar affective disorder, mixed, severe, with psychotic behavior ([address] F31.64    Bipolar I disorder ([address] F31.9    High risk medication use Z79.899    Chronic GERD K21.9    Esophageal dysphagia R13.10    Bipolar I disorder, most recent episode mixed, severe without psychotic features ([address] F31.63    S/P dilatation of esophageal stricture Z98.890, Z87.19    Gastropathy H/o esophageal stricture    . Endo/Other:    (+) DiabetesType II DM, well controlled, , hypothyroidism::., .          Pt had no PAT visit       Abdominal:           Vascular: negative vascular ROS. Anesthesia Plan      general and TIVA     ASA 3       Induction: intravenous. Anesthetic plan and risks discussed with patient.                       BETY Yuan - CRNA   7/26/2019

## 2019-11-27 ENCOUNTER — TELEPHONE (OUTPATIENT)
Dept: PSYCHIATRY | Age: 64
End: 2019-11-27

## 2019-11-27 DIAGNOSIS — F31.9 BIPOLAR I DISORDER (HCC): ICD-10-CM

## 2019-11-27 RX ORDER — OXCARBAZEPINE 300 MG/1
TABLET, FILM COATED ORAL
Qty: 60 TABLET | Refills: 1 | Status: SHIPPED | OUTPATIENT
Start: 2019-11-27 | End: 2020-02-20 | Stop reason: SDUPTHER

## 2020-01-13 PROBLEM — E66.01 MORBIDLY OBESE (HCC): Status: ACTIVE | Noted: 2020-01-13

## 2020-01-23 ENCOUNTER — HOSPITAL ENCOUNTER (OUTPATIENT)
Dept: GENERAL RADIOLOGY | Age: 65
Discharge: HOME | End: 2020-01-23
Payer: MEDICARE

## 2020-01-23 PROCEDURE — 71046 X-RAY EXAM CHEST 2 VIEWS: CPT

## 2020-02-18 NOTE — TELEPHONE ENCOUNTER
mouth nightly 2/27/18   Yes KatharineBETY Dominguez   fluticasone (FLOVENT HFA) 110 MCG/ACT inhaler Inhale 1 puff into the lungs 2 times daily     Yes Historical Provider, MD   Fluticasone Propionate (FLONASE NA) by Nasal route daily as needed      Yes Historical Provider, MD   gabapentin (NEURONTIN) 600 MG tablet Take 600 mg by mouth 2 times daily      Yes Historical Provider, MD   Multiple Vitamins-Minerals (THERAPEUTIC MULTIVITAMIN-MINERALS) tablet Take 1 tablet by mouth daily     Yes Historical Provider, MD   SUMAtriptan (IMITREX) 100 MG tablet Take 100 mg by mouth once as needed for Migraine (MAY REPEAT IN 2 HRS. )     Yes Historical Provider, MD   Mirabegron ER (MYRBETRIQ) 25 MG TB24 Take 25 mg by mouth daily 2/20/19 pt reports Dr. Aftab Bravo told her to take 50 mg daily.     Yes Historical Provider, MD   levothyroxine (SYNTHROID) 75 MCG tablet Take 75 mcg by mouth     Yes Historical Provider, MD   lisinopril (PRINIVIL;ZESTRIL) 20 MG tablet Take 20 mg by mouth daily     Yes Historical Provider, MD   metFORMIN (GLUCOPHAGE) 500 MG tablet Take 500 mg by mouth 2 times daily (with meals)     Yes Historical Provider, MD         Lab Review         No visits with results within 2 Month(s) from this visit. Latest known visit with results is:   Admission on 04/02/2018, Discharged on 04/02/2018   Component Date Value    POC Glucose 04/02/2018 88     Performed on 04/02/2018 [employer]             MSE:  Patient is  A & O x3. Appearance:  well-appearing, street clothes, in chair, good grooming and good hygiene appropriately dressed for season and age. Cognition:  Recent memory impaired , remote memory impaired , fair fund of knowledge, average  intelligence level. Speech:  hesitant  Language: Naming: intact;  Word Finding: impaired  Conversation no evidence of delusions  Behavior:  Sensitive and Poor eye contact  Mood: depressed, irritable, anxious and angry  Affect: congruent with mood  Thought Content: no evidence of overt psychosis, delusional thought or suicidal /homicidal ideation or plan  Thought Process: slow and associations inappropriate  Concentration/ attention span: fair  Judgement Insight:  diminished and inappropriate  Gait and Station:normal gait and station and normal balance   Musculoskeletal: WNL        Assesment:      1. Bipolar I disorder ([address]    2. Memory loss    3. High risk medication use    4. Anxiety about health    5. LIBERTY (generalized anxiety disorder)    6. Sleep disturbance          Plan:  Continue medications as prescribed. Start Remeron 7.5 mg PO qhs for sleep  Start Aricept 5 mg PO qd  Continue Abilify 20 mg PO   Continue Trileptal 300 mg PO Bid  Continue Lexapro 10 mg PO qd  1. The risks, benefits, side effects, indications, contraindications, and adverse effects of the medications have been discussed. Yes.  2. The pt has verbalized understanding and has capacity to give informed consent. 3. The [facility] report has been reviewed according to [facility] regulations. 4. Supportive therapy offered. 5. Follow up:    Return in about 3 months (around 6/26/2019). 6. The patient has been advised to call with any problems. 7. Controlled substance Treatment Plan. 8. The above listed medications have been continued, modifications in meds and other orders/labs as follows:                 Encounter Medications         Orders Placed This Encounter   Medications    donepezil (ARICEPT) 5 MG tablet       Sig: Take 1 tablet by mouth nightly       Dispense:  30 tablet       Refill:  3    mirtazapine (REMERON) 7.5 MG tablet       Sig: Take 1 tablet by mouth nightly       Dispense:  30 tablet       Refill:  3    ARIPiprazole (ABILIFY) 20 MG tablet       Sig: Take one tablet daily       Dispense:  30 tablet       Refill:  3                     No orders of the defined types were placed in this encounter.        9.  Additional comments:     Over 50% of the total visit time was spent on counseling and/or coordination of irene.     Brittney Bautista, [facility] Hospital[facility]-BC

## 2020-02-20 RX ORDER — OXCARBAZEPINE 300 MG/1
TABLET, FILM COATED ORAL
Qty: 60 TABLET | Refills: 1 | Status: SHIPPED | OUTPATIENT
Start: 2020-02-20 | End: 2020-08-11

## 2020-02-21 ENCOUNTER — TELEPHONE (OUTPATIENT)
Dept: PSYCHIATRY | Age: 65
End: 2020-02-21

## 2020-03-23 NOTE — PATIENT INSTRUCTIONS
Patient Education        Breast Self-Exam: Care Instructions  Your Care Instructions    A breast self-exam is when you check your breasts for lumps or changes. This regular exam helps you learn how your breasts normally look and feel. Most breast problems or changes are not because of cancer. Breast self-exam is not a substitute for a mammogram. Having regular breast exams by your doctor and regular mammograms improve your chances of finding any problems with your breasts. Some women set a time each month to do a step-by-step breast self-exam. Other women like a less formal system. They might look at their breasts as they brush their teeth, or feel their breasts once in a while in the shower. If you notice a change in your breast, tell your doctor. Follow-up care is a key part of your treatment and safety. Be sure to make and go to all appointments, and call your doctor if you are having problems. It's also a good idea to know your test results and keep a list of the medicines you take. How do you do a breast self-exam?  · The best time to examine your breasts is usually one week after your menstrual period begins. Your breasts should not be tender then. If you do not have periods, you might do your exam on a day of the month that is easy to remember. · To examine your breasts:  ? Remove all your clothes above the waist and lie down. When you are lying down, your breast tissue spreads evenly over your chest wall, which makes it easier to feel all your breast tissue. ? Use the pads--not the fingertips--of the 3 middle fingers of your left hand to check your right breast. Move your fingers slowly in small coin-sized circles that overlap. ? Use three levels of pressure to feel of all your breast tissue. Use light pressure to feel the tissue close to the skin surface. Use medium pressure to feel a little deeper. Use firm pressure to feel your tissue close to your breastbone and ribs.  Use each pressure level to feel your breast tissue before moving on to the next spot. ? Check your entire breast, moving up and down as if following a strip from the collarbone to the bra line, and from the armpit to the ribs. Repeat until you have covered the entire breast.  ? Repeat this procedure for your left breast, using the pads of the 3 middle fingers of your right hand. · To examine your breasts while in the shower:  ? Place one arm over your head and lightly soap your breast on that side. ? Using the pads of your fingers, gently move your hand over your breast (in the strip pattern described above), feeling carefully for any lumps or changes. ? Repeat for the other breast.  · Have your doctor inspect anything you notice to see if you need further testing. Where can you learn more? Go to https://Enable Injectionspepiceweb.Photowhoa. org and sign in to your [employer] account. Enter P148 in the [employer] box to learn more about \"Breast Self-Exam: Care Instructions. \"     If you do not have an account, please click on the \"Sign Up Now\" link. Current as of: August 21, 2019Content Version: 12.4  © 5342-4564 [employer]. Care instructions adapted under license by [facility]. If you have questions about a medical condition or this instruction, always ask your healthcare professional. [address] any warranty or liability for your use of this information. Patient Education        Epidermoid Cyst: Care Instructions  Your Care Instructions  An epidermoid (say \"mp-uqu-MCH-moyd\") cyst is a lump just under the skin. These cysts can form when a hair follicle becomes blocked. They are common in acne and may occur on the face, neck, back, and genitals. However, they can form anywhere on the body. These cysts are not cancer and do not lead to cancer. They tend not to hurt, but they can sometimes become swollen and painful. They also may break open (rupture) and cause scarring.   These cysts sometimes do not cause problems and may not need treatment. If you have a cyst that is swollen and hurts, your doctor may inject it with a medicine to help it heal. But it is more likely that a painful cyst will need to be removed. Your doctor will give you a shot of numbing medicine and cut into the cyst to drain it or remove it. This makes the symptoms go away. Follow-up care is a key part of your treatment and safety. Be sure to make and go to all appointments, and call your doctor if you are having problems. It's also a good idea to know your test results and keep a list of the medicines you take. How can you care for yourself at home? · Do not squeeze the cyst or poke it with a needle to open it. This can cause swelling, redness, and infection. · Always have a doctor look at any new lumps you get to make sure that they are not serious. When should you call for help? Watch closely for changes in your health, and be sure to contact your doctor if:    · You have a fever, redness, or swelling after you get a shot of medicine in the cyst.     · You see or feel a new lump on your skin. Where can you learn more? Go to https://[employer].Ardelyx. org and sign in to your [employer] account. Enter D704 in the [facility] box to learn more about \"Epidermoid Cyst: Care Instructions. \"     If you do not have an account, please click on the \"Sign Up Now\" link. Current as of: October 30, 2019Content Version: 12.4  © 5491-4715 [employer]. Care instructions adapted under license by [facility] ([facility]). If you have questions about a medical condition or this instruction, always ask your healthcare professional. [place] [address] any warranty or liability for your use of this information.

## 2020-03-24 ENCOUNTER — OFFICE VISIT (OUTPATIENT)
Dept: OBGYN | Age: 65
End: 2020-03-24
Payer: MEDICARE

## 2020-03-24 VITALS
DIASTOLIC BLOOD PRESSURE: 90 MMHG | WEIGHT: 198 LBS | HEIGHT: 63 IN | SYSTOLIC BLOOD PRESSURE: 136 MMHG | BODY MASS INDEX: 35.08 KG/M2 | HEART RATE: 84 BPM

## 2020-03-24 PROCEDURE — 1090F PRES/ABSN URINE INCON ASSESS: CPT | Performed by: ADVANCED PRACTICE MIDWIFE

## 2020-03-24 PROCEDURE — G8400 PT W/DXA NO RESULTS DOC: HCPCS | Performed by: ADVANCED PRACTICE MIDWIFE

## 2020-03-24 PROCEDURE — 4040F PNEUMOC VAC/ADMIN/RCVD: CPT | Performed by: ADVANCED PRACTICE MIDWIFE

## 2020-03-24 PROCEDURE — 1123F ACP DISCUSS/DSCN MKR DOCD: CPT | Performed by: ADVANCED PRACTICE MIDWIFE

## 2020-03-24 PROCEDURE — G8417 CALC BMI ABV UP PARAM F/U: HCPCS | Performed by: ADVANCED PRACTICE MIDWIFE

## 2020-03-24 PROCEDURE — G8428 CUR MEDS NOT DOCUMENT: HCPCS | Performed by: ADVANCED PRACTICE MIDWIFE

## 2020-03-24 PROCEDURE — 99213 OFFICE O/P EST LOW 20 MIN: CPT | Performed by: ADVANCED PRACTICE MIDWIFE

## 2020-03-24 PROCEDURE — 3017F COLORECTAL CA SCREEN DOC REV: CPT | Performed by: ADVANCED PRACTICE MIDWIFE

## 2020-03-24 PROCEDURE — G8484 FLU IMMUNIZE NO ADMIN: HCPCS | Performed by: ADVANCED PRACTICE MIDWIFE

## 2020-03-24 PROCEDURE — 1036F TOBACCO NON-USER: CPT | Performed by: ADVANCED PRACTICE MIDWIFE

## 2020-03-24 RX ORDER — OXYBUTYNIN CHLORIDE 5 MG/1
5 TABLET ORAL 2 TIMES DAILY
COMMUNITY
Start: 2020-03-17

## 2020-03-24 RX ORDER — SULFAMETHOXAZOLE AND TRIMETHOPRIM 800; 160 MG/1; MG/1
1 TABLET ORAL 2 TIMES DAILY
Qty: 14 TABLET | Refills: 0 | Status: SHIPPED | OUTPATIENT
Start: 2020-03-24 | End: 2020-03-31

## 2020-03-24 RX ORDER — CLINDAMYCIN PHOSPHATE 10 MG/G
GEL TOPICAL
Qty: 30 G | Refills: 2 | Status: SHIPPED | OUTPATIENT
Start: 2020-03-24 | End: 2020-03-31

## 2020-03-24 RX ORDER — FLUTICASONE FUROATE AND VILANTEROL 100; 25 UG/1; UG/1
1 POWDER RESPIRATORY (INHALATION)
COMMUNITY

## 2020-03-24 RX ORDER — HYDROCODONE BITARTRATE AND ACETAMINOPHEN 5; 325 MG/1; MG/1
1 TABLET ORAL EVERY 8 HOURS PRN
COMMUNITY
Start: 2019-05-20 | End: 2021-04-01 | Stop reason: SINTOL

## 2020-03-24 RX ORDER — MIRTAZAPINE 7.5 MG/1
7.5 TABLET, FILM COATED ORAL NIGHTLY
COMMUNITY
Start: 2019-06-18 | End: 2021-01-14 | Stop reason: DRUGHIGH

## 2020-03-24 ASSESSMENT — ENCOUNTER SYMPTOMS
EYES NEGATIVE: 1
GASTROINTESTINAL NEGATIVE: 1
ALLERGIC/IMMUNOLOGIC NEGATIVE: 1
RESPIRATORY NEGATIVE: 1

## 2020-03-24 NOTE — PROGRESS NOTES
disease)     Headache(784.0)     History of adenomatous polyp of colon     Hypothyroidism      Past Surgical History:   Procedure Laterality Date    ANKLE SURGERY Right     APPENDECTOMY      BACK SURGERY      CHOLECYSTECTOMY      COLONOSCOPY  ?     Ata Maureen COLONOSCOPY  4/10/2012    Dr. Goyo Balderas: tubular adenoma (5 yr)    ENDOSCOPY, COLON, DIAGNOSTIC      about 2 yrs ago    HYSTERECTOMY      AL COLONOSCOPY FLX DX W/COLLJ SPEC WHEN PFRMD N/A 7/31/2017    Dr Natacha Monsivais-aborted distal to splenic flexure due to poor prep    AL COLONOSCOPY W/BIOPSY SINGLE/MULTIPLE N/A 8/1/2017    Dr Zafar Fruits yr recall    AL EGD TRANSORAL BIOPSY SINGLE/MULTIPLE N/A 7/31/2017    Dr ROSALES Monsivais-Reactive gastropathy, reflux esophagitis, gastritis    TONSILLECTOMY      UPPER GASTROINTESTINAL ENDOSCOPY  3/1/2012    Dr. Goyo Balderas: GERD and gastritis, biopsy neg for intestinal metaplasia, dilation of peptic stricture     UPPER GASTROINTESTINAL ENDOSCOPY  4/2/2018    Dr ROSALES Monsivais-w/dilation over wire-54 American-gastritis/gastropathy    UPPER GASTROINTESTINAL ENDOSCOPY  4/2/2018    Dr ROSALES Monsivais-w/dilation over wire-54 American-gastritis/gastropathy    UPPER GASTROINTESTINAL ENDOSCOPY N/A 7/26/2019    Dr ROSALES Monsivais-w/dilation over wire-54 F- benign appearing stricture in the distal esophagus, gastritis-Gastropathy    UPPER GASTROINTESTINAL ENDOSCOPY N/A 7/26/2019    Dr ROSALES Monsivais-w/dilation over wire-54 F- benign appearing stricture in the distal esophagus, gastritis-Gastropathy     Family History   Problem Relation Age of Onset    No Known Problems Mother     Heart Disease Father     Colon Polyps Sister     Colon Cancer Neg Hx     Liver Cancer Neg Hx     Liver Disease Neg Hx     Esophageal Cancer Neg Hx     Rectal Cancer Neg Hx     Stomach Cancer Neg Hx      Social History     Tobacco Use    Smoking status: Never Smoker    Smokeless tobacco: Never Used   Substance Use Topics    Alcohol use: No       Current Outpatient Medications   Medication Sig Dispense Refill    oxybutynin (DITROPAN) 5 MG tablet       Multiple Vitamins-Minerals (WOMENS MULTIVITAMIN PO) Take by mouth      HYDROcodone-acetaminophen (NORCO) 5-325 MG per tablet Take 1 tablet by mouth every 8 hours as needed.  fluticasone-vilanterol (BREO ELLIPTA) 100-25 MCG/INH AEPB inhaler Inhale 1 puff into the lungs Daily      VENTOLIN  (90 Base) MCG/ACT inhaler       mirtazapine (REMERON) 7.5 MG tablet Take 7.5 mg by mouth nightly      sulfamethoxazole-trimethoprim (BACTRIM DS;SEPTRA DS) 800-160 MG per tablet Take 1 tablet by mouth 2 times daily for 7 days 14 tablet 0    mupirocin (BACTROBAN NASAL) 2 % nasal ointment Take by Nasal route 2 times daily. 1 Tube 3    clindamycin (CLINDAGEL) 1 % gel Apply topically 2 times daily. 30 g 2    OXcarbazepine (TRILEPTAL) 300 MG tablet Take one tablet twice a day. 60 tablet 1    montelukast (SINGULAIR) 10 MG tablet Take 10 mg by mouth      omeprazole (PRILOSEC) 40 MG delayed release capsule Take 1 capsule by mouth every morning (before breakfast) 30 capsule 11    escitalopram (LEXAPRO) 10 MG tablet Take 1 tablet by mouth daily 30 tablet 3    donepezil (ARICEPT) 5 MG tablet Take 1 tablet by mouth nightly 30 tablet 3    ARIPiprazole (ABILIFY) 20 MG tablet Take one tablet daily 30 tablet 3    trihexyphenidyl (ARTANE) 5 MG tablet TAKE 1 TABLET BY MOUTH THREE TIMES DAILY 90 tablet 3    hydrOXYzine (ATARAX) 25 MG tablet Take 1 tablet by mouth two time daily as needed for anxiety 60 tablet 3    fluticasone (FLOVENT HFA) 110 MCG/ACT inhaler Inhale 1 puff into the lungs 2 times daily      Fluticasone Propionate (FLONASE NA) by Nasal route daily as needed       gabapentin (NEURONTIN) 600 MG tablet Take 800 mg by mouth 3 times daily.        SUMAtriptan (IMITREX) 100 MG tablet Take 100 mg by mouth once as needed for Migraine (MAY REPEAT IN 2 HRS.)      levothyroxine (SYNTHROID) 75 MCG tablet Take 75 mcg by mouth      Refill:  3    clindamycin (CLINDAGEL) 1 % gel     Sig: Apply topically 2 times daily. Dispense:  30 g     Refill:  2       ORDERS:  No orders of the defined types were placed in this encounter. PLAN:  1. Labial cyst - Begin hot moist compresses TID, Bactim DS orally, Clindamycin topically, and bactroban nasally for prophylaxis. If not improving, return for f/u and evaluation.

## 2020-03-25 PROBLEM — N17.9 ACUTE KIDNEY INJURY (HCC): Status: RESOLVED | Noted: 2017-02-05 | Resolved: 2020-03-24

## 2020-08-11 ENCOUNTER — OFFICE VISIT (OUTPATIENT)
Dept: GASTROENTEROLOGY | Age: 65
End: 2020-08-11
Payer: MEDICARE

## 2020-08-11 VITALS
DIASTOLIC BLOOD PRESSURE: 70 MMHG | SYSTOLIC BLOOD PRESSURE: 120 MMHG | BODY MASS INDEX: 33.13 KG/M2 | HEIGHT: 63 IN | WEIGHT: 187 LBS | HEART RATE: 75 BPM | OXYGEN SATURATION: 99 %

## 2020-08-11 PROCEDURE — 99214 OFFICE O/P EST MOD 30 MIN: CPT | Performed by: NURSE PRACTITIONER

## 2020-08-11 PROCEDURE — 4040F PNEUMOC VAC/ADMIN/RCVD: CPT | Performed by: NURSE PRACTITIONER

## 2020-08-11 PROCEDURE — G8417 CALC BMI ABV UP PARAM F/U: HCPCS | Performed by: NURSE PRACTITIONER

## 2020-08-11 PROCEDURE — 1090F PRES/ABSN URINE INCON ASSESS: CPT | Performed by: NURSE PRACTITIONER

## 2020-08-11 PROCEDURE — 3017F COLORECTAL CA SCREEN DOC REV: CPT | Performed by: NURSE PRACTITIONER

## 2020-08-11 PROCEDURE — G8400 PT W/DXA NO RESULTS DOC: HCPCS | Performed by: NURSE PRACTITIONER

## 2020-08-11 PROCEDURE — 4004F PT TOBACCO SCREEN RCVD TLK: CPT | Performed by: NURSE PRACTITIONER

## 2020-08-11 PROCEDURE — G8427 DOCREV CUR MEDS BY ELIG CLIN: HCPCS | Performed by: NURSE PRACTITIONER

## 2020-08-11 PROCEDURE — 1123F ACP DISCUSS/DSCN MKR DOCD: CPT | Performed by: NURSE PRACTITIONER

## 2020-08-11 ASSESSMENT — ENCOUNTER SYMPTOMS
CHEST TIGHTNESS: 0
COUGH: 0
NAUSEA: 0
BACK PAIN: 1
RECTAL PAIN: 0
ABDOMINAL PAIN: 0
VOMITING: 0
SHORTNESS OF BREATH: 0
BLOOD IN STOOL: 0
TROUBLE SWALLOWING: 1
CONSTIPATION: 1
ABDOMINAL DISTENTION: 0
SORE THROAT: 0
ANAL BLEEDING: 1
VOICE CHANGE: 0
DIARRHEA: 1

## 2020-08-11 NOTE — PATIENT INSTRUCTIONS
Schedule colonoscopy and endoscopy. Do not eat or drink after midnight the day of the procedure. Allowed medications can be taken with a small sip of water. Please review your prep instructions for allowed medications. You will not be able to drive for 24 hours after the procedure due to sedation. Bring a  with you the day of the procedure. If you are on blood thinners, clearance from the prescribing physician will be obtained before your procedure is scheduled. If it is determined it is not safe to hold these medications for a short time an alternative procedure for evaluation may be recommended. No aspirin, ibuprofen, naproxen, fish oil or vitamin E for 5 days before procedure. Risks of colonoscopy and endoscopy include, but are not limited to, perforation, bleeding, and infection, Risk of perforation and bleeding are increased if there is a polyp removed or dilation completed. Anesthesia risks will be reviewed with you before the procedure by a member of the anesthesia department. Your physician may also schedule a follow up appointment with the nurse practitioner to discuss pathology, symptoms or to check if you have had any problems related to your procedure. If you prefer not to return to the office after your procedure please discuss this with your physician on the day of your colonoscopy. The physician will talk with you and/or your family after the procedure is completed. Final recommendations are based on the pathologist report if biopsies or specimens are taken. For Colonoscopy: You will be given specific directions regarding restrictions to diet and bowel prep instructions including laxatives. Please read these instructions one week prior to your scheduled procedure to ensure that you are prepared.  If you have any questions regarding these instructions please call our office Mon through Fri from 8:00 am to 4:00 pm.     Follow prep instructions provided for bowel prep. Take all of the bowel prep as directed. If you are having problems with nausea, stop your prep for 30-45 min to allow the nausea to subside before resuming your prep. It is important to drink plenty of fluids throughout the day before taking your laxatives. This will help to protect your kidneys, prevent dehydration and maximize the effect of the bowel prep. If polyps are removed during the procedure they will be sent to a pathologist for analysis. Unless you have a follow up appointment scheduled, you will be notified by mail of the pathology results within 4 weeks. If you have not received results after 4 weeks you may call the office to obtain this information. Your diet before a colonoscopy bowel preparation is very important to ensure a successful colon exam. It is recommended to consider certain changes to your diet three to four days prior to the procedure. Remember that your bowels need to be empty for the exam.    What foods are good to eat? Cut down on heavy solid foods three to four days before the procedure and start introducing lighter meals to your diet. The following food suggestions are a good part of your diet before a colonoscopy bowel preparation. Light meat that is easily digestible such as chicken (without the skin)   Potatoes without skin   Cheese   Eggs   A light meal of steamed white fish   Light clear soups    Foods and drinks to avoid  Avoid foods that contain too much fiber. Stay clear of dark colored beverages. They can stick to the walls of the digestive tract and make it difficult to differentiate from blood. Some of these foods are:  Red meat, rice, nuts and vegetables   Milk, other milk based fluids and cream   Most fruit and puddings   Whole grain pasta   Cereals, bran and seeds   Colored beverages, especially those that are red or purple in color   Red colored Jell-O   On the day before the colonoscopy, continue to drink plenty of clear fluids.  It is important to keep yourself hydrated before the exam.     Please follow all instructions as provided for cleansing the bowel. Failure to have an adequately prepped colon may cause you to have incomplete exam with further testing required.      [email or web address]

## 2020-08-11 NOTE — PROGRESS NOTES
Subjective:      Patient ID: Annette Rodríguez is a 75 y.o. female  Francia Handy DO  No ref. provider found    HPI  Chief Complaint   Patient presents with    Dysphagia    Diarrhea       Patient c/o dysphagia. History of esophageal stricture. Last dilation 7/2019. Reflux treated with omeprazole 40 mg daily. She has been having swallowing problems for 6-8 months. She had good results after last dilation but did not last as long as usual. Problems with solid foods. Symptoms gradually worsening. Reflux is adequately controlled with daily PPI. She denies n/v, melena. Appetite is good. No weight loss. She also c/o diarrhea. This is something new for her. Typically has occasional constipation. For last several months she has been having bouts of diarrhea with liquid stools occurrening 3-4 times per day. This occurs at least twice a week and sometimes more. She still has normal stool and occasional constipation. She has bleeding noted with hard stools and straining only. No abdominal or rectal pain. She has history of adenomatous colon polyps. Last c-scope 3 yr ago. Assessment:      1. Esophageal dysphagia    2. Chronic GERD    3. Change in bowel habit    4. Diarrhea, unspecified type    5. History of adenomatous polyp of colon    6. History of esophageal stricture            Plan:      Schedule colonoscopy and endoscopy   Possible dilation    Instruct on bowel prep. Nothing to eat or drink after midnight the day of the exam.  Unable to drive for 24 hours after the procedure. No aspirin or nonsteroidal anti-inflammatories for 5 days before procedure. I have discussed the benefits, alternatives, and risks (including bleeding, perforation and death)  for pursuing Endoscopy (EGD/Colonscopy/EUS/ERCP) with the patient and they are willing to continue.  We also discussed the need for anesthesia, IV access, proper dietary changes, medication changes if necessary, and need for bowel prep (if ordered) prior to their Endoscopic procedure. They are aware they must have someone accompany them to their scheduled procedure to drive them home - they agree to the above and are willing to continue. Plan for anesthesia: MAC    Patient reports she has some dementia and needs person with her when receiving instructions.              Family History   Problem Relation Age of Onset    No Known Problems Mother     Heart Disease Father     Colon Polyps Sister     Colon Cancer Neg Hx     Liver Cancer Neg Hx     Liver Disease Neg Hx     Esophageal Cancer Neg Hx     Rectal Cancer Neg Hx     Stomach Cancer Neg Hx        Past Medical History:   Diagnosis Date    Asthma     Bipolar disorder (HCC)     Chronic back pain     Depression     Diabetes mellitus (HCC)     GERD (gastroesophageal reflux disease)     Headache(784.0)     History of adenomatous polyp of colon     Hypothyroidism        Past Surgical History:   Procedure Laterality Date    ANKLE SURGERY Right     APPENDECTOMY      BACK SURGERY      CHOLECYSTECTOMY      COLONOSCOPY  12/04/2006        COLONOSCOPY  4/10/2012    Dr. Love Conley: tubular adenoma (5 yr)    ENDOSCOPY, COLON, DIAGNOSTIC      about 2 yrs ago    HYSTERECTOMY      [place] COLONOSCOPY FLX DX W/COLLJ SPEC WHEN PFRMD N/A 7/31/2017    Dr Darrius Monsivais-aborted distal to splenic flexure due to poor prep    OK COLONOSCOPY W/BIOPSY SINGLE/MULTIPLE N/A 8/1/2017    Dr Darrius Cooper [place] yr recall    OK EGD TRANSORAL BIOPSY SINGLE/MULTIPLE N/A 7/31/2017    Dr ROSALES Monsivais-Reactive gastropathy, reflux esophagitis, gastritis    TONSILLECTOMY      UPPER GASTROINTESTINAL ENDOSCOPY  3/1/2012    Dr. Love Conley: GERD and gastritis, biopsy neg for intestinal metaplasia, dilation of peptic stricture     UPPER GASTROINTESTINAL ENDOSCOPY  4/2/2018    Dr ROSALES Monsivais-w/dilation over wire-54 Far[place]ese-gastritis/gastropathy    UPPER GASTROINTESTINAL ENDOSCOPY  4/2/2018    Dr ROSALES Monsivais-w/dilation Abdominal:      General: Bowel sounds are normal. There is no distension. Palpations: Abdomen is soft. There is no hepatomegaly or mass. Tenderness: There is no abdominal tenderness. There is no guarding or rebound. Skin:     General: Skin is warm and dry. Coloration: Skin is not pale. Neurological:      Mental Status: She is alert and oriented to person, place, and time. Psychiatric:         Mood and Affect: Mood normal.         Speech: Speech normal.         Behavior: Behavior normal.         Cognition and Memory: Memory is impaired.

## 2020-08-31 ENCOUNTER — OFFICE VISIT (OUTPATIENT)
Age: 65
End: 2020-08-31

## 2020-08-31 VITALS — OXYGEN SATURATION: 97 % | HEART RATE: 61 BPM | TEMPERATURE: 96.6 F

## 2020-09-02 LAB — SARS-COV-2, NAA: DETECTED

## 2020-09-02 RX ORDER — OMEPRAZOLE 40 MG/1
CAPSULE, DELAYED RELEASE ORAL
Qty: 30 CAPSULE | Refills: 5 | Status: SHIPPED | OUTPATIENT
Start: 2020-09-02 | End: 2021-03-09

## 2020-09-02 NOTE — TELEPHONE ENCOUNTER
Patient is scheduled for EGD/CLN for Friday 9/3/20 - she got her Covid test done, when I was checking test results I noticed hers was positive - I called the patient to make sure she knew her EGD/CLN were cancelled. She had not been contacted that her test was positive. I let her know that the health dept should be in contact with her, she would need to quarantine for 2 weeks. She said she hasn't felt well and has had a cough, no fever that she knew of. Her  lives in the home with her - I told her he may want to be tested as well, but until then they will want to practice social distancing.   I told her we could reschedule her EGD/CLN 1 week following the 2 week quarantine, but that everyone responds differently with the virus and their symptoms and to just call us back when she is ready to r/s - she voiced understanding

## 2020-09-10 ENCOUNTER — TELEPHONE (OUTPATIENT)
Dept: PULMONOLOGY | Facility: CLINIC | Age: 65
End: 2020-09-10

## 2020-09-10 RX ORDER — FLUTICASONE PROPIONATE 110 UG/1
1 AEROSOL, METERED RESPIRATORY (INHALATION)
Qty: 1 INHALER | Refills: 0 | Status: SHIPPED | OUTPATIENT
Start: 2020-09-10 | End: 2020-09-29

## 2020-09-22 PROBLEM — R05.9 COUGH: Status: RESOLVED | Noted: 2019-01-22 | Resolved: 2020-09-22

## 2020-09-29 ENCOUNTER — OFFICE VISIT (OUTPATIENT)
Dept: PULMONOLOGY | Facility: CLINIC | Age: 65
End: 2020-09-29

## 2020-09-29 VITALS
DIASTOLIC BLOOD PRESSURE: 80 MMHG | TEMPERATURE: 97.7 F | HEIGHT: 63 IN | BODY MASS INDEX: 32.25 KG/M2 | HEART RATE: 66 BPM | OXYGEN SATURATION: 97 % | WEIGHT: 182 LBS | SYSTOLIC BLOOD PRESSURE: 126 MMHG

## 2020-09-29 DIAGNOSIS — F31.12 BIPOLAR 1 DISORDER, MANIC, MODERATE (HCC): ICD-10-CM

## 2020-09-29 DIAGNOSIS — Z23 ENCOUNTER FOR IMMUNIZATION: ICD-10-CM

## 2020-09-29 DIAGNOSIS — E66.01 MORBIDLY OBESE (HCC): ICD-10-CM

## 2020-09-29 DIAGNOSIS — J45.40 MODERATE PERSISTENT ASTHMA WITHOUT COMPLICATION: Primary | ICD-10-CM

## 2020-09-29 DIAGNOSIS — K21.9 GASTROESOPHAGEAL REFLUX DISEASE WITHOUT ESOPHAGITIS: ICD-10-CM

## 2020-09-29 DIAGNOSIS — Z23 NEED FOR VACCINATION: ICD-10-CM

## 2020-09-29 PROCEDURE — 99214 OFFICE O/P EST MOD 30 MIN: CPT | Performed by: NURSE PRACTITIONER

## 2020-09-29 PROCEDURE — 90694 VACC AIIV4 NO PRSRV 0.5ML IM: CPT | Performed by: NURSE PRACTITIONER

## 2020-09-29 PROCEDURE — G0008 ADMIN INFLUENZA VIRUS VAC: HCPCS | Performed by: NURSE PRACTITIONER

## 2020-09-29 RX ORDER — OXYBUTYNIN CHLORIDE 5 MG/1
5 TABLET ORAL 3 TIMES DAILY
COMMUNITY
End: 2021-01-08

## 2020-09-29 RX ORDER — ESCITALOPRAM OXALATE 5 MG/1
5 TABLET ORAL DAILY
COMMUNITY

## 2020-12-07 ENCOUNTER — HOSPITAL ENCOUNTER (OUTPATIENT)
Facility: HOSPITAL | Age: 65
Setting detail: SURGERY ADMIT
End: 2020-12-07
Attending: ORTHOPAEDIC SURGERY | Admitting: ORTHOPAEDIC SURGERY

## 2020-12-07 ENCOUNTER — TELEPHONE (OUTPATIENT)
Dept: VASCULAR SURGERY | Facility: CLINIC | Age: 65
End: 2020-12-07

## 2021-01-07 ENCOUNTER — TELEPHONE (OUTPATIENT)
Dept: PODIATRY | Facility: CLINIC | Age: 66
End: 2021-01-07

## 2021-01-07 ENCOUNTER — OFFICE VISIT (OUTPATIENT)
Dept: VASCULAR SURGERY | Facility: CLINIC | Age: 66
End: 2021-01-07

## 2021-01-07 VITALS
BODY MASS INDEX: 32.07 KG/M2 | SYSTOLIC BLOOD PRESSURE: 124 MMHG | HEART RATE: 63 BPM | OXYGEN SATURATION: 99 % | HEIGHT: 63 IN | DIASTOLIC BLOOD PRESSURE: 82 MMHG | WEIGHT: 181 LBS

## 2021-01-07 DIAGNOSIS — G89.29 CHRONIC MIDLINE LOW BACK PAIN, UNSPECIFIED WHETHER SCIATICA PRESENT: Primary | ICD-10-CM

## 2021-01-07 DIAGNOSIS — M54.50 CHRONIC MIDLINE LOW BACK PAIN, UNSPECIFIED WHETHER SCIATICA PRESENT: Primary | ICD-10-CM

## 2021-01-07 PROCEDURE — 99204 OFFICE O/P NEW MOD 45 MIN: CPT | Performed by: SURGERY

## 2021-01-08 ENCOUNTER — HOSPITAL ENCOUNTER (OUTPATIENT)
Dept: GENERAL RADIOLOGY | Facility: HOSPITAL | Age: 66
Discharge: HOME | End: 2021-01-08

## 2021-01-08 ENCOUNTER — APPOINTMENT (OUTPATIENT)
Dept: PREADMISSION TESTING | Facility: HOSPITAL | Age: 66
End: 2021-01-08

## 2021-01-08 VITALS
RESPIRATION RATE: 16 BRPM | SYSTOLIC BLOOD PRESSURE: 125 MMHG | BODY MASS INDEX: 31.95 KG/M2 | OXYGEN SATURATION: 94 % | WEIGHT: 180.34 LBS | HEIGHT: 63 IN | HEART RATE: 62 BPM | DIASTOLIC BLOOD PRESSURE: 57 MMHG

## 2021-01-08 LAB
ALBUMIN SERPL-MCNC: 4.2 G/DL (ref 3.5–5.2)
ALBUMIN/GLOB SERPL: 1.6 G/DL
ALP SERPL-CCNC: 128 U/L (ref 39–117)
ALT SERPL W P-5'-P-CCNC: 11 U/L (ref 1–33)
ANION GAP SERPL CALCULATED.3IONS-SCNC: 7 MMOL/L (ref 5–15)
APTT PPP: 31.9 SECONDS (ref 24.1–35)
AST SERPL-CCNC: 14 U/L (ref 1–32)
BACTERIA UR QL AUTO: ABNORMAL /HPF
BILIRUB SERPL-MCNC: <0.2 MG/DL (ref 0–1.2)
BILIRUB UR QL STRIP: NEGATIVE
BUN SERPL-MCNC: 13 MG/DL (ref 8–23)
BUN/CREAT SERPL: 15.1 (ref 7–25)
CALCIUM SPEC-SCNC: 9.2 MG/DL (ref 8.6–10.5)
CHLORIDE SERPL-SCNC: 101 MMOL/L (ref 98–107)
CLARITY UR: ABNORMAL
CO2 SERPL-SCNC: 29 MMOL/L (ref 22–29)
COLOR UR: YELLOW
CREAT SERPL-MCNC: 0.86 MG/DL (ref 0.57–1)
DEPRECATED RDW RBC AUTO: 39.8 FL (ref 37–54)
ERYTHROCYTE [DISTWIDTH] IN BLOOD BY AUTOMATED COUNT: 12.7 % (ref 12.3–15.4)
GFR SERPL CREATININE-BSD FRML MDRD: 66 ML/MIN/1.73
GLOBULIN UR ELPH-MCNC: 2.6 GM/DL
GLUCOSE SERPL-MCNC: 98 MG/DL (ref 65–99)
GLUCOSE UR STRIP-MCNC: NEGATIVE MG/DL
HCT VFR BLD AUTO: 36 % (ref 34–46.6)
HGB BLD-MCNC: 11.2 G/DL (ref 12–15.9)
HGB UR QL STRIP.AUTO: NEGATIVE
HYALINE CASTS UR QL AUTO: ABNORMAL /LPF
INR PPP: 1 (ref 0.91–1.09)
KETONES UR QL STRIP: NEGATIVE
LEUKOCYTE ESTERASE UR QL STRIP.AUTO: ABNORMAL
MCH RBC QN AUTO: 27.1 PG (ref 26.6–33)
MCHC RBC AUTO-ENTMCNC: 31.1 G/DL (ref 31.5–35.7)
MCV RBC AUTO: 87.2 FL (ref 79–97)
NITRITE UR QL STRIP: NEGATIVE
PH UR STRIP.AUTO: 6 [PH] (ref 5–8)
PLATELET # BLD AUTO: 316 10*3/MM3 (ref 140–450)
PMV BLD AUTO: 8.8 FL (ref 6–12)
POTASSIUM SERPL-SCNC: 4.1 MMOL/L (ref 3.5–5.2)
PROT SERPL-MCNC: 6.8 G/DL (ref 6–8.5)
PROT UR QL STRIP: NEGATIVE
PROTHROMBIN TIME: 12.8 SECONDS (ref 11.9–14.6)
RBC # BLD AUTO: 4.13 10*6/MM3 (ref 3.77–5.28)
RBC # UR: ABNORMAL /HPF
REF LAB TEST METHOD: ABNORMAL
SODIUM SERPL-SCNC: 137 MMOL/L (ref 136–145)
SP GR UR STRIP: 1.02 (ref 1–1.03)
SQUAMOUS #/AREA URNS HPF: ABNORMAL /HPF
UROBILINOGEN UR QL STRIP: ABNORMAL
WBC # BLD AUTO: 7.11 10*3/MM3 (ref 3.4–10.8)
WBC UR QL AUTO: ABNORMAL /HPF

## 2021-01-08 PROCEDURE — 85610 PROTHROMBIN TIME: CPT

## 2021-01-08 PROCEDURE — 71045 X-RAY EXAM CHEST 1 VIEW: CPT

## 2021-01-08 PROCEDURE — 36415 COLL VENOUS BLD VENIPUNCTURE: CPT

## 2021-01-08 PROCEDURE — 93005 ELECTROCARDIOGRAM TRACING: CPT

## 2021-01-08 PROCEDURE — 85730 THROMBOPLASTIN TIME PARTIAL: CPT

## 2021-01-08 PROCEDURE — 93010 ELECTROCARDIOGRAM REPORT: CPT | Performed by: INTERNAL MEDICINE

## 2021-01-08 PROCEDURE — 80053 COMPREHEN METABOLIC PANEL: CPT

## 2021-01-08 PROCEDURE — 87186 SC STD MICRODIL/AGAR DIL: CPT

## 2021-01-08 PROCEDURE — 87086 URINE CULTURE/COLONY COUNT: CPT

## 2021-01-08 PROCEDURE — 85027 COMPLETE CBC AUTOMATED: CPT

## 2021-01-08 PROCEDURE — 81001 URINALYSIS AUTO W/SCOPE: CPT

## 2021-01-08 PROCEDURE — 87147 CULTURE TYPE IMMUNOLOGIC: CPT

## 2021-01-08 RX ORDER — OXYBUTYNIN CHLORIDE 5 MG/1
5 TABLET ORAL 2 TIMES DAILY
COMMUNITY

## 2021-01-09 LAB
QT INTERVAL: 466 MS
QTC INTERVAL: 433 MS

## 2021-01-11 LAB — BACTERIA SPEC AEROBE CULT: ABNORMAL

## 2021-01-13 NOTE — PATIENT INSTRUCTIONS
Patient Education        Functional Ovarian Cyst: Care Instructions  Your Care Instructions     A functional ovarian cyst is a sac that forms on the surface of a woman's ovary during ovulation. The sac holds a maturing egg. Usually the sac goes away after the egg is released. But if the egg is not released, or if the sac closes up after the egg is released, the sac can swell up with fluid and form a cyst.  Functional ovarian cysts are different than ovarian growths caused by other problems, such as cancer. Most functional ovarian cysts cause no symptoms and go away on their own. Some cause mild pain. Others can cause severe pain when they rupture or bleed. Follow-up care is a key part of your treatment and safety. Be sure to make and go to all appointments, and call your doctor if you are having problems. It's also a good idea to know your test results and keep a list of the medicines you take. How can you care for yourself at home? · Use heat, such as a hot water bottle, a heating pad set on low, or a warm bath, to relax tense muscles and relieve cramping. · Be safe with medicines. Take pain medicines exactly as directed. ? If the doctor gave you a prescription medicine for pain, take it as prescribed. ? If you are not taking a prescription pain medicine, ask your doctor if you can take an over-the-counter medicine. · Avoid constipation. Make sure you drink enough fluids and include fruits, vegetables, and fiber in your diet each day. Constipation does not cause ovarian cysts, but it may make your pelvic pain worse. When should you call for help? Call your doctor now or seek immediate medical care if:    · You have severe vaginal bleeding.     · You have new or worse belly or pelvic pain. Watch closely for changes in your health, and be sure to contact your doctor if:    · You have unusual vaginal bleeding.     · You do not get better as expected. Where can you learn more? Go to https://chpepiceweb.health-[email or web address]. org and sign in to your NextSpacet account. Enter S873 in the [place] box to learn more about \"Functional Ovarian Cyst: Care Instructions. \"     If you do not have an account, please click on the \"Sign Up Now\" link. Current as of: November 8, 2019               Content Version: 12.6  © 1269-7033 [employer]. Care instructions adapted under license by [facility] ([facility]). If you have questions about a medical condition or this instruction, always ask your healthcare professional. [place] [address] any warranty or liability for your use of this information. Patient Education        Vaginitis: Care Instructions  Your Care Instructions     Vaginitis is soreness or infection of the vagina. This common problem can cause itching and burning. And it can cause a change in vaginal discharge. Sometimes it can cause pain during sex. Vaginitis may be caused by bacteria, yeast, or other germs. Some infections that cause it are caught from a sexual partner. Bath products, spermicides, and douches can irritate the vagina too. Some women have this problem during and after menopause. A drop in estrogen levels during this time can cause dryness, soreness, and pain during sex. Your doctor can give you medicine to treat an infection. And home care may help you feel better. For certain types of infections, your sex partner must be treated too. Follow-up care is a key part of your treatment and safety. Be sure to make and go to all appointments, and call your doctor if you are having problems. It's also a good idea to know your test results and keep a list of the medicines you take. How can you care for yourself at home? · If your doctor prescribed antibiotics, take them as directed. Do not stop taking them just because you feel better. You need to take the full course of antibiotics. · Take your medicines exactly as prescribed. Call your doctor if you think you are having a problem with your medicine. · Do not eat or drink anything that has alcohol if you are taking metronidazole (Flagyl). · If you have a yeast infection, use over-the-counter products as your doctor tells you to. Or take medicine your doctor prescribes exactly as directed. · Wash your vaginal area daily with water. You also can use a mild, unscented soap if you want. · Do not use scented bath products. And do not use vaginal sprays or douches. · Put a washcloth soaked in cool water on the area to relieve itching. Or you can take cool baths. · If you have dryness because of menopause, use estrogen cream or pills that your doctor prescribes. · Ask your doctor about when it is okay to have sex. · Use a personal lubricant before sex if you have dryness. Examples are Astroglide, K-Y Jelly, and Wet Lubricant Gel. · Ask your doctor if your sex partner also needs treatment. When should you call for help? Call your doctor now or seek immediate medical care if:    · You have a fever and pelvic pain. Watch closely for changes in your health, and be sure to contact your doctor if:    · You have bleeding other than your period.     · You do not get better as expected. Where can you learn more? Go to https://3TIERpecathieb.healthPlum.io. org and sign in to your [employer] account. Enter H600 in the judge.me box to learn more about \"Vaginitis: Care Instructions. \"     If you do not have an account, please click on the \"Sign Up Now\" link. Current as of: November 8, 2019               Content Version: 12.6  © 7836-3574 [employer]. Care instructions adapted under license by [facility] ([facility]). If you have questions about a medical condition or this instruction, always ask your healthcare professional. [address] any warranty or liability for your use of this information.

## 2021-01-14 ENCOUNTER — OFFICE VISIT (OUTPATIENT)
Dept: OBGYN CLINIC | Age: 66
End: 2021-01-14
Payer: MEDICARE

## 2021-01-14 VITALS
WEIGHT: 176 LBS | DIASTOLIC BLOOD PRESSURE: 82 MMHG | HEIGHT: 63 IN | SYSTOLIC BLOOD PRESSURE: 135 MMHG | BODY MASS INDEX: 31.18 KG/M2 | HEART RATE: 67 BPM

## 2021-01-14 DIAGNOSIS — N90.7 LABIAL CYST: ICD-10-CM

## 2021-01-14 DIAGNOSIS — B37.31 CANDIDIASIS OF VULVA: Primary | ICD-10-CM

## 2021-01-14 PROCEDURE — G8427 DOCREV CUR MEDS BY ELIG CLIN: HCPCS | Performed by: ADVANCED PRACTICE MIDWIFE

## 2021-01-14 PROCEDURE — 1090F PRES/ABSN URINE INCON ASSESS: CPT | Performed by: ADVANCED PRACTICE MIDWIFE

## 2021-01-14 PROCEDURE — 1123F ACP DISCUSS/DSCN MKR DOCD: CPT | Performed by: ADVANCED PRACTICE MIDWIFE

## 2021-01-14 PROCEDURE — G8400 PT W/DXA NO RESULTS DOC: HCPCS | Performed by: ADVANCED PRACTICE MIDWIFE

## 2021-01-14 PROCEDURE — G8417 CALC BMI ABV UP PARAM F/U: HCPCS | Performed by: ADVANCED PRACTICE MIDWIFE

## 2021-01-14 PROCEDURE — 99214 OFFICE O/P EST MOD 30 MIN: CPT | Performed by: ADVANCED PRACTICE MIDWIFE

## 2021-01-14 PROCEDURE — 3017F COLORECTAL CA SCREEN DOC REV: CPT | Performed by: ADVANCED PRACTICE MIDWIFE

## 2021-01-14 PROCEDURE — G8484 FLU IMMUNIZE NO ADMIN: HCPCS | Performed by: ADVANCED PRACTICE MIDWIFE

## 2021-01-14 PROCEDURE — 4040F PNEUMOC VAC/ADMIN/RCVD: CPT | Performed by: ADVANCED PRACTICE MIDWIFE

## 2021-01-14 PROCEDURE — 1036F TOBACCO NON-USER: CPT | Performed by: ADVANCED PRACTICE MIDWIFE

## 2021-01-14 RX ORDER — MIRTAZAPINE 15 MG/1
TABLET, FILM COATED ORAL
COMMUNITY
Start: 2021-01-12

## 2021-01-14 RX ORDER — NYSTATIN 100000 [USP'U]/G
POWDER TOPICAL
Qty: 30 G | Refills: 0 | Status: SHIPPED | OUTPATIENT
Start: 2021-01-14 | End: 2021-04-20 | Stop reason: SDUPTHER

## 2021-01-14 RX ORDER — CLOTRIMAZOLE AND BETAMETHASONE DIPROPIONATE 10; .64 MG/G; MG/G
CREAM TOPICAL
Qty: 45 G | Refills: 1 | Status: SHIPPED | OUTPATIENT
Start: 2021-01-14 | End: 2021-04-20 | Stop reason: SDUPTHER

## 2021-01-14 RX ORDER — GABAPENTIN 800 MG/1
800 TABLET ORAL 3 TIMES DAILY
COMMUNITY
Start: 2020-12-31

## 2021-01-14 RX ORDER — OXCARBAZEPINE 300 MG/1
TABLET, FILM COATED ORAL
COMMUNITY
Start: 2021-01-12

## 2021-01-14 RX ORDER — FLUCONAZOLE 150 MG/1
150 TABLET ORAL
Qty: 2 TABLET | Refills: 0 | Status: SHIPPED | OUTPATIENT
Start: 2021-01-14 | End: 2021-01-20

## 2021-01-14 ASSESSMENT — ENCOUNTER SYMPTOMS
EYES NEGATIVE: 1
ALLERGIC/IMMUNOLOGIC NEGATIVE: 1
RESPIRATORY NEGATIVE: 1
GASTROINTESTINAL NEGATIVE: 1

## 2021-01-14 NOTE — PROGRESS NOTES
[id number] [facility] OB/GYN  CN Office Note    Miri Tran is a 77 y.o. female who presents today for her medical conditions/ complaints as noted below. Chief Complaint   Patient presents with    Ovarian Cyst     painful cyst; she's had a hysterectomy and everything removed. They're external, was given an antibiotic back in March. It helped and seemed they went away but came back and in the first of November started really hurting her. HPI  Mk Tyson presents for evaluation of painful labial cyst. She states she had in the fall but it \"comes and goes\". She states the symptoms have improved since the last flare. She also reports extreme labial itching and irritation. Patient Active Problem List   Diagnosis    NSAID long-term use    History of esophageal stricture    Hypothyroidism    Bipolar affective disorder, currently active ([address]    Chronic back pain    Depression    Asthma    Hyperkalemia    Anemia of unknown etiology    Lactic acid blood increased    Urinary tract infection without hematuria    Metabolic encephalopathy    Sinus bradycardia    Essential hypertension    Rigor    Lithium toxicity    Acute kidney injury ([address]    Bradycardia    Encephalopathy, toxic    Delirium due to medical condition with behavioral disturbance    Bipolar affective disorder, mixed, severe, with psychotic behavior ([address]    Bipolar I disorder ([address]    High risk medication use    Chronic GERD    Esophageal dysphagia    Bipolar I disorder, most recent episode mixed, severe without psychotic features ([address]    S/P dilatation of esophageal stricture    Gastropathy    Esophageal stricture       No LMP recorded (lmp unknown). Patient has had a hysterectomy.   [id number]    Past Medical History:   Diagnosis Date    Asthma     Bipolar disorder ([address]     Chronic back pain     Depression     Diabetes mellitus (HCC)     GERD (gastroesophageal reflux disease)     Headache(784.0) Current Outpatient Medications   Medication Sig Dispense Refill    gabapentin (NEURONTIN) 800 MG tablet       OXcarbazepine (TRILEPTAL) 300 MG tablet       mirtazapine (REMERON) 15 MG tablet       fluconazole (DIFLUCAN) 150 MG tablet Take 1 tablet by mouth every 72 hours for 6 days 2 tablet 0    nystatin (MYCOSTATIN) 268544 UNIT/GM powder Apply topically 4 times daily. 30 g 0    clotrimazole-betamethasone (LOTRISONE) 1-0.05 % cream Apply topically 2 times daily. 45 g 1    omeprazole (PRILOSEC) 40 MG delayed release capsule TAKE 1 CAPSULE BY MOUTH DAILY IN THE MORNING (BEFORE BREAKFAST) 30 capsule 5    oxybutynin (DITROPAN) 5 MG tablet       Multiple Vitamins-Minerals (WOMENS MULTIVITAMIN PO) Take by mouth      HYDROcodone-acetaminophen (NORCO) 5-325 MG per tablet Take 1 tablet by mouth every 8 hours as needed.       fluticasone-vilanterol (BREO ELLIPTA) 100-25 MCG/INH AEPB inhaler Inhale 1 puff into the lungs Daily      VENTOLIN  (90 Base) MCG/ACT inhaler       montelukast (SINGULAIR) 10 MG tablet Take 10 mg by mouth      escitalopram (LEXAPRO) 10 MG tablet Take 1 tablet by mouth daily 30 tablet 3    donepezil (ARICEPT) 5 MG tablet Take 1 tablet by mouth nightly 30 tablet 3    ARIPiprazole (ABILIFY) 20 MG tablet Take one tablet daily 30 tablet 3    trihexyphenidyl (ARTANE) 5 MG tablet TAKE 1 TABLET BY MOUTH THREE TIMES DAILY 90 tablet 3    hydrOXYzine (ATARAX) 25 MG tablet Take 1 tablet by mouth two time daily as needed for anxiety 60 tablet 3    fluticasone (FLOVENT HFA) 110 MCG/ACT inhaler Inhale 1 puff into the lungs 2 times daily      Fluticasone Propionate (FLONASE NA) by Nasal route daily as needed       SUMAtriptan (IMITREX) 100 MG tablet Take 100 mg by mouth once as needed for Migraine (MAY REPEAT IN 2 HRS.)      levothyroxine (SYNTHROID) 75 MCG tablet Take 75 mcg by mouth      metFORMIN (GLUCOPHAGE) 500 MG tablet Take 500 mg by mouth 2 times daily (with meals) No current facility-administered medications for this visit. Allergies   Allergen Reactions    Codeine      Vitals:    01/14/21 1359   BP: 135/82   Pulse: 67     Body mass index is 31.18 kg/m². Review of Systems   Constitutional: Negative. HENT: Negative. Eyes: Negative. Respiratory: Negative. Cardiovascular: Negative. Gastrointestinal: Negative. Endocrine: Negative. Genitourinary:        Labial itching and labial cyst     Musculoskeletal: Negative. Skin: Negative. Allergic/Immunologic: Negative. Neurological: Negative. Hematological: Negative. Psychiatric/Behavioral: Negative. Physical Exam  Constitutional:       Appearance: She is well-developed. She is not diaphoretic. HENT:      Head: Normocephalic and atraumatic. Nose: Nose normal.   Eyes:      Conjunctiva/sclera: Conjunctivae normal.      Pupils: Pupils are equal, round, and reactive to light. Neck:      Musculoskeletal: Normal range of motion and neck supple. Thyroid: No thyromegaly. Trachea: No tracheal deviation. Pulmonary:      Effort: Pulmonary effort is normal. No respiratory distress. Genitourinary:         Comments: Labial and groin erythema noted bilaterally, resembles yeast  Musculoskeletal: Normal range of motion. Comments: Normal ROM for upper and lower extremities. Gait steady. Skin:     General: Skin is warm and dry. Findings: No lesion or rash. Neurological:      Mental Status: She is alert and oriented to person, place, and time. Psychiatric:         Speech: Speech normal.         Behavior: Behavior normal.          Diagnosis Orders   1. Candidiasis of vulva     2.  Labial cyst         MEDICATIONS:  Orders Placed This Encounter   Medications    fluconazole (DIFLUCAN) 150 MG tablet     Sig: Take 1 tablet by mouth every 72 hours for 6 days     Dispense:  2 tablet     Refill:  0    nystatin (MYCOSTATIN) 181974 UNIT/GM powder Sig: Apply topically 4 times daily. Dispense:  30 g     Refill:  0    clotrimazole-betamethasone (LOTRISONE) 1-0.05 % cream     Sig: Apply topically 2 times daily. Dispense:  45 g     Refill:  1       ORDERS:  No orders of the defined types were placed in this encounter. PLAN:  1. Vulvar yeast - lotrisone and diflucan, nystatin powder for groin  2.  Labial cyst - hot packs and return if increases in size

## 2021-01-15 ENCOUNTER — TRANSCRIBE ORDERS (OUTPATIENT)
Dept: ADMINISTRATIVE | Facility: HOSPITAL | Age: 66
End: 2021-01-15

## 2021-01-15 DIAGNOSIS — Z11.59 SCREENING FOR VIRAL DISEASE: Primary | ICD-10-CM

## 2021-01-18 ENCOUNTER — LAB (OUTPATIENT)
Dept: LAB | Facility: HOSPITAL | Age: 66
End: 2021-01-18

## 2021-01-18 LAB — SARS-COV-2 ORF1AB RESP QL NAA+PROBE: NOT DETECTED

## 2021-01-18 PROCEDURE — C9803 HOPD COVID-19 SPEC COLLECT: HCPCS | Performed by: ORTHOPAEDIC SURGERY

## 2021-01-18 PROCEDURE — U0004 COV-19 TEST NON-CDC HGH THRU: HCPCS | Performed by: ORTHOPAEDIC SURGERY

## 2021-01-19 ENCOUNTER — ANESTHESIA EVENT (OUTPATIENT)
Dept: PERIOP | Facility: HOSPITAL | Age: 66
End: 2021-01-19

## 2021-01-20 ENCOUNTER — APPOINTMENT (OUTPATIENT)
Dept: GENERAL RADIOLOGY | Facility: HOSPITAL | Age: 66
End: 2021-01-20

## 2021-01-20 ENCOUNTER — HOSPITAL ENCOUNTER (INPATIENT)
Facility: HOSPITAL | Age: 66
LOS: 1 days | Discharge: HOME | End: 2021-01-21
Attending: ORTHOPAEDIC SURGERY | Admitting: ORTHOPAEDIC SURGERY

## 2021-01-20 ENCOUNTER — ANESTHESIA (OUTPATIENT)
Dept: PERIOP | Facility: HOSPITAL | Age: 66
End: 2021-01-20

## 2021-01-20 DIAGNOSIS — Z74.09 IMPAIRED MOBILITY: ICD-10-CM

## 2021-01-20 DIAGNOSIS — M54.41 CHRONIC MIDLINE LOW BACK PAIN WITH BILATERAL SCIATICA: Primary | ICD-10-CM

## 2021-01-20 DIAGNOSIS — M54.42 CHRONIC MIDLINE LOW BACK PAIN WITH BILATERAL SCIATICA: Primary | ICD-10-CM

## 2021-01-20 DIAGNOSIS — Z74.09 IMPAIRED MOBILITY AND ADLS: ICD-10-CM

## 2021-01-20 DIAGNOSIS — G89.29 CHRONIC MIDLINE LOW BACK PAIN WITH BILATERAL SCIATICA: Primary | ICD-10-CM

## 2021-01-20 DIAGNOSIS — Z78.9 IMPAIRED MOBILITY AND ADLS: ICD-10-CM

## 2021-01-20 PROBLEM — M48.061 LUMBAR STENOSIS: Status: ACTIVE | Noted: 2021-01-20

## 2021-01-20 LAB
ABO GROUP BLD: NORMAL
BLD GP AB SCN SERPL QL: NEGATIVE
GLUCOSE BLDC GLUCOMTR-MCNC: 122 MG/DL (ref 70–130)
GLUCOSE BLDC GLUCOMTR-MCNC: 88 MG/DL (ref 70–130)
RH BLD: POSITIVE
T&S EXPIRATION DATE: NORMAL

## 2021-01-20 PROCEDURE — 74018 RADEX ABDOMEN 1 VIEW: CPT

## 2021-01-20 PROCEDURE — 01NB0ZZ RELEASE LUMBAR NERVE, OPEN APPROACH: ICD-10-PCS | Performed by: ORTHOPAEDIC SURGERY

## 2021-01-20 PROCEDURE — 86901 BLOOD TYPING SEROLOGIC RH(D): CPT | Performed by: ORTHOPAEDIC SURGERY

## 2021-01-20 PROCEDURE — 25010000002 CEFAZOLIN 1-4 GM/50ML-% SOLUTION: Performed by: ORTHOPAEDIC SURGERY

## 2021-01-20 PROCEDURE — C1713 ANCHOR/SCREW BN/BN,TIS/BN: HCPCS | Performed by: ORTHOPAEDIC SURGERY

## 2021-01-20 PROCEDURE — 4A11X4G MONITORING OF PERIPHERAL NERVOUS ELECTRICAL ACTIVITY, INTRAOPERATIVE, EXTERNAL APPROACH: ICD-10-PCS | Performed by: ORTHOPAEDIC SURGERY

## 2021-01-20 PROCEDURE — 94799 UNLISTED PULMONARY SVC/PX: CPT

## 2021-01-20 PROCEDURE — 25010000002 HYDROMORPHONE 1 MG/ML SOLUTION

## 2021-01-20 PROCEDURE — 0SG30A0 FUSION OF LUMBOSACRAL JOINT WITH INTERBODY FUSION DEVICE, ANTERIOR APPROACH, ANTERIOR COLUMN, OPEN APPROACH: ICD-10-PCS | Performed by: ORTHOPAEDIC SURGERY

## 2021-01-20 PROCEDURE — 72100 X-RAY EXAM L-S SPINE 2/3 VWS: CPT

## 2021-01-20 PROCEDURE — 86900 BLOOD TYPING SEROLOGIC ABO: CPT | Performed by: ORTHOPAEDIC SURGERY

## 2021-01-20 PROCEDURE — 97535 SELF CARE MNGMENT TRAINING: CPT | Performed by: OCCUPATIONAL THERAPIST

## 2021-01-20 PROCEDURE — 97530 THERAPEUTIC ACTIVITIES: CPT | Performed by: PHYSICAL THERAPIST

## 2021-01-20 PROCEDURE — 82962 GLUCOSE BLOOD TEST: CPT

## 2021-01-20 PROCEDURE — 86850 RBC ANTIBODY SCREEN: CPT | Performed by: ORTHOPAEDIC SURGERY

## 2021-01-20 PROCEDURE — 0ST40ZZ RESECTION OF LUMBOSACRAL DISC, OPEN APPROACH: ICD-10-PCS | Performed by: ORTHOPAEDIC SURGERY

## 2021-01-20 PROCEDURE — 97166 OT EVAL MOD COMPLEX 45 MIN: CPT | Performed by: OCCUPATIONAL THERAPIST

## 2021-01-20 PROCEDURE — 76000 FLUOROSCOPY <1 HR PHYS/QHP: CPT

## 2021-01-20 PROCEDURE — 25010000002 CEFAZOLIN PER 500 MG: Performed by: ORTHOPAEDIC SURGERY

## 2021-01-20 PROCEDURE — 25010000002 PROPOFOL 10 MG/ML EMULSION: Performed by: NURSE ANESTHETIST, CERTIFIED REGISTERED

## 2021-01-20 PROCEDURE — 22558 ARTHRD ANT NTRBD MIN DSC LUM: CPT | Performed by: SURGERY

## 2021-01-20 PROCEDURE — 25010000002 ONDANSETRON PER 1 MG: Performed by: NURSE ANESTHETIST, CERTIFIED REGISTERED

## 2021-01-20 PROCEDURE — 25010000002 DEXAMETHASONE PER 1 MG: Performed by: NURSE ANESTHETIST, CERTIFIED REGISTERED

## 2021-01-20 PROCEDURE — 97161 PT EVAL LOW COMPLEX 20 MIN: CPT | Performed by: PHYSICAL THERAPIST

## 2021-01-20 DEVICE — LIGACLIP MCA MULTIPLE CLIP APPLIERS, 30 MEDIUM CLIPS
Type: IMPLANTABLE DEVICE | Site: ABDOMEN | Status: FUNCTIONAL
Brand: LIGACLIP

## 2021-01-20 DEVICE — KT HEMOST ABS SURGIFOAM PORCN 1GRAM: Type: IMPLANTABLE DEVICE | Site: ABDOMEN | Status: FUNCTIONAL

## 2021-01-20 DEVICE — SCRW STANDALONE M3 ALIF VARI S/TAP 5.5X25MM: Type: IMPLANTABLE DEVICE | Site: SPINE LUMBAR | Status: FUNCTIONAL

## 2021-01-20 DEVICE — CAGE ALIF STANDALONE M3 15DEG 27X40X16MM: Type: IMPLANTABLE DEVICE | Site: SPINE LUMBAR | Status: FUNCTIONAL

## 2021-01-20 DEVICE — ALLOGRFT FLD BIOBURST 1ML: Type: IMPLANTABLE DEVICE | Site: SPINE LUMBAR | Status: FUNCTIONAL

## 2021-01-20 DEVICE — BONE FILLER VOID NANOSS BIOACTIVE 3D 20CC: Type: IMPLANTABLE DEVICE | Site: SPINE LUMBAR | Status: FUNCTIONAL

## 2021-01-20 DEVICE — HEMOST ABS SURGIFOAM SZ100 8X12 10MM: Type: IMPLANTABLE DEVICE | Site: ABDOMEN | Status: FUNCTIONAL

## 2021-01-20 DEVICE — SCRW STANDALONE M3 ALIF VARI S/TAP 5.5X20MM: Type: IMPLANTABLE DEVICE | Site: SPINE LUMBAR | Status: FUNCTIONAL

## 2021-01-20 DEVICE — ALIF SCREW, 6.0MM X 25MM
Type: IMPLANTABLE DEVICE | Site: SPINE LUMBAR | Status: FUNCTIONAL
Brand: ASPIDA

## 2021-01-20 DEVICE — LK ASMBL STANDALONE M3 ALIF: Type: IMPLANTABLE DEVICE | Site: SPINE LUMBAR | Status: FUNCTIONAL

## 2021-01-20 DEVICE — LIGACLIP MCA MULTIPLE CLIP APPLIERS, 20 SMALL CLIPS
Type: IMPLANTABLE DEVICE | Site: ABDOMEN | Status: FUNCTIONAL
Brand: LIGACLIP

## 2021-01-20 DEVICE — 16MM SACRAL PLATE
Type: IMPLANTABLE DEVICE | Site: SPINE LUMBAR | Status: FUNCTIONAL
Brand: ASPIDA

## 2021-01-20 RX ORDER — KETAMINE HYDROCHLORIDE 50 MG/ML
INJECTION, SOLUTION, CONCENTRATE INTRAMUSCULAR; INTRAVENOUS AS NEEDED
Status: DISCONTINUED | OUTPATIENT
Start: 2021-01-20 | End: 2021-01-20 | Stop reason: SURG

## 2021-01-20 RX ORDER — SODIUM CHLORIDE 9 MG/ML
75 INJECTION, SOLUTION INTRAVENOUS CONTINUOUS
Status: DISPENSED | OUTPATIENT
Start: 2021-01-20 | End: 2021-01-21

## 2021-01-20 RX ORDER — OXYCODONE AND ACETAMINOPHEN 7.5; 325 MG/1; MG/1
1 TABLET ORAL EVERY 4 HOURS PRN
Status: DISCONTINUED | OUTPATIENT
Start: 2021-01-20 | End: 2021-01-21 | Stop reason: HOSPADM

## 2021-01-20 RX ORDER — SODIUM CHLORIDE 0.9 % (FLUSH) 0.9 %
10 SYRINGE (ML) INJECTION EVERY 12 HOURS SCHEDULED
Status: DISCONTINUED | OUTPATIENT
Start: 2021-01-20 | End: 2021-01-20 | Stop reason: HOSPADM

## 2021-01-20 RX ORDER — DEXAMETHASONE SODIUM PHOSPHATE 4 MG/ML
INJECTION, SOLUTION INTRA-ARTICULAR; INTRALESIONAL; INTRAMUSCULAR; INTRAVENOUS; SOFT TISSUE AS NEEDED
Status: DISCONTINUED | OUTPATIENT
Start: 2021-01-20 | End: 2021-01-20 | Stop reason: SURG

## 2021-01-20 RX ORDER — ONDANSETRON 4 MG/1
4 TABLET, FILM COATED ORAL EVERY 6 HOURS PRN
Status: DISCONTINUED | OUTPATIENT
Start: 2021-01-20 | End: 2021-01-21 | Stop reason: HOSPADM

## 2021-01-20 RX ORDER — MIRTAZAPINE 15 MG/1
7.5 TABLET, FILM COATED ORAL NIGHTLY
Status: DISCONTINUED | OUTPATIENT
Start: 2021-01-20 | End: 2021-01-21 | Stop reason: HOSPADM

## 2021-01-20 RX ORDER — DIPHENHYDRAMINE HCL 25 MG
25 CAPSULE ORAL NIGHTLY PRN
Status: DISCONTINUED | OUTPATIENT
Start: 2021-01-20 | End: 2021-01-21 | Stop reason: HOSPADM

## 2021-01-20 RX ORDER — ACETAMINOPHEN 500 MG
1000 TABLET ORAL ONCE
Status: DISCONTINUED | OUTPATIENT
Start: 2021-01-20 | End: 2021-01-20 | Stop reason: HOSPADM

## 2021-01-20 RX ORDER — OXYCODONE AND ACETAMINOPHEN 10; 325 MG/1; MG/1
1 TABLET ORAL ONCE AS NEEDED
Status: DISCONTINUED | OUTPATIENT
Start: 2021-01-20 | End: 2021-01-20 | Stop reason: HOSPADM

## 2021-01-20 RX ORDER — HYDROXYZINE HYDROCHLORIDE 25 MG/1
25 TABLET, FILM COATED ORAL 2 TIMES DAILY PRN
Status: DISCONTINUED | OUTPATIENT
Start: 2021-01-20 | End: 2021-01-21 | Stop reason: HOSPADM

## 2021-01-20 RX ORDER — HYDROMORPHONE HYDROCHLORIDE 1 MG/ML
0.5 INJECTION, SOLUTION INTRAMUSCULAR; INTRAVENOUS; SUBCUTANEOUS
Status: DISCONTINUED | OUTPATIENT
Start: 2021-01-20 | End: 2021-01-20 | Stop reason: HOSPADM

## 2021-01-20 RX ORDER — OXYCODONE HCL 20 MG/1
20 TABLET, FILM COATED, EXTENDED RELEASE ORAL ONCE
Status: COMPLETED | OUTPATIENT
Start: 2021-01-20 | End: 2021-01-20

## 2021-01-20 RX ORDER — TRIHEXYPHENIDYL HYDROCHLORIDE 2 MG/1
2 TABLET ORAL 3 TIMES DAILY
Status: DISCONTINUED | OUTPATIENT
Start: 2021-01-20 | End: 2021-01-21 | Stop reason: HOSPADM

## 2021-01-20 RX ORDER — ONDANSETRON 2 MG/ML
4 INJECTION INTRAMUSCULAR; INTRAVENOUS AS NEEDED
Status: DISCONTINUED | OUTPATIENT
Start: 2021-01-20 | End: 2021-01-20 | Stop reason: HOSPADM

## 2021-01-20 RX ORDER — ROCURONIUM BROMIDE 10 MG/ML
INJECTION, SOLUTION INTRAVENOUS AS NEEDED
Status: DISCONTINUED | OUTPATIENT
Start: 2021-01-20 | End: 2021-01-20 | Stop reason: SURG

## 2021-01-20 RX ORDER — IBUPROFEN 600 MG/1
600 TABLET ORAL ONCE AS NEEDED
Status: DISCONTINUED | OUTPATIENT
Start: 2021-01-20 | End: 2021-01-20 | Stop reason: HOSPADM

## 2021-01-20 RX ORDER — FENTANYL CITRATE 50 UG/ML
25 INJECTION, SOLUTION INTRAMUSCULAR; INTRAVENOUS
Status: DISCONTINUED | OUTPATIENT
Start: 2021-01-20 | End: 2021-01-20 | Stop reason: HOSPADM

## 2021-01-20 RX ORDER — SODIUM CHLORIDE 0.9 % (FLUSH) 0.9 %
3-10 SYRINGE (ML) INJECTION AS NEEDED
Status: DISCONTINUED | OUTPATIENT
Start: 2021-01-20 | End: 2021-01-20 | Stop reason: HOSPADM

## 2021-01-20 RX ORDER — CEFAZOLIN SODIUM 1 G/50ML
1 INJECTION, SOLUTION INTRAVENOUS EVERY 8 HOURS
Status: COMPLETED | OUTPATIENT
Start: 2021-01-20 | End: 2021-01-21

## 2021-01-20 RX ORDER — TIZANIDINE 4 MG/1
4 TABLET ORAL EVERY 8 HOURS PRN
Status: DISCONTINUED | OUTPATIENT
Start: 2021-01-20 | End: 2021-01-21 | Stop reason: HOSPADM

## 2021-01-20 RX ORDER — SUFENTANIL CITRATE 50 UG/ML
INJECTION EPIDURAL; INTRAVENOUS AS NEEDED
Status: DISCONTINUED | OUTPATIENT
Start: 2021-01-20 | End: 2021-01-20 | Stop reason: SURG

## 2021-01-20 RX ORDER — SODIUM CHLORIDE 0.9 % (FLUSH) 0.9 %
10 SYRINGE (ML) INJECTION AS NEEDED
Status: DISCONTINUED | OUTPATIENT
Start: 2021-01-20 | End: 2021-01-20 | Stop reason: HOSPADM

## 2021-01-20 RX ORDER — ESCITALOPRAM OXALATE 10 MG/1
5 TABLET ORAL DAILY
Status: DISCONTINUED | OUTPATIENT
Start: 2021-01-20 | End: 2021-01-21 | Stop reason: HOSPADM

## 2021-01-20 RX ORDER — SODIUM CHLORIDE 9 MG/ML
75 INJECTION, SOLUTION INTRAVENOUS CONTINUOUS
Status: DISCONTINUED | OUTPATIENT
Start: 2021-01-20 | End: 2021-01-21 | Stop reason: HOSPADM

## 2021-01-20 RX ORDER — LABETALOL HYDROCHLORIDE 5 MG/ML
5 INJECTION, SOLUTION INTRAVENOUS
Status: DISCONTINUED | OUTPATIENT
Start: 2021-01-20 | End: 2021-01-20 | Stop reason: HOSPADM

## 2021-01-20 RX ORDER — SODIUM CHLORIDE, SODIUM LACTATE, POTASSIUM CHLORIDE, CALCIUM CHLORIDE 600; 310; 30; 20 MG/100ML; MG/100ML; MG/100ML; MG/100ML
1000 INJECTION, SOLUTION INTRAVENOUS CONTINUOUS
Status: DISCONTINUED | OUTPATIENT
Start: 2021-01-20 | End: 2021-01-21 | Stop reason: HOSPADM

## 2021-01-20 RX ORDER — LEVOTHYROXINE SODIUM 0.07 MG/1
75 TABLET ORAL
Status: DISCONTINUED | OUTPATIENT
Start: 2021-01-20 | End: 2021-01-21 | Stop reason: HOSPADM

## 2021-01-20 RX ORDER — FAMOTIDINE 20 MG/1
20 TABLET, FILM COATED ORAL EVERY 12 HOURS SCHEDULED
Status: DISCONTINUED | OUTPATIENT
Start: 2021-01-20 | End: 2021-01-21 | Stop reason: HOSPADM

## 2021-01-20 RX ORDER — BUPIVACAINE HCL/0.9 % NACL/PF 0.1 %
2 PLASTIC BAG, INJECTION (ML) EPIDURAL ONCE
Status: COMPLETED | OUTPATIENT
Start: 2021-01-20 | End: 2021-01-20

## 2021-01-20 RX ORDER — SODIUM CHLORIDE, SODIUM LACTATE, POTASSIUM CHLORIDE, CALCIUM CHLORIDE 600; 310; 30; 20 MG/100ML; MG/100ML; MG/100ML; MG/100ML
100 INJECTION, SOLUTION INTRAVENOUS CONTINUOUS PRN
Status: DISCONTINUED | OUTPATIENT
Start: 2021-01-20 | End: 2021-01-21 | Stop reason: HOSPADM

## 2021-01-20 RX ORDER — FLUCONAZOLE 200 MG/1
100 TABLET ORAL ONCE
Status: COMPLETED | OUTPATIENT
Start: 2021-01-20 | End: 2021-01-20

## 2021-01-20 RX ORDER — SODIUM CHLORIDE 0.9 % (FLUSH) 0.9 %
3 SYRINGE (ML) INJECTION EVERY 12 HOURS SCHEDULED
Status: DISCONTINUED | OUTPATIENT
Start: 2021-01-20 | End: 2021-01-21 | Stop reason: HOSPADM

## 2021-01-20 RX ORDER — OXYBUTYNIN CHLORIDE 5 MG/1
5 TABLET ORAL 2 TIMES DAILY
Status: DISCONTINUED | OUTPATIENT
Start: 2021-01-20 | End: 2021-01-21 | Stop reason: HOSPADM

## 2021-01-20 RX ORDER — BUDESONIDE AND FORMOTEROL FUMARATE DIHYDRATE 80; 4.5 UG/1; UG/1
2 AEROSOL RESPIRATORY (INHALATION)
Status: DISCONTINUED | OUTPATIENT
Start: 2021-01-20 | End: 2021-01-21 | Stop reason: HOSPADM

## 2021-01-20 RX ORDER — DONEPEZIL HYDROCHLORIDE 5 MG/1
5 TABLET, FILM COATED ORAL NIGHTLY
Status: DISCONTINUED | OUTPATIENT
Start: 2021-01-20 | End: 2021-01-21 | Stop reason: HOSPADM

## 2021-01-20 RX ORDER — ARIPIPRAZOLE 10 MG/1
20 TABLET ORAL DAILY
Status: DISCONTINUED | OUTPATIENT
Start: 2021-01-20 | End: 2021-01-21 | Stop reason: HOSPADM

## 2021-01-20 RX ORDER — MONTELUKAST SODIUM 10 MG/1
10 TABLET ORAL NIGHTLY
Status: DISCONTINUED | OUTPATIENT
Start: 2021-01-20 | End: 2021-01-21 | Stop reason: HOSPADM

## 2021-01-20 RX ORDER — PROPOFOL 10 MG/ML
VIAL (ML) INTRAVENOUS AS NEEDED
Status: DISCONTINUED | OUTPATIENT
Start: 2021-01-20 | End: 2021-01-20 | Stop reason: SURG

## 2021-01-20 RX ORDER — ONDANSETRON 2 MG/ML
4 INJECTION INTRAMUSCULAR; INTRAVENOUS EVERY 6 HOURS PRN
Status: DISCONTINUED | OUTPATIENT
Start: 2021-01-20 | End: 2021-01-21 | Stop reason: HOSPADM

## 2021-01-20 RX ORDER — FLUTICASONE PROPIONATE 50 MCG
2 SPRAY, SUSPENSION (ML) NASAL DAILY PRN
Status: DISCONTINUED | OUTPATIENT
Start: 2021-01-20 | End: 2021-01-21 | Stop reason: HOSPADM

## 2021-01-20 RX ORDER — ESMOLOL HYDROCHLORIDE 10 MG/ML
INJECTION INTRAVENOUS AS NEEDED
Status: DISCONTINUED | OUTPATIENT
Start: 2021-01-20 | End: 2021-01-20 | Stop reason: SURG

## 2021-01-20 RX ORDER — SODIUM CHLORIDE, SODIUM LACTATE, POTASSIUM CHLORIDE, CALCIUM CHLORIDE 600; 310; 30; 20 MG/100ML; MG/100ML; MG/100ML; MG/100ML
1000 INJECTION, SOLUTION INTRAVENOUS CONTINUOUS
Status: DISCONTINUED | OUTPATIENT
Start: 2021-01-20 | End: 2021-01-20

## 2021-01-20 RX ORDER — GABAPENTIN 400 MG/1
800 CAPSULE ORAL EVERY 8 HOURS SCHEDULED
Status: DISCONTINUED | OUTPATIENT
Start: 2021-01-20 | End: 2021-01-21 | Stop reason: HOSPADM

## 2021-01-20 RX ORDER — OXYBUTYNIN CHLORIDE 5 MG/1
5 TABLET, EXTENDED RELEASE ORAL DAILY
Status: DISCONTINUED | OUTPATIENT
Start: 2021-01-20 | End: 2021-01-20 | Stop reason: SDUPTHER

## 2021-01-20 RX ORDER — SODIUM CHLORIDE, SODIUM LACTATE, POTASSIUM CHLORIDE, CALCIUM CHLORIDE 600; 310; 30; 20 MG/100ML; MG/100ML; MG/100ML; MG/100ML
100 INJECTION, SOLUTION INTRAVENOUS CONTINUOUS
Status: DISCONTINUED | OUTPATIENT
Start: 2021-01-20 | End: 2021-01-21 | Stop reason: HOSPADM

## 2021-01-20 RX ORDER — LIDOCAINE HYDROCHLORIDE 20 MG/ML
INJECTION, SOLUTION EPIDURAL; INFILTRATION; INTRACAUDAL; PERINEURAL AS NEEDED
Status: DISCONTINUED | OUTPATIENT
Start: 2021-01-20 | End: 2021-01-20 | Stop reason: SURG

## 2021-01-20 RX ORDER — LIDOCAINE HYDROCHLORIDE 10 MG/ML
0.5 INJECTION, SOLUTION EPIDURAL; INFILTRATION; INTRACAUDAL; PERINEURAL ONCE AS NEEDED
Status: DISCONTINUED | OUTPATIENT
Start: 2021-01-20 | End: 2021-01-20 | Stop reason: HOSPADM

## 2021-01-20 RX ORDER — FLUMAZENIL 0.1 MG/ML
0.2 INJECTION INTRAVENOUS AS NEEDED
Status: DISCONTINUED | OUTPATIENT
Start: 2021-01-20 | End: 2021-01-20 | Stop reason: HOSPADM

## 2021-01-20 RX ORDER — SODIUM CHLORIDE 0.9 % (FLUSH) 0.9 %
10 SYRINGE (ML) INJECTION AS NEEDED
Status: DISCONTINUED | OUTPATIENT
Start: 2021-01-20 | End: 2021-01-21 | Stop reason: HOSPADM

## 2021-01-20 RX ORDER — SODIUM CHLORIDE 0.9 % (FLUSH) 0.9 %
3 SYRINGE (ML) INJECTION EVERY 12 HOURS SCHEDULED
Status: DISCONTINUED | OUTPATIENT
Start: 2021-01-20 | End: 2021-01-20 | Stop reason: HOSPADM

## 2021-01-20 RX ORDER — EPHEDRINE SULFATE 50 MG/ML
INJECTION, SOLUTION INTRAVENOUS AS NEEDED
Status: DISCONTINUED | OUTPATIENT
Start: 2021-01-20 | End: 2021-01-20 | Stop reason: SURG

## 2021-01-20 RX ORDER — FLUCONAZOLE 100 MG/1
100 TABLET ORAL DAILY
COMMUNITY
End: 2022-02-07

## 2021-01-20 RX ORDER — NEOSTIGMINE METHYLSULFATE 5 MG/5 ML
SYRINGE (ML) INTRAVENOUS AS NEEDED
Status: DISCONTINUED | OUTPATIENT
Start: 2021-01-20 | End: 2021-01-20 | Stop reason: SURG

## 2021-01-20 RX ORDER — OXCARBAZEPINE 300 MG/1
300 TABLET, FILM COATED ORAL 3 TIMES DAILY
Status: DISCONTINUED | OUTPATIENT
Start: 2021-01-20 | End: 2021-01-21 | Stop reason: HOSPADM

## 2021-01-20 RX ORDER — SUMATRIPTAN 50 MG/1
100 TABLET, FILM COATED ORAL DAILY PRN
Status: DISCONTINUED | OUTPATIENT
Start: 2021-01-20 | End: 2021-01-21 | Stop reason: HOSPADM

## 2021-01-20 RX ORDER — ONDANSETRON 2 MG/ML
INJECTION INTRAMUSCULAR; INTRAVENOUS AS NEEDED
Status: DISCONTINUED | OUTPATIENT
Start: 2021-01-20 | End: 2021-01-20 | Stop reason: SURG

## 2021-01-20 RX ORDER — LIDOCAINE HYDROCHLORIDE 10 MG/ML
0.5 INJECTION, SOLUTION EPIDURAL; INFILTRATION; INTRACAUDAL; PERINEURAL ONCE AS NEEDED
Status: DISCONTINUED | OUTPATIENT
Start: 2021-01-20 | End: 2021-01-20 | Stop reason: SDUPTHER

## 2021-01-20 RX ORDER — FAMOTIDINE 10 MG/ML
20 INJECTION, SOLUTION INTRAVENOUS EVERY 12 HOURS SCHEDULED
Status: DISCONTINUED | OUTPATIENT
Start: 2021-01-20 | End: 2021-01-21 | Stop reason: HOSPADM

## 2021-01-20 RX ORDER — NALOXONE HCL 0.4 MG/ML
0.04 VIAL (ML) INJECTION AS NEEDED
Status: DISCONTINUED | OUTPATIENT
Start: 2021-01-20 | End: 2021-01-20 | Stop reason: HOSPADM

## 2021-01-20 RX ORDER — ONDANSETRON 2 MG/ML
4 INJECTION INTRAMUSCULAR; INTRAVENOUS EVERY 6 HOURS PRN
Status: DISCONTINUED | OUTPATIENT
Start: 2021-01-20 | End: 2021-01-20

## 2021-01-20 RX ORDER — MAGNESIUM HYDROXIDE 1200 MG/15ML
LIQUID ORAL AS NEEDED
Status: DISCONTINUED | OUTPATIENT
Start: 2021-01-20 | End: 2021-01-20 | Stop reason: HOSPADM

## 2021-01-20 RX ADMIN — KETAMINE HYDROCHLORIDE 10 MG: 50 INJECTION, SOLUTION INTRAMUSCULAR; INTRAVENOUS at 08:10

## 2021-01-20 RX ADMIN — SODIUM CHLORIDE, POTASSIUM CHLORIDE, SODIUM LACTATE AND CALCIUM CHLORIDE 1000 ML: 600; 310; 30; 20 INJECTION, SOLUTION INTRAVENOUS at 07:10

## 2021-01-20 RX ADMIN — SUFENTANIL CITRATE 10 MCG: 50 INJECTION EPIDURAL; INTRAVENOUS at 07:40

## 2021-01-20 RX ADMIN — BUDESONIDE AND FORMOTEROL FUMARATE DIHYDRATE 2 PUFF: 80; 4.5 AEROSOL RESPIRATORY (INHALATION) at 13:15

## 2021-01-20 RX ADMIN — GLYCOPYRROLATE 0.4 MG: 0.2 INJECTION, SOLUTION INTRAMUSCULAR; INTRAVENOUS at 09:42

## 2021-01-20 RX ADMIN — Medication 3 MG: at 09:42

## 2021-01-20 RX ADMIN — ROCURONIUM BROMIDE 10 MG: 10 INJECTION INTRAVENOUS at 08:37

## 2021-01-20 RX ADMIN — OXCARBAZEPINE 300 MG: 300 TABLET, FILM COATED ORAL at 20:59

## 2021-01-20 RX ADMIN — SUFENTANIL CITRATE 10 MCG: 50 INJECTION EPIDURAL; INTRAVENOUS at 08:20

## 2021-01-20 RX ADMIN — SUFENTANIL CITRATE 10 MCG: 50 INJECTION EPIDURAL; INTRAVENOUS at 08:17

## 2021-01-20 RX ADMIN — KETAMINE HYDROCHLORIDE 50 MG: 50 INJECTION, SOLUTION INTRAMUSCULAR; INTRAVENOUS at 07:43

## 2021-01-20 RX ADMIN — KETAMINE HYDROCHLORIDE 10 MG: 50 INJECTION, SOLUTION INTRAMUSCULAR; INTRAVENOUS at 09:00

## 2021-01-20 RX ADMIN — TRIHEXYPHENIDYL HYDROCHLORIDE 2 MG: 2 TABLET ORAL at 12:56

## 2021-01-20 RX ADMIN — ARIPIPRAZOLE 20 MG: 10 TABLET ORAL at 12:56

## 2021-01-20 RX ADMIN — SUFENTANIL CITRATE 10 MCG: 50 INJECTION EPIDURAL; INTRAVENOUS at 07:41

## 2021-01-20 RX ADMIN — METFORMIN HYDROCHLORIDE 500 MG: 500 TABLET ORAL at 17:56

## 2021-01-20 RX ADMIN — OXYBUTYNIN CHLORIDE 5 MG: 5 TABLET ORAL at 20:59

## 2021-01-20 RX ADMIN — FAMOTIDINE 20 MG: 20 TABLET, FILM COATED ORAL at 12:56

## 2021-01-20 RX ADMIN — OXYBUTYNIN CHLORIDE 5 MG: 5 TABLET ORAL at 12:56

## 2021-01-20 RX ADMIN — LIDOCAINE HYDROCHLORIDE 100 MG: 20 INJECTION, SOLUTION EPIDURAL; INFILTRATION; INTRACAUDAL; PERINEURAL at 07:43

## 2021-01-20 RX ADMIN — EPHEDRINE SULFATE 10 MG: 50 INJECTION INTRAVENOUS at 07:59

## 2021-01-20 RX ADMIN — DEXAMETHASONE SODIUM PHOSPHATE 4 MG: 4 INJECTION, SOLUTION INTRAMUSCULAR; INTRAVENOUS at 07:49

## 2021-01-20 RX ADMIN — MONTELUKAST SODIUM 10 MG: 10 TABLET, FILM COATED ORAL at 20:59

## 2021-01-20 RX ADMIN — Medication 2 G: at 07:49

## 2021-01-20 RX ADMIN — BUDESONIDE AND FORMOTEROL FUMARATE DIHYDRATE 2 PUFF: 80; 4.5 AEROSOL RESPIRATORY (INHALATION) at 20:39

## 2021-01-20 RX ADMIN — EPHEDRINE SULFATE 10 MG: 50 INJECTION INTRAVENOUS at 07:57

## 2021-01-20 RX ADMIN — OXYCODONE HYDROCHLORIDE 20 MG: 20 TABLET, FILM COATED, EXTENDED RELEASE ORAL at 06:29

## 2021-01-20 RX ADMIN — CEFAZOLIN SODIUM 1 G: 1 INJECTION, SOLUTION INTRAVENOUS at 16:14

## 2021-01-20 RX ADMIN — SODIUM CHLORIDE, PRESERVATIVE FREE 3 ML: 5 INJECTION INTRAVENOUS at 20:59

## 2021-01-20 RX ADMIN — HYDROMORPHONE HYDROCHLORIDE 1 MG: 1 INJECTION, SOLUTION INTRAMUSCULAR; INTRAVENOUS; SUBCUTANEOUS at 11:31

## 2021-01-20 RX ADMIN — OXCARBAZEPINE 300 MG: 300 TABLET, FILM COATED ORAL at 16:14

## 2021-01-20 RX ADMIN — SODIUM CHLORIDE, POTASSIUM CHLORIDE, SODIUM LACTATE AND CALCIUM CHLORIDE 100 ML/HR: 600; 310; 30; 20 INJECTION, SOLUTION INTRAVENOUS at 07:09

## 2021-01-20 RX ADMIN — ESCITALOPRAM 5 MG: 10 TABLET, FILM COATED ORAL at 12:56

## 2021-01-20 RX ADMIN — ONDANSETRON HYDROCHLORIDE 4 MG: 2 SOLUTION INTRAMUSCULAR; INTRAVENOUS at 07:49

## 2021-01-20 RX ADMIN — OXCARBAZEPINE 300 MG: 300 TABLET, FILM COATED ORAL at 12:56

## 2021-01-20 RX ADMIN — SUFENTANIL CITRATE 10 MCG: 50 INJECTION EPIDURAL; INTRAVENOUS at 08:08

## 2021-01-20 RX ADMIN — GABAPENTIN 800 MG: 400 CAPSULE ORAL at 21:00

## 2021-01-20 RX ADMIN — SODIUM CHLORIDE 75 ML/HR: 9 INJECTION, SOLUTION INTRAVENOUS at 11:31

## 2021-01-20 RX ADMIN — TRIHEXYPHENIDYL HYDROCHLORIDE 2 MG: 2 TABLET ORAL at 16:14

## 2021-01-20 RX ADMIN — PROPOFOL 120 MG: 10 INJECTION, EMULSION INTRAVENOUS at 07:43

## 2021-01-20 RX ADMIN — GABAPENTIN 800 MG: 400 CAPSULE ORAL at 16:14

## 2021-01-20 RX ADMIN — FAMOTIDINE 20 MG: 20 TABLET, FILM COATED ORAL at 21:00

## 2021-01-20 RX ADMIN — TRIHEXYPHENIDYL HYDROCHLORIDE 2 MG: 2 TABLET ORAL at 20:59

## 2021-01-20 RX ADMIN — MIRTAZAPINE 7.5 MG: 15 TABLET, FILM COATED ORAL at 20:59

## 2021-01-20 RX ADMIN — FLUCONAZOLE 100 MG: 200 TABLET ORAL at 21:00

## 2021-01-20 RX ADMIN — ESMOLOL HYDROCHLORIDE 20 MG: 10 INJECTION, SOLUTION INTRAVENOUS at 10:07

## 2021-01-20 RX ADMIN — OXYCODONE HYDROCHLORIDE AND ACETAMINOPHEN 1 TABLET: 7.5; 325 TABLET ORAL at 19:20

## 2021-01-20 RX ADMIN — ROCURONIUM BROMIDE 50 MG: 10 INJECTION INTRAVENOUS at 07:43

## 2021-01-20 RX ADMIN — LEVOTHYROXINE SODIUM 75 MCG: 75 TABLET ORAL at 12:57

## 2021-01-20 RX ADMIN — DONEPEZIL HYDROCHLORIDE 5 MG: 5 TABLET, FILM COATED ORAL at 21:00

## 2021-01-20 RX ADMIN — SODIUM CHLORIDE, POTASSIUM CHLORIDE, SODIUM LACTATE AND CALCIUM CHLORIDE: 600; 310; 30; 20 INJECTION, SOLUTION INTRAVENOUS at 09:04

## 2021-01-20 RX ADMIN — SODIUM CHLORIDE, PRESERVATIVE FREE 3 ML: 5 INJECTION INTRAVENOUS at 12:57

## 2021-01-21 ENCOUNTER — TRANSCRIBE ORDERS (OUTPATIENT)
Dept: ADMINISTRATIVE | Facility: HOSPITAL | Age: 66
End: 2021-01-21

## 2021-01-21 VITALS
SYSTOLIC BLOOD PRESSURE: 140 MMHG | OXYGEN SATURATION: 99 % | TEMPERATURE: 98 F | RESPIRATION RATE: 16 BRPM | HEART RATE: 65 BPM | DIASTOLIC BLOOD PRESSURE: 76 MMHG

## 2021-01-21 DIAGNOSIS — Z11.59 SCREENING FOR VIRAL DISEASE: Primary | ICD-10-CM

## 2021-01-21 LAB
ANION GAP SERPL CALCULATED.3IONS-SCNC: 10 MMOL/L (ref 5–15)
BASOPHILS # BLD AUTO: 0.01 10*3/MM3 (ref 0–0.2)
BASOPHILS NFR BLD AUTO: 0.1 % (ref 0–1.5)
BUN SERPL-MCNC: 11 MG/DL (ref 8–23)
BUN/CREAT SERPL: 17.2 (ref 7–25)
CALCIUM SPEC-SCNC: 8.7 MG/DL (ref 8.6–10.5)
CHLORIDE SERPL-SCNC: 97 MMOL/L (ref 98–107)
CO2 SERPL-SCNC: 26 MMOL/L (ref 22–29)
CREAT SERPL-MCNC: 0.64 MG/DL (ref 0.57–1)
DEPRECATED RDW RBC AUTO: 39.7 FL (ref 37–54)
EOSINOPHIL # BLD AUTO: 0.01 10*3/MM3 (ref 0–0.4)
EOSINOPHIL NFR BLD AUTO: 0.1 % (ref 0.3–6.2)
ERYTHROCYTE [DISTWIDTH] IN BLOOD BY AUTOMATED COUNT: 13 % (ref 12.3–15.4)
GFR SERPL CREATININE-BSD FRML MDRD: 93 ML/MIN/1.73
GLUCOSE SERPL-MCNC: 147 MG/DL (ref 65–99)
HCT VFR BLD AUTO: 29.5 % (ref 34–46.6)
HGB BLD-MCNC: 9.6 G/DL (ref 12–15.9)
IMM GRANULOCYTES # BLD AUTO: 0.03 10*3/MM3 (ref 0–0.05)
IMM GRANULOCYTES NFR BLD AUTO: 0.3 % (ref 0–0.5)
LYMPHOCYTES # BLD AUTO: 1.5 10*3/MM3 (ref 0.7–3.1)
LYMPHOCYTES NFR BLD AUTO: 14.4 % (ref 19.6–45.3)
MCH RBC QN AUTO: 27.4 PG (ref 26.6–33)
MCHC RBC AUTO-ENTMCNC: 32.5 G/DL (ref 31.5–35.7)
MCV RBC AUTO: 84.3 FL (ref 79–97)
MONOCYTES # BLD AUTO: 0.45 10*3/MM3 (ref 0.1–0.9)
MONOCYTES NFR BLD AUTO: 4.3 % (ref 5–12)
NEUTROPHILS NFR BLD AUTO: 8.41 10*3/MM3 (ref 1.7–7)
NEUTROPHILS NFR BLD AUTO: 80.8 % (ref 42.7–76)
NRBC BLD AUTO-RTO: 0 /100 WBC (ref 0–0.2)
PLATELET # BLD AUTO: 235 10*3/MM3 (ref 140–450)
PMV BLD AUTO: 8.7 FL (ref 6–12)
POTASSIUM SERPL-SCNC: 4.3 MMOL/L (ref 3.5–5.2)
RBC # BLD AUTO: 3.5 10*6/MM3 (ref 3.77–5.28)
SODIUM SERPL-SCNC: 133 MMOL/L (ref 136–145)
WBC # BLD AUTO: 10.41 10*3/MM3 (ref 3.4–10.8)

## 2021-01-21 PROCEDURE — 97535 SELF CARE MNGMENT TRAINING: CPT

## 2021-01-21 PROCEDURE — 94799 UNLISTED PULMONARY SVC/PX: CPT

## 2021-01-21 PROCEDURE — 85025 COMPLETE CBC W/AUTO DIFF WBC: CPT | Performed by: ORTHOPAEDIC SURGERY

## 2021-01-21 PROCEDURE — 80048 BASIC METABOLIC PNL TOTAL CA: CPT | Performed by: ORTHOPAEDIC SURGERY

## 2021-01-21 PROCEDURE — 25010000002 CEFAZOLIN 1-4 GM/50ML-% SOLUTION: Performed by: ORTHOPAEDIC SURGERY

## 2021-01-21 PROCEDURE — 97116 GAIT TRAINING THERAPY: CPT

## 2021-01-21 RX ORDER — OXYCODONE AND ACETAMINOPHEN 7.5; 325 MG/1; MG/1
1 TABLET ORAL EVERY 4 HOURS PRN
Qty: 42 TABLET | Refills: 0 | Status: ON HOLD | OUTPATIENT
Start: 2021-01-28 | End: 2021-01-27

## 2021-01-21 RX ORDER — OXYCODONE AND ACETAMINOPHEN 7.5; 325 MG/1; MG/1
1 TABLET ORAL EVERY 4 HOURS PRN
Qty: 42 TABLET | Refills: 0 | Status: SHIPPED | OUTPATIENT
Start: 2021-01-21 | End: 2021-01-31 | Stop reason: HOSPADM

## 2021-01-21 RX ADMIN — BUDESONIDE AND FORMOTEROL FUMARATE DIHYDRATE 2 PUFF: 80; 4.5 AEROSOL RESPIRATORY (INHALATION) at 06:45

## 2021-01-21 RX ADMIN — OXCARBAZEPINE 300 MG: 300 TABLET, FILM COATED ORAL at 08:56

## 2021-01-21 RX ADMIN — TIZANIDINE 4 MG: 4 TABLET ORAL at 01:13

## 2021-01-21 RX ADMIN — METFORMIN HYDROCHLORIDE 500 MG: 500 TABLET ORAL at 08:56

## 2021-01-21 RX ADMIN — TRIHEXYPHENIDYL HYDROCHLORIDE 2 MG: 2 TABLET ORAL at 08:55

## 2021-01-21 RX ADMIN — OXYBUTYNIN CHLORIDE 5 MG: 5 TABLET ORAL at 08:56

## 2021-01-21 RX ADMIN — CEFAZOLIN SODIUM 1 G: 1 INJECTION, SOLUTION INTRAVENOUS at 01:07

## 2021-01-21 RX ADMIN — SODIUM CHLORIDE 75 ML/HR: 9 INJECTION, SOLUTION INTRAVENOUS at 01:12

## 2021-01-21 RX ADMIN — CEFAZOLIN SODIUM 1 G: 1 INJECTION, SOLUTION INTRAVENOUS at 08:56

## 2021-01-21 RX ADMIN — SODIUM CHLORIDE, PRESERVATIVE FREE 3 ML: 5 INJECTION INTRAVENOUS at 08:56

## 2021-01-21 RX ADMIN — ESCITALOPRAM 5 MG: 10 TABLET, FILM COATED ORAL at 08:56

## 2021-01-21 RX ADMIN — OXYCODONE HYDROCHLORIDE AND ACETAMINOPHEN 1 TABLET: 7.5; 325 TABLET ORAL at 11:22

## 2021-01-21 RX ADMIN — FAMOTIDINE 20 MG: 20 TABLET, FILM COATED ORAL at 08:56

## 2021-01-21 RX ADMIN — OXYCODONE HYDROCHLORIDE AND ACETAMINOPHEN 1 TABLET: 7.5; 325 TABLET ORAL at 06:05

## 2021-01-21 RX ADMIN — ARIPIPRAZOLE 20 MG: 10 TABLET ORAL at 08:55

## 2021-01-21 RX ADMIN — GABAPENTIN 800 MG: 400 CAPSULE ORAL at 06:05

## 2021-01-21 RX ADMIN — OXYCODONE HYDROCHLORIDE AND ACETAMINOPHEN 1 TABLET: 7.5; 325 TABLET ORAL at 01:13

## 2021-01-21 RX ADMIN — LEVOTHYROXINE SODIUM 75 MCG: 75 TABLET ORAL at 06:05

## 2021-01-22 ENCOUNTER — READMISSION MANAGEMENT (OUTPATIENT)
Dept: CALL CENTER | Facility: HOSPITAL | Age: 66
End: 2021-01-22

## 2021-01-25 ENCOUNTER — LAB (OUTPATIENT)
Dept: LAB | Facility: HOSPITAL | Age: 66
End: 2021-01-25

## 2021-01-25 LAB — SARS-COV-2 ORF1AB RESP QL NAA+PROBE: NOT DETECTED

## 2021-01-25 PROCEDURE — C9803 HOPD COVID-19 SPEC COLLECT: HCPCS | Performed by: ORTHOPAEDIC SURGERY

## 2021-01-25 PROCEDURE — U0004 COV-19 TEST NON-CDC HGH THRU: HCPCS | Performed by: ORTHOPAEDIC SURGERY

## 2021-01-26 ENCOUNTER — READMISSION MANAGEMENT (OUTPATIENT)
Dept: CALL CENTER | Facility: HOSPITAL | Age: 66
End: 2021-01-26

## 2021-01-27 ENCOUNTER — READMISSION MANAGEMENT (OUTPATIENT)
Dept: CALL CENTER | Facility: HOSPITAL | Age: 66
End: 2021-01-27

## 2021-01-27 ENCOUNTER — HOSPITAL ENCOUNTER (INPATIENT)
Facility: HOSPITAL | Age: 66
LOS: 4 days | Discharge: HOME HEALTH | End: 2021-01-31
Attending: ORTHOPAEDIC SURGERY | Admitting: ORTHOPAEDIC SURGERY

## 2021-01-27 ENCOUNTER — APPOINTMENT (OUTPATIENT)
Dept: GENERAL RADIOLOGY | Facility: HOSPITAL | Age: 66
End: 2021-01-27

## 2021-01-27 ENCOUNTER — ANESTHESIA EVENT (OUTPATIENT)
Dept: PERIOP | Facility: HOSPITAL | Age: 66
End: 2021-01-27

## 2021-01-27 ENCOUNTER — ANESTHESIA (OUTPATIENT)
Dept: PERIOP | Facility: HOSPITAL | Age: 66
End: 2021-01-27

## 2021-01-27 DIAGNOSIS — Z74.09 IMPAIRED MOBILITY: ICD-10-CM

## 2021-01-27 DIAGNOSIS — M48.061 SPINAL STENOSIS OF LUMBAR REGION WITHOUT NEUROGENIC CLAUDICATION: Primary | ICD-10-CM

## 2021-01-27 DIAGNOSIS — Z78.9 DECREASED ACTIVITIES OF DAILY LIVING (ADL): ICD-10-CM

## 2021-01-27 PROBLEM — M51.36 DDD (DEGENERATIVE DISC DISEASE), LUMBAR: Status: ACTIVE | Noted: 2021-01-27

## 2021-01-27 PROBLEM — E11.65 TYPE 2 DIABETES MELLITUS WITH HYPERGLYCEMIA, WITHOUT LONG-TERM CURRENT USE OF INSULIN: Status: ACTIVE | Noted: 2019-01-23

## 2021-01-27 PROBLEM — M51.369 DDD (DEGENERATIVE DISC DISEASE), LUMBAR: Status: ACTIVE | Noted: 2021-01-27

## 2021-01-27 PROBLEM — K59.03 DRUG-INDUCED CONSTIPATION: Status: ACTIVE | Noted: 2021-01-27

## 2021-01-27 LAB
GLUCOSE BLDC GLUCOMTR-MCNC: 122 MG/DL (ref 70–130)
GLUCOSE BLDC GLUCOMTR-MCNC: 98 MG/DL (ref 70–130)

## 2021-01-27 PROCEDURE — 25010000002 SUCCINYLCHOLINE PER 20 MG: Performed by: NURSE ANESTHETIST, CERTIFIED REGISTERED

## 2021-01-27 PROCEDURE — 25010000002 ONDANSETRON PER 1 MG: Performed by: NURSE ANESTHETIST, CERTIFIED REGISTERED

## 2021-01-27 PROCEDURE — C1713 ANCHOR/SCREW BN/BN,TIS/BN: HCPCS | Performed by: ORTHOPAEDIC SURGERY

## 2021-01-27 PROCEDURE — 25010000002 MIDAZOLAM PER 1 MG: Performed by: ANESTHESIOLOGY

## 2021-01-27 PROCEDURE — 72100 X-RAY EXAM L-S SPINE 2/3 VWS: CPT

## 2021-01-27 PROCEDURE — 25010000002 DEXAMETHASONE PER 1 MG: Performed by: ANESTHESIOLOGY

## 2021-01-27 PROCEDURE — 0ST20ZZ RESECTION OF LUMBAR VERTEBRAL DISC, OPEN APPROACH: ICD-10-PCS | Performed by: ORTHOPAEDIC SURGERY

## 2021-01-27 PROCEDURE — 94640 AIRWAY INHALATION TREATMENT: CPT

## 2021-01-27 PROCEDURE — 25010000002 HYDROMORPHONE PER 4 MG: Performed by: ANESTHESIOLOGY

## 2021-01-27 PROCEDURE — 25010000002 CEFAZOLIN PER 500 MG: Performed by: ORTHOPAEDIC SURGERY

## 2021-01-27 PROCEDURE — 94799 UNLISTED PULMONARY SVC/PX: CPT

## 2021-01-27 PROCEDURE — 25010000002 PROPOFOL 10 MG/ML EMULSION: Performed by: NURSE ANESTHETIST, CERTIFIED REGISTERED

## 2021-01-27 PROCEDURE — 25010000002 CEFAZOLIN 1-4 GM/50ML-% SOLUTION: Performed by: ORTHOPAEDIC SURGERY

## 2021-01-27 PROCEDURE — 76000 FLUOROSCOPY <1 HR PHYS/QHP: CPT

## 2021-01-27 PROCEDURE — 25010000002 FENTANYL CITRATE (PF) 100 MCG/2ML SOLUTION: Performed by: ANESTHESIOLOGY

## 2021-01-27 PROCEDURE — 25010000003 HYDROMORPHONE 1 MG/ML SOLUTION: Performed by: ORTHOPAEDIC SURGERY

## 2021-01-27 PROCEDURE — 82962 GLUCOSE BLOOD TEST: CPT

## 2021-01-27 PROCEDURE — 25010000002 HYDROMORPHONE 1 MG/ML SOLUTION

## 2021-01-27 PROCEDURE — 0SG10A0 FUSION OF 2 OR MORE LUMBAR VERTEBRAL JOINTS WITH INTERBODY FUSION DEVICE, ANTERIOR APPROACH, ANTERIOR COLUMN, OPEN APPROACH: ICD-10-PCS | Performed by: ORTHOPAEDIC SURGERY

## 2021-01-27 DEVICE — ALLOGRFT FLD BIOBURST 1ML: Type: IMPLANTABLE DEVICE | Site: SPINE LUMBAR | Status: FUNCTIONAL

## 2021-01-27 DEVICE — KWIRE LAT INTECH 340MM NS: Type: IMPLANTABLE DEVICE | Site: SPINE LUMBAR | Status: FUNCTIONAL

## 2021-01-27 DEVICE — KT HEMOST ABS SURGIFOAM PORCN 1GRAM: Type: IMPLANTABLE DEVICE | Site: SPINE LUMBAR | Status: FUNCTIONAL

## 2021-01-27 DEVICE — BONE FILLER VOID NANOSS BIOACTIVE 3D 20CC: Type: IMPLANTABLE DEVICE | Site: SPINE LUMBAR | Status: FUNCTIONAL

## 2021-01-27 DEVICE — DISC SHIM FOUNDATION RATCHETING: Type: IMPLANTABLE DEVICE | Site: SPINE LUMBAR | Status: FUNCTIONAL

## 2021-01-27 DEVICE — CAGE LIF FOUNDATION F3D 8DEG 22X50X10MM: Type: IMPLANTABLE DEVICE | Site: SPINE LUMBAR | Status: FUNCTIONAL

## 2021-01-27 DEVICE — SCRW ORO LAT PLT SYS 5.5X30MM: Type: IMPLANTABLE DEVICE | Site: SPINE LUMBAR | Status: FUNCTIONAL

## 2021-01-27 DEVICE — PLT LAT ORO SYS 2/SCRW 15X18MM: Type: IMPLANTABLE DEVICE | Site: SPINE LUMBAR | Status: FUNCTIONAL

## 2021-01-27 DEVICE — SCRW ORO LAT PLT SYS 5.5X45MM: Type: IMPLANTABLE DEVICE | Site: SPINE LUMBAR | Status: FUNCTIONAL

## 2021-01-27 RX ORDER — ONDANSETRON 2 MG/ML
4 INJECTION INTRAMUSCULAR; INTRAVENOUS EVERY 6 HOURS PRN
Status: DISCONTINUED | OUTPATIENT
Start: 2021-01-27 | End: 2021-01-27 | Stop reason: SDUPTHER

## 2021-01-27 RX ORDER — LEVOTHYROXINE SODIUM 0.07 MG/1
75 TABLET ORAL
Status: DISCONTINUED | OUTPATIENT
Start: 2021-01-27 | End: 2021-01-31 | Stop reason: HOSPADM

## 2021-01-27 RX ORDER — SODIUM CHLORIDE 9 MG/ML
75 INJECTION, SOLUTION INTRAVENOUS CONTINUOUS
Status: DISPENSED | OUTPATIENT
Start: 2021-01-27 | End: 2021-01-29

## 2021-01-27 RX ORDER — DONEPEZIL HYDROCHLORIDE 5 MG/1
5 TABLET, FILM COATED ORAL NIGHTLY
Status: DISCONTINUED | OUTPATIENT
Start: 2021-01-27 | End: 2021-01-31 | Stop reason: HOSPADM

## 2021-01-27 RX ORDER — ARIPIPRAZOLE 10 MG/1
20 TABLET ORAL DAILY
Status: DISCONTINUED | OUTPATIENT
Start: 2021-01-27 | End: 2021-01-31 | Stop reason: HOSPADM

## 2021-01-27 RX ORDER — SODIUM CHLORIDE, SODIUM LACTATE, POTASSIUM CHLORIDE, CALCIUM CHLORIDE 600; 310; 30; 20 MG/100ML; MG/100ML; MG/100ML; MG/100ML
1000 INJECTION, SOLUTION INTRAVENOUS CONTINUOUS
Status: DISCONTINUED | OUTPATIENT
Start: 2021-01-27 | End: 2021-01-27 | Stop reason: SDUPTHER

## 2021-01-27 RX ORDER — OXYCODONE HCL 20 MG/1
20 TABLET, FILM COATED, EXTENDED RELEASE ORAL ONCE
Status: COMPLETED | OUTPATIENT
Start: 2021-01-27 | End: 2021-01-27

## 2021-01-27 RX ORDER — SUFENTANIL CITRATE 50 UG/ML
INJECTION EPIDURAL; INTRAVENOUS AS NEEDED
Status: DISCONTINUED | OUTPATIENT
Start: 2021-01-27 | End: 2021-01-27 | Stop reason: SURG

## 2021-01-27 RX ORDER — OXYBUTYNIN CHLORIDE 5 MG/1
5 TABLET ORAL 2 TIMES DAILY
Status: DISCONTINUED | OUTPATIENT
Start: 2021-01-27 | End: 2021-01-31 | Stop reason: HOSPADM

## 2021-01-27 RX ORDER — SODIUM CHLORIDE 0.9 % (FLUSH) 0.9 %
10 SYRINGE (ML) INJECTION AS NEEDED
Status: DISCONTINUED | OUTPATIENT
Start: 2021-01-27 | End: 2021-01-31 | Stop reason: HOSPADM

## 2021-01-27 RX ORDER — OXYCODONE AND ACETAMINOPHEN 10; 325 MG/1; MG/1
1 TABLET ORAL ONCE AS NEEDED
Status: DISCONTINUED | OUTPATIENT
Start: 2021-01-27 | End: 2021-01-27 | Stop reason: HOSPADM

## 2021-01-27 RX ORDER — FAMOTIDINE 10 MG/ML
20 INJECTION, SOLUTION INTRAVENOUS EVERY 12 HOURS SCHEDULED
Status: DISCONTINUED | OUTPATIENT
Start: 2021-01-27 | End: 2021-01-31 | Stop reason: HOSPADM

## 2021-01-27 RX ORDER — POLYETHYLENE GLYCOL 3350 17 G/17G
17 POWDER, FOR SOLUTION ORAL 2 TIMES DAILY
Status: DISCONTINUED | OUTPATIENT
Start: 2021-01-27 | End: 2021-01-31 | Stop reason: HOSPADM

## 2021-01-27 RX ORDER — BUDESONIDE AND FORMOTEROL FUMARATE DIHYDRATE 80; 4.5 UG/1; UG/1
2 AEROSOL RESPIRATORY (INHALATION)
Status: DISCONTINUED | OUTPATIENT
Start: 2021-01-27 | End: 2021-01-31 | Stop reason: HOSPADM

## 2021-01-27 RX ORDER — ONDANSETRON 2 MG/ML
4 INJECTION INTRAMUSCULAR; INTRAVENOUS AS NEEDED
Status: DISCONTINUED | OUTPATIENT
Start: 2021-01-27 | End: 2021-01-27 | Stop reason: HOSPADM

## 2021-01-27 RX ORDER — SODIUM CHLORIDE 9 MG/ML
75 INJECTION, SOLUTION INTRAVENOUS CONTINUOUS
Status: DISCONTINUED | OUTPATIENT
Start: 2021-01-27 | End: 2021-01-27 | Stop reason: SDUPTHER

## 2021-01-27 RX ORDER — MIRTAZAPINE 15 MG/1
7.5 TABLET, FILM COATED ORAL NIGHTLY
Status: DISCONTINUED | OUTPATIENT
Start: 2021-01-27 | End: 2021-01-31 | Stop reason: HOSPADM

## 2021-01-27 RX ORDER — OXCARBAZEPINE 300 MG/1
300 TABLET, FILM COATED ORAL 3 TIMES DAILY
Status: DISCONTINUED | OUTPATIENT
Start: 2021-01-27 | End: 2021-01-31 | Stop reason: HOSPADM

## 2021-01-27 RX ORDER — DEXAMETHASONE SODIUM PHOSPHATE 4 MG/ML
4 INJECTION, SOLUTION INTRA-ARTICULAR; INTRALESIONAL; INTRAMUSCULAR; INTRAVENOUS; SOFT TISSUE ONCE AS NEEDED
Status: COMPLETED | OUTPATIENT
Start: 2021-01-27 | End: 2021-01-27

## 2021-01-27 RX ORDER — LIDOCAINE HYDROCHLORIDE 10 MG/ML
0.5 INJECTION, SOLUTION EPIDURAL; INFILTRATION; INTRACAUDAL; PERINEURAL ONCE AS NEEDED
Status: DISCONTINUED | OUTPATIENT
Start: 2021-01-27 | End: 2021-01-27 | Stop reason: HOSPADM

## 2021-01-27 RX ORDER — OXYCODONE AND ACETAMINOPHEN 7.5; 325 MG/1; MG/1
1 TABLET ORAL EVERY 4 HOURS PRN
Status: DISCONTINUED | OUTPATIENT
Start: 2021-01-27 | End: 2021-01-27

## 2021-01-27 RX ORDER — SODIUM CHLORIDE 0.9 % (FLUSH) 0.9 %
3-10 SYRINGE (ML) INJECTION AS NEEDED
Status: DISCONTINUED | OUTPATIENT
Start: 2021-01-27 | End: 2021-01-27 | Stop reason: HOSPADM

## 2021-01-27 RX ORDER — IBUPROFEN 600 MG/1
600 TABLET ORAL ONCE AS NEEDED
Status: DISCONTINUED | OUTPATIENT
Start: 2021-01-27 | End: 2021-01-27 | Stop reason: HOSPADM

## 2021-01-27 RX ORDER — GABAPENTIN 400 MG/1
800 CAPSULE ORAL EVERY 8 HOURS SCHEDULED
Status: DISCONTINUED | OUTPATIENT
Start: 2021-01-27 | End: 2021-01-31 | Stop reason: HOSPADM

## 2021-01-27 RX ORDER — ONDANSETRON 4 MG/1
4 TABLET, FILM COATED ORAL EVERY 6 HOURS PRN
Status: DISCONTINUED | OUTPATIENT
Start: 2021-01-27 | End: 2021-01-31 | Stop reason: HOSPADM

## 2021-01-27 RX ORDER — LABETALOL HYDROCHLORIDE 5 MG/ML
5 INJECTION, SOLUTION INTRAVENOUS
Status: DISCONTINUED | OUTPATIENT
Start: 2021-01-27 | End: 2021-01-27 | Stop reason: HOSPADM

## 2021-01-27 RX ORDER — NALOXONE HCL 0.4 MG/ML
0.04 VIAL (ML) INJECTION AS NEEDED
Status: DISCONTINUED | OUTPATIENT
Start: 2021-01-27 | End: 2021-01-27 | Stop reason: HOSPADM

## 2021-01-27 RX ORDER — EPHEDRINE SULFATE 50 MG/ML
INJECTION, SOLUTION INTRAVENOUS AS NEEDED
Status: DISCONTINUED | OUTPATIENT
Start: 2021-01-27 | End: 2021-01-27 | Stop reason: SURG

## 2021-01-27 RX ORDER — ONDANSETRON 2 MG/ML
4 INJECTION INTRAMUSCULAR; INTRAVENOUS EVERY 6 HOURS PRN
Status: DISCONTINUED | OUTPATIENT
Start: 2021-01-27 | End: 2021-01-31 | Stop reason: HOSPADM

## 2021-01-27 RX ORDER — SODIUM CHLORIDE 0.9 % (FLUSH) 0.9 %
3 SYRINGE (ML) INJECTION EVERY 12 HOURS SCHEDULED
Status: DISCONTINUED | OUTPATIENT
Start: 2021-01-27 | End: 2021-01-27 | Stop reason: HOSPADM

## 2021-01-27 RX ORDER — PROPOFOL 10 MG/ML
VIAL (ML) INTRAVENOUS AS NEEDED
Status: DISCONTINUED | OUTPATIENT
Start: 2021-01-27 | End: 2021-01-27 | Stop reason: SURG

## 2021-01-27 RX ORDER — SODIUM CHLORIDE 0.9 % (FLUSH) 0.9 %
10 SYRINGE (ML) INJECTION AS NEEDED
Status: DISCONTINUED | OUTPATIENT
Start: 2021-01-27 | End: 2021-01-27 | Stop reason: HOSPADM

## 2021-01-27 RX ORDER — FLUTICASONE PROPIONATE 50 MCG
2 SPRAY, SUSPENSION (ML) NASAL DAILY PRN
Status: DISCONTINUED | OUTPATIENT
Start: 2021-01-27 | End: 2021-01-31 | Stop reason: HOSPADM

## 2021-01-27 RX ORDER — DIPHENHYDRAMINE HCL 25 MG
25 CAPSULE ORAL NIGHTLY PRN
Status: DISCONTINUED | OUTPATIENT
Start: 2021-01-27 | End: 2021-01-31 | Stop reason: HOSPADM

## 2021-01-27 RX ORDER — ACETAMINOPHEN 500 MG
1000 TABLET ORAL ONCE
Status: COMPLETED | OUTPATIENT
Start: 2021-01-27 | End: 2021-01-27

## 2021-01-27 RX ORDER — MAGNESIUM HYDROXIDE 1200 MG/15ML
LIQUID ORAL AS NEEDED
Status: DISCONTINUED | OUTPATIENT
Start: 2021-01-27 | End: 2021-01-27 | Stop reason: HOSPADM

## 2021-01-27 RX ORDER — OXCARBAZEPINE 300 MG/1
300 TABLET, FILM COATED ORAL EVERY 8 HOURS SCHEDULED
Status: DISCONTINUED | OUTPATIENT
Start: 2021-01-27 | End: 2021-01-27

## 2021-01-27 RX ORDER — FLUMAZENIL 0.1 MG/ML
0.2 INJECTION INTRAVENOUS AS NEEDED
Status: DISCONTINUED | OUTPATIENT
Start: 2021-01-27 | End: 2021-01-27 | Stop reason: HOSPADM

## 2021-01-27 RX ORDER — OXYCODONE AND ACETAMINOPHEN 10; 325 MG/1; MG/1
1 TABLET ORAL EVERY 4 HOURS PRN
Status: DISCONTINUED | OUTPATIENT
Start: 2021-01-27 | End: 2021-01-31 | Stop reason: HOSPADM

## 2021-01-27 RX ORDER — HYDROXYZINE HYDROCHLORIDE 25 MG/1
25 TABLET, FILM COATED ORAL 2 TIMES DAILY
Status: DISCONTINUED | OUTPATIENT
Start: 2021-01-27 | End: 2021-01-31 | Stop reason: HOSPADM

## 2021-01-27 RX ORDER — BUPIVACAINE HCL/0.9 % NACL/PF 0.1 %
2 PLASTIC BAG, INJECTION (ML) EPIDURAL ONCE
Status: COMPLETED | OUTPATIENT
Start: 2021-01-27 | End: 2021-01-27

## 2021-01-27 RX ORDER — SODIUM CHLORIDE 0.9 % (FLUSH) 0.9 %
3 SYRINGE (ML) INJECTION AS NEEDED
Status: DISCONTINUED | OUTPATIENT
Start: 2021-01-27 | End: 2021-01-27 | Stop reason: HOSPADM

## 2021-01-27 RX ORDER — HYDROMORPHONE HYDROCHLORIDE 1 MG/ML
0.5 INJECTION, SOLUTION INTRAMUSCULAR; INTRAVENOUS; SUBCUTANEOUS ONCE
Status: COMPLETED | OUTPATIENT
Start: 2021-01-27 | End: 2021-01-27

## 2021-01-27 RX ORDER — SODIUM CHLORIDE 0.9 % (FLUSH) 0.9 %
3 SYRINGE (ML) INJECTION EVERY 12 HOURS SCHEDULED
Status: DISCONTINUED | OUTPATIENT
Start: 2021-01-27 | End: 2021-01-31 | Stop reason: HOSPADM

## 2021-01-27 RX ORDER — HYDROMORPHONE HYDROCHLORIDE 1 MG/ML
0.5 INJECTION, SOLUTION INTRAMUSCULAR; INTRAVENOUS; SUBCUTANEOUS
Status: DISCONTINUED | OUTPATIENT
Start: 2021-01-27 | End: 2021-01-27 | Stop reason: HOSPADM

## 2021-01-27 RX ORDER — TIZANIDINE 4 MG/1
4 TABLET ORAL EVERY 8 HOURS PRN
Status: DISCONTINUED | OUTPATIENT
Start: 2021-01-27 | End: 2021-01-31 | Stop reason: HOSPADM

## 2021-01-27 RX ORDER — ONDANSETRON 2 MG/ML
INJECTION INTRAMUSCULAR; INTRAVENOUS AS NEEDED
Status: DISCONTINUED | OUTPATIENT
Start: 2021-01-27 | End: 2021-01-27 | Stop reason: SURG

## 2021-01-27 RX ORDER — SUMATRIPTAN 50 MG/1
100 TABLET, FILM COATED ORAL DAILY PRN
Status: DISCONTINUED | OUTPATIENT
Start: 2021-01-27 | End: 2021-01-31 | Stop reason: HOSPADM

## 2021-01-27 RX ORDER — OXYCODONE AND ACETAMINOPHEN 7.5; 325 MG/1; MG/1
1 TABLET ORAL ONCE
Status: COMPLETED | OUTPATIENT
Start: 2021-01-27 | End: 2021-01-27

## 2021-01-27 RX ORDER — MIDAZOLAM HYDROCHLORIDE 1 MG/ML
0.5 INJECTION INTRAMUSCULAR; INTRAVENOUS
Status: DISCONTINUED | OUTPATIENT
Start: 2021-01-27 | End: 2021-01-27 | Stop reason: HOSPADM

## 2021-01-27 RX ORDER — ESCITALOPRAM OXALATE 10 MG/1
5 TABLET ORAL DAILY
Status: DISCONTINUED | OUTPATIENT
Start: 2021-01-27 | End: 2021-01-31 | Stop reason: HOSPADM

## 2021-01-27 RX ORDER — SUCCINYLCHOLINE CHLORIDE 20 MG/ML
INJECTION INTRAMUSCULAR; INTRAVENOUS AS NEEDED
Status: DISCONTINUED | OUTPATIENT
Start: 2021-01-27 | End: 2021-01-27 | Stop reason: SURG

## 2021-01-27 RX ORDER — LIDOCAINE HYDROCHLORIDE 10 MG/ML
0.5 INJECTION, SOLUTION EPIDURAL; INFILTRATION; INTRACAUDAL; PERINEURAL ONCE AS NEEDED
Status: DISCONTINUED | OUTPATIENT
Start: 2021-01-27 | End: 2021-01-27

## 2021-01-27 RX ORDER — CEFAZOLIN SODIUM 1 G/50ML
1 INJECTION, SOLUTION INTRAVENOUS EVERY 8 HOURS
Status: COMPLETED | OUTPATIENT
Start: 2021-01-27 | End: 2021-01-28

## 2021-01-27 RX ORDER — ROCURONIUM BROMIDE 10 MG/ML
INJECTION, SOLUTION INTRAVENOUS AS NEEDED
Status: DISCONTINUED | OUTPATIENT
Start: 2021-01-27 | End: 2021-01-27 | Stop reason: SURG

## 2021-01-27 RX ORDER — MIDAZOLAM HYDROCHLORIDE 1 MG/ML
1 INJECTION INTRAMUSCULAR; INTRAVENOUS
Status: DISCONTINUED | OUTPATIENT
Start: 2021-01-27 | End: 2021-01-27 | Stop reason: HOSPADM

## 2021-01-27 RX ORDER — MONTELUKAST SODIUM 10 MG/1
10 TABLET ORAL NIGHTLY
Status: DISCONTINUED | OUTPATIENT
Start: 2021-01-27 | End: 2021-01-31 | Stop reason: HOSPADM

## 2021-01-27 RX ORDER — LIDOCAINE HYDROCHLORIDE 40 MG/ML
SOLUTION TOPICAL AS NEEDED
Status: DISCONTINUED | OUTPATIENT
Start: 2021-01-27 | End: 2021-01-27 | Stop reason: SURG

## 2021-01-27 RX ORDER — LIDOCAINE HYDROCHLORIDE 20 MG/ML
INJECTION, SOLUTION EPIDURAL; INFILTRATION; INTRACAUDAL; PERINEURAL AS NEEDED
Status: DISCONTINUED | OUTPATIENT
Start: 2021-01-27 | End: 2021-01-27 | Stop reason: SURG

## 2021-01-27 RX ORDER — FENTANYL CITRATE 50 UG/ML
25 INJECTION, SOLUTION INTRAMUSCULAR; INTRAVENOUS
Status: DISCONTINUED | OUTPATIENT
Start: 2021-01-27 | End: 2021-01-27 | Stop reason: HOSPADM

## 2021-01-27 RX ORDER — FAMOTIDINE 20 MG/1
20 TABLET, FILM COATED ORAL EVERY 12 HOURS SCHEDULED
Status: DISCONTINUED | OUTPATIENT
Start: 2021-01-27 | End: 2021-01-31 | Stop reason: HOSPADM

## 2021-01-27 RX ORDER — SODIUM CHLORIDE, SODIUM LACTATE, POTASSIUM CHLORIDE, CALCIUM CHLORIDE 600; 310; 30; 20 MG/100ML; MG/100ML; MG/100ML; MG/100ML
100 INJECTION, SOLUTION INTRAVENOUS CONTINUOUS
Status: DISCONTINUED | OUTPATIENT
Start: 2021-01-27 | End: 2021-01-27 | Stop reason: SDUPTHER

## 2021-01-27 RX ORDER — TRIHEXYPHENIDYL HYDROCHLORIDE 2 MG/1
1 TABLET ORAL 3 TIMES DAILY
Status: DISCONTINUED | OUTPATIENT
Start: 2021-01-27 | End: 2021-01-31 | Stop reason: HOSPADM

## 2021-01-27 RX ADMIN — HYDROMORPHONE HYDROCHLORIDE 0.5 MG: 1 INJECTION, SOLUTION INTRAMUSCULAR; INTRAVENOUS; SUBCUTANEOUS at 11:01

## 2021-01-27 RX ADMIN — MIRTAZAPINE 7.5 MG: 15 TABLET, FILM COATED ORAL at 22:16

## 2021-01-27 RX ADMIN — ONDANSETRON HYDROCHLORIDE 4 MG: 2 SOLUTION INTRAMUSCULAR; INTRAVENOUS at 13:37

## 2021-01-27 RX ADMIN — SUCCINYLCHOLINE CHLORIDE 120 MG: 20 INJECTION, SOLUTION INTRAMUSCULAR; INTRAVENOUS at 11:16

## 2021-01-27 RX ADMIN — FAMOTIDINE 20 MG: 20 TABLET, FILM COATED ORAL at 22:15

## 2021-01-27 RX ADMIN — OXYBUTYNIN CHLORIDE 5 MG: 5 TABLET ORAL at 22:15

## 2021-01-27 RX ADMIN — TIZANIDINE 4 MG: 4 TABLET ORAL at 17:48

## 2021-01-27 RX ADMIN — TRIHEXYPHENIDYL HYDROCHLORIDE 1 MG: 2 TABLET ORAL at 16:48

## 2021-01-27 RX ADMIN — ARIPIPRAZOLE 20 MG: 10 TABLET ORAL at 16:48

## 2021-01-27 RX ADMIN — LIDOCAINE HYDROCHLORIDE 100 MG: 20 INJECTION, SOLUTION EPIDURAL; INFILTRATION; INTRACAUDAL; PERINEURAL at 11:16

## 2021-01-27 RX ADMIN — SODIUM CHLORIDE, POTASSIUM CHLORIDE, SODIUM LACTATE AND CALCIUM CHLORIDE 1000 ML: 600; 310; 30; 20 INJECTION, SOLUTION INTRAVENOUS at 09:18

## 2021-01-27 RX ADMIN — HYDROMORPHONE HYDROCHLORIDE 0.5 MG: 1 INJECTION, SOLUTION INTRAMUSCULAR; INTRAVENOUS; SUBCUTANEOUS at 14:32

## 2021-01-27 RX ADMIN — ROCURONIUM BROMIDE 5 MG: 10 INJECTION INTRAVENOUS at 11:16

## 2021-01-27 RX ADMIN — HYDROMORPHONE HYDROCHLORIDE 0.5 MG: 1 INJECTION, SOLUTION INTRAMUSCULAR; INTRAVENOUS; SUBCUTANEOUS at 14:42

## 2021-01-27 RX ADMIN — HYDROXYZINE HYDROCHLORIDE 25 MG: 25 TABLET ORAL at 22:15

## 2021-01-27 RX ADMIN — SODIUM CHLORIDE, POTASSIUM CHLORIDE, SODIUM LACTATE AND CALCIUM CHLORIDE: 600; 310; 30; 20 INJECTION, SOLUTION INTRAVENOUS at 12:35

## 2021-01-27 RX ADMIN — ESCITALOPRAM 5 MG: 10 TABLET, FILM COATED ORAL at 16:48

## 2021-01-27 RX ADMIN — HYDROMORPHONE HYDROCHLORIDE 1 MG: 1 INJECTION, SOLUTION INTRAMUSCULAR; INTRAVENOUS; SUBCUTANEOUS at 15:56

## 2021-01-27 RX ADMIN — OXYCODONE HYDROCHLORIDE AND ACETAMINOPHEN 1 TABLET: 7.5; 325 TABLET ORAL at 18:11

## 2021-01-27 RX ADMIN — SODIUM CHLORIDE 75 ML/HR: 9 INJECTION, SOLUTION INTRAVENOUS at 15:57

## 2021-01-27 RX ADMIN — TRIHEXYPHENIDYL HYDROCHLORIDE 1 MG: 2 TABLET ORAL at 22:16

## 2021-01-27 RX ADMIN — GABAPENTIN 800 MG: 400 CAPSULE ORAL at 16:48

## 2021-01-27 RX ADMIN — POLYETHYLENE GLYCOL (3350) 17 G: 17 POWDER, FOR SOLUTION ORAL at 22:23

## 2021-01-27 RX ADMIN — OXYCODONE HYDROCHLORIDE 20 MG: 20 TABLET, FILM COATED, EXTENDED RELEASE ORAL at 10:47

## 2021-01-27 RX ADMIN — SUFENTANIL CITRATE 10 MCG: 50 INJECTION EPIDURAL; INTRAVENOUS at 11:16

## 2021-01-27 RX ADMIN — Medication 2 G: at 11:29

## 2021-01-27 RX ADMIN — MONTELUKAST SODIUM 10 MG: 10 TABLET, FILM COATED ORAL at 22:15

## 2021-01-27 RX ADMIN — LIDOCAINE HYDROCHLORIDE 1 EACH: 40 SOLUTION TOPICAL at 11:20

## 2021-01-27 RX ADMIN — ACETAMINOPHEN 1000 MG: 500 TABLET, FILM COATED ORAL at 11:02

## 2021-01-27 RX ADMIN — SODIUM CHLORIDE, PRESERVATIVE FREE 3 ML: 5 INJECTION INTRAVENOUS at 22:23

## 2021-01-27 RX ADMIN — BUDESONIDE AND FORMOTEROL FUMARATE DIHYDRATE 2 PUFF: 80; 4.5 AEROSOL RESPIRATORY (INHALATION) at 19:02

## 2021-01-27 RX ADMIN — HYDROMORPHONE HYDROCHLORIDE 1 MG: 1 INJECTION, SOLUTION INTRAMUSCULAR; INTRAVENOUS; SUBCUTANEOUS at 22:09

## 2021-01-27 RX ADMIN — FENTANYL CITRATE 25 MCG: 50 INJECTION, SOLUTION INTRAMUSCULAR; INTRAVENOUS at 14:35

## 2021-01-27 RX ADMIN — SUFENTANIL CITRATE 5 MCG: 50 INJECTION EPIDURAL; INTRAVENOUS at 13:03

## 2021-01-27 RX ADMIN — SUFENTANIL CITRATE 5 MCG: 50 INJECTION EPIDURAL; INTRAVENOUS at 12:52

## 2021-01-27 RX ADMIN — FENTANYL CITRATE 25 MCG: 50 INJECTION, SOLUTION INTRAMUSCULAR; INTRAVENOUS at 14:25

## 2021-01-27 RX ADMIN — LEVOTHYROXINE SODIUM 75 MCG: 75 TABLET ORAL at 16:48

## 2021-01-27 RX ADMIN — EPHEDRINE SULFATE 10 MG: 50 INJECTION INTRAVENOUS at 11:24

## 2021-01-27 RX ADMIN — HYDROMORPHONE HYDROCHLORIDE 0.5 MG: 1 INJECTION, SOLUTION INTRAMUSCULAR; INTRAVENOUS; SUBCUTANEOUS at 14:22

## 2021-01-27 RX ADMIN — MIDAZOLAM 1 MG: 1 INJECTION INTRAMUSCULAR; INTRAVENOUS at 11:02

## 2021-01-27 RX ADMIN — DEXAMETHASONE SODIUM PHOSPHATE 4 MG: 4 INJECTION, SOLUTION INTRAMUSCULAR; INTRAVENOUS at 11:01

## 2021-01-27 RX ADMIN — OXCARBAZEPINE 300 MG: 300 TABLET, FILM COATED ORAL at 16:48

## 2021-01-27 RX ADMIN — SUFENTANIL CITRATE 20 MCG: 50 INJECTION EPIDURAL; INTRAVENOUS at 11:58

## 2021-01-27 RX ADMIN — METFORMIN HYDROCHLORIDE 500 MG: 500 TABLET ORAL at 17:48

## 2021-01-27 RX ADMIN — DONEPEZIL HYDROCHLORIDE 5 MG: 5 TABLET, FILM COATED ORAL at 22:15

## 2021-01-27 RX ADMIN — CEFAZOLIN SODIUM 1 G: 1 INJECTION, SOLUTION INTRAVENOUS at 17:48

## 2021-01-27 RX ADMIN — OXYCODONE HYDROCHLORIDE AND ACETAMINOPHEN 1 TABLET: 7.5; 325 TABLET ORAL at 16:48

## 2021-01-27 RX ADMIN — OXCARBAZEPINE 300 MG: 300 TABLET, FILM COATED ORAL at 22:15

## 2021-01-27 RX ADMIN — PROPOFOL 130 MG: 10 INJECTION, EMULSION INTRAVENOUS at 11:16

## 2021-01-27 RX ADMIN — GABAPENTIN 800 MG: 400 CAPSULE ORAL at 22:16

## 2021-01-28 PROBLEM — D50.9 IRON DEFICIENCY ANEMIA, UNSPECIFIED: Chronic | Status: ACTIVE | Noted: 2021-01-28

## 2021-01-28 LAB
ANION GAP SERPL CALCULATED.3IONS-SCNC: 8 MMOL/L (ref 5–15)
BASOPHILS # BLD AUTO: 0.01 10*3/MM3 (ref 0–0.2)
BASOPHILS NFR BLD AUTO: 0.1 % (ref 0–1.5)
BUN SERPL-MCNC: 8 MG/DL (ref 8–23)
BUN/CREAT SERPL: 14 (ref 7–25)
CALCIUM SPEC-SCNC: 8.4 MG/DL (ref 8.6–10.5)
CHLORIDE SERPL-SCNC: 97 MMOL/L (ref 98–107)
CO2 SERPL-SCNC: 27 MMOL/L (ref 22–29)
CREAT SERPL-MCNC: 0.57 MG/DL (ref 0.57–1)
DEPRECATED RDW RBC AUTO: 38.1 FL (ref 37–54)
EOSINOPHIL # BLD AUTO: 0.03 10*3/MM3 (ref 0–0.4)
EOSINOPHIL NFR BLD AUTO: 0.3 % (ref 0.3–6.2)
ERYTHROCYTE [DISTWIDTH] IN BLOOD BY AUTOMATED COUNT: 12.6 % (ref 12.3–15.4)
FOLATE SERPL-MCNC: 11.4 NG/ML (ref 4.78–24.2)
GFR SERPL CREATININE-BSD FRML MDRD: 106 ML/MIN/1.73
GLUCOSE SERPL-MCNC: 122 MG/DL (ref 65–99)
HCT VFR BLD AUTO: 25.2 % (ref 34–46.6)
HGB BLD-MCNC: 8.5 G/DL (ref 12–15.9)
IMM GRANULOCYTES # BLD AUTO: 0.06 10*3/MM3 (ref 0–0.05)
IMM GRANULOCYTES NFR BLD AUTO: 0.7 % (ref 0–0.5)
IRON 24H UR-MRATE: 25 MCG/DL (ref 37–145)
IRON SATN MFR SERPL: 9 % (ref 20–50)
LYMPHOCYTES # BLD AUTO: 1.45 10*3/MM3 (ref 0.7–3.1)
LYMPHOCYTES NFR BLD AUTO: 16.7 % (ref 19.6–45.3)
MCH RBC QN AUTO: 28.1 PG (ref 26.6–33)
MCHC RBC AUTO-ENTMCNC: 33.7 G/DL (ref 31.5–35.7)
MCV RBC AUTO: 83.2 FL (ref 79–97)
MONOCYTES # BLD AUTO: 0.53 10*3/MM3 (ref 0.1–0.9)
MONOCYTES NFR BLD AUTO: 6.1 % (ref 5–12)
NEUTROPHILS NFR BLD AUTO: 6.58 10*3/MM3 (ref 1.7–7)
NEUTROPHILS NFR BLD AUTO: 76.1 % (ref 42.7–76)
NRBC BLD AUTO-RTO: 0 /100 WBC (ref 0–0.2)
PLATELET # BLD AUTO: 326 10*3/MM3 (ref 140–450)
PMV BLD AUTO: 8.5 FL (ref 6–12)
POTASSIUM SERPL-SCNC: 4.5 MMOL/L (ref 3.5–5.2)
RBC # BLD AUTO: 3.03 10*6/MM3 (ref 3.77–5.28)
SODIUM SERPL-SCNC: 132 MMOL/L (ref 136–145)
TIBC SERPL-MCNC: 264 MCG/DL (ref 298–536)
TRANSFERRIN SERPL-MCNC: 177 MG/DL (ref 200–360)
VIT B12 BLD-MCNC: 1481 PG/ML (ref 211–946)
WBC # BLD AUTO: 8.66 10*3/MM3 (ref 3.4–10.8)

## 2021-01-28 PROCEDURE — 97162 PT EVAL MOD COMPLEX 30 MIN: CPT | Performed by: PHYSICAL THERAPIST

## 2021-01-28 PROCEDURE — 25010000002 CEFAZOLIN 1-4 GM/50ML-% SOLUTION: Performed by: ORTHOPAEDIC SURGERY

## 2021-01-28 PROCEDURE — 82607 VITAMIN B-12: CPT | Performed by: FAMILY MEDICINE

## 2021-01-28 PROCEDURE — 80048 BASIC METABOLIC PNL TOTAL CA: CPT | Performed by: ORTHOPAEDIC SURGERY

## 2021-01-28 PROCEDURE — 85025 COMPLETE CBC W/AUTO DIFF WBC: CPT | Performed by: ORTHOPAEDIC SURGERY

## 2021-01-28 PROCEDURE — 84466 ASSAY OF TRANSFERRIN: CPT | Performed by: FAMILY MEDICINE

## 2021-01-28 PROCEDURE — 82746 ASSAY OF FOLIC ACID SERUM: CPT | Performed by: FAMILY MEDICINE

## 2021-01-28 PROCEDURE — 94799 UNLISTED PULMONARY SVC/PX: CPT

## 2021-01-28 PROCEDURE — 97166 OT EVAL MOD COMPLEX 45 MIN: CPT | Performed by: OCCUPATIONAL THERAPIST

## 2021-01-28 PROCEDURE — 83540 ASSAY OF IRON: CPT | Performed by: FAMILY MEDICINE

## 2021-01-28 PROCEDURE — 97116 GAIT TRAINING THERAPY: CPT

## 2021-01-28 RX ORDER — FERROUS SULFATE 325(65) MG
325 TABLET ORAL
Status: DISCONTINUED | OUTPATIENT
Start: 2021-01-28 | End: 2021-01-31 | Stop reason: HOSPADM

## 2021-01-28 RX ADMIN — POLYETHYLENE GLYCOL (3350) 17 G: 17 POWDER, FOR SOLUTION ORAL at 08:32

## 2021-01-28 RX ADMIN — TIZANIDINE 4 MG: 4 TABLET ORAL at 03:09

## 2021-01-28 RX ADMIN — POLYETHYLENE GLYCOL (3350) 17 G: 17 POWDER, FOR SOLUTION ORAL at 20:56

## 2021-01-28 RX ADMIN — BUDESONIDE AND FORMOTEROL FUMARATE DIHYDRATE 2 PUFF: 80; 4.5 AEROSOL RESPIRATORY (INHALATION) at 21:26

## 2021-01-28 RX ADMIN — CEFAZOLIN SODIUM 1 G: 1 INJECTION, SOLUTION INTRAVENOUS at 10:05

## 2021-01-28 RX ADMIN — MIRTAZAPINE 7.5 MG: 15 TABLET, FILM COATED ORAL at 20:58

## 2021-01-28 RX ADMIN — OXCARBAZEPINE 300 MG: 300 TABLET, FILM COATED ORAL at 20:55

## 2021-01-28 RX ADMIN — FERROUS SULFATE TAB 325 MG (65 MG ELEMENTAL FE) 325 MG: 325 (65 FE) TAB at 08:32

## 2021-01-28 RX ADMIN — OXCARBAZEPINE 300 MG: 300 TABLET, FILM COATED ORAL at 08:32

## 2021-01-28 RX ADMIN — METFORMIN HYDROCHLORIDE 500 MG: 500 TABLET ORAL at 17:22

## 2021-01-28 RX ADMIN — OXYCODONE HYDROCHLORIDE AND ACETAMINOPHEN 1 TABLET: 10; 325 TABLET ORAL at 12:41

## 2021-01-28 RX ADMIN — TIZANIDINE 4 MG: 4 TABLET ORAL at 20:54

## 2021-01-28 RX ADMIN — MONTELUKAST SODIUM 10 MG: 10 TABLET, FILM COATED ORAL at 20:55

## 2021-01-28 RX ADMIN — TRIHEXYPHENIDYL HYDROCHLORIDE 1 MG: 2 TABLET ORAL at 15:09

## 2021-01-28 RX ADMIN — GABAPENTIN 800 MG: 400 CAPSULE ORAL at 21:00

## 2021-01-28 RX ADMIN — OXCARBAZEPINE 300 MG: 300 TABLET, FILM COATED ORAL at 15:10

## 2021-01-28 RX ADMIN — GABAPENTIN 800 MG: 400 CAPSULE ORAL at 06:19

## 2021-01-28 RX ADMIN — LEVOTHYROXINE SODIUM 75 MCG: 75 TABLET ORAL at 06:19

## 2021-01-28 RX ADMIN — BUDESONIDE AND FORMOTEROL FUMARATE DIHYDRATE 2 PUFF: 80; 4.5 AEROSOL RESPIRATORY (INHALATION) at 07:45

## 2021-01-28 RX ADMIN — TRIHEXYPHENIDYL HYDROCHLORIDE 1 MG: 2 TABLET ORAL at 08:33

## 2021-01-28 RX ADMIN — FAMOTIDINE 20 MG: 20 TABLET, FILM COATED ORAL at 08:33

## 2021-01-28 RX ADMIN — SODIUM CHLORIDE, PRESERVATIVE FREE 3 ML: 5 INJECTION INTRAVENOUS at 21:01

## 2021-01-28 RX ADMIN — DONEPEZIL HYDROCHLORIDE 5 MG: 5 TABLET, FILM COATED ORAL at 20:55

## 2021-01-28 RX ADMIN — METFORMIN HYDROCHLORIDE 500 MG: 500 TABLET ORAL at 08:32

## 2021-01-28 RX ADMIN — SODIUM CHLORIDE 75 ML/HR: 9 INJECTION, SOLUTION INTRAVENOUS at 06:19

## 2021-01-28 RX ADMIN — HYDROXYZINE HYDROCHLORIDE 25 MG: 25 TABLET ORAL at 20:54

## 2021-01-28 RX ADMIN — GABAPENTIN 800 MG: 400 CAPSULE ORAL at 15:09

## 2021-01-28 RX ADMIN — TIZANIDINE 4 MG: 4 TABLET ORAL at 12:41

## 2021-01-28 RX ADMIN — OXYCODONE HYDROCHLORIDE AND ACETAMINOPHEN 1 TABLET: 10; 325 TABLET ORAL at 19:47

## 2021-01-28 RX ADMIN — FAMOTIDINE 20 MG: 20 TABLET, FILM COATED ORAL at 20:55

## 2021-01-28 RX ADMIN — OXYBUTYNIN CHLORIDE 5 MG: 5 TABLET ORAL at 20:56

## 2021-01-28 RX ADMIN — TRIHEXYPHENIDYL HYDROCHLORIDE 1 MG: 2 TABLET ORAL at 20:57

## 2021-01-28 RX ADMIN — ARIPIPRAZOLE 20 MG: 10 TABLET ORAL at 10:05

## 2021-01-28 RX ADMIN — CEFAZOLIN SODIUM 1 G: 1 INJECTION, SOLUTION INTRAVENOUS at 03:01

## 2021-01-28 RX ADMIN — OXYBUTYNIN CHLORIDE 5 MG: 5 TABLET ORAL at 08:33

## 2021-01-28 RX ADMIN — OXYCODONE HYDROCHLORIDE AND ACETAMINOPHEN 1 TABLET: 10; 325 TABLET ORAL at 08:31

## 2021-01-28 RX ADMIN — HYDROXYZINE HYDROCHLORIDE 25 MG: 25 TABLET ORAL at 08:33

## 2021-01-28 RX ADMIN — ESCITALOPRAM 5 MG: 10 TABLET, FILM COATED ORAL at 08:32

## 2021-01-28 RX ADMIN — OXYCODONE HYDROCHLORIDE AND ACETAMINOPHEN 1 TABLET: 10; 325 TABLET ORAL at 03:01

## 2021-01-29 ENCOUNTER — ANESTHESIA (OUTPATIENT)
Dept: PERIOP | Facility: HOSPITAL | Age: 66
End: 2021-01-29

## 2021-01-29 ENCOUNTER — ANESTHESIA EVENT (OUTPATIENT)
Dept: PERIOP | Facility: HOSPITAL | Age: 66
End: 2021-01-29

## 2021-01-29 ENCOUNTER — APPOINTMENT (OUTPATIENT)
Dept: GENERAL RADIOLOGY | Facility: HOSPITAL | Age: 66
End: 2021-01-29

## 2021-01-29 LAB
ABO GROUP BLD: NORMAL
BLD GP AB SCN SERPL QL: NEGATIVE
GLUCOSE BLDC GLUCOMTR-MCNC: 187 MG/DL (ref 70–130)
RH BLD: POSITIVE
T&S EXPIRATION DATE: NORMAL

## 2021-01-29 PROCEDURE — 25010000002 CEFAZOLIN PER 500 MG: Performed by: ORTHOPAEDIC SURGERY

## 2021-01-29 PROCEDURE — 82962 GLUCOSE BLOOD TEST: CPT

## 2021-01-29 PROCEDURE — 86900 BLOOD TYPING SEROLOGIC ABO: CPT | Performed by: ANESTHESIOLOGY

## 2021-01-29 PROCEDURE — 25010000003 HYDROMORPHONE 1 MG/ML SOLUTION: Performed by: PHYSICIAN ASSISTANT

## 2021-01-29 PROCEDURE — 72100 X-RAY EXAM L-S SPINE 2/3 VWS: CPT

## 2021-01-29 PROCEDURE — C1713 ANCHOR/SCREW BN/BN,TIS/BN: HCPCS | Performed by: ORTHOPAEDIC SURGERY

## 2021-01-29 PROCEDURE — 25010000002 DEXAMETHASONE PER 1 MG: Performed by: NURSE ANESTHETIST, CERTIFIED REGISTERED

## 2021-01-29 PROCEDURE — 0SG1071 FUSION OF 2 OR MORE LUMBAR VERTEBRAL JOINTS WITH AUTOLOGOUS TISSUE SUBSTITUTE, POSTERIOR APPROACH, POSTERIOR COLUMN, OPEN APPROACH: ICD-10-PCS | Performed by: ORTHOPAEDIC SURGERY

## 2021-01-29 PROCEDURE — 86850 RBC ANTIBODY SCREEN: CPT | Performed by: ANESTHESIOLOGY

## 2021-01-29 PROCEDURE — 76000 FLUOROSCOPY <1 HR PHYS/QHP: CPT

## 2021-01-29 PROCEDURE — 86901 BLOOD TYPING SEROLOGIC RH(D): CPT | Performed by: ANESTHESIOLOGY

## 2021-01-29 PROCEDURE — 25010000002 CEFAZOLIN 1-4 GM/50ML-% SOLUTION: Performed by: PHYSICIAN ASSISTANT

## 2021-01-29 PROCEDURE — 25010000002 PROPOFOL 10 MG/ML EMULSION: Performed by: NURSE ANESTHETIST, CERTIFIED REGISTERED

## 2021-01-29 PROCEDURE — 25010000002 SUCCINYLCHOLINE PER 20 MG: Performed by: NURSE ANESTHETIST, CERTIFIED REGISTERED

## 2021-01-29 PROCEDURE — 0SP004Z REMOVAL OF INTERNAL FIXATION DEVICE FROM LUMBAR VERTEBRAL JOINT, OPEN APPROACH: ICD-10-PCS | Performed by: ORTHOPAEDIC SURGERY

## 2021-01-29 PROCEDURE — 63710000001 DIPHENHYDRAMINE PER 50 MG: Performed by: PHYSICIAN ASSISTANT

## 2021-01-29 PROCEDURE — 0SG3071 FUSION OF LUMBOSACRAL JOINT WITH AUTOLOGOUS TISSUE SUBSTITUTE, POSTERIOR APPROACH, POSTERIOR COLUMN, OPEN APPROACH: ICD-10-PCS | Performed by: ORTHOPAEDIC SURGERY

## 2021-01-29 PROCEDURE — 94799 UNLISTED PULMONARY SVC/PX: CPT

## 2021-01-29 PROCEDURE — 25010000002 FENTANYL CITRATE (PF) 100 MCG/2ML SOLUTION: Performed by: NURSE ANESTHETIST, CERTIFIED REGISTERED

## 2021-01-29 PROCEDURE — 97530 THERAPEUTIC ACTIVITIES: CPT | Performed by: PHYSICAL THERAPIST

## 2021-01-29 PROCEDURE — 97168 OT RE-EVAL EST PLAN CARE: CPT | Performed by: OCCUPATIONAL THERAPIST

## 2021-01-29 PROCEDURE — 25010000002 ONDANSETRON PER 1 MG: Performed by: NURSE ANESTHETIST, CERTIFIED REGISTERED

## 2021-01-29 PROCEDURE — 97164 PT RE-EVAL EST PLAN CARE: CPT | Performed by: PHYSICAL THERAPIST

## 2021-01-29 PROCEDURE — 25010000002 PHENYLEPHRINE HCL 0.8 MG/10ML SOLUTION PREFILLED SYRINGE: Performed by: NURSE ANESTHETIST, CERTIFIED REGISTERED

## 2021-01-29 PROCEDURE — 97535 SELF CARE MNGMENT TRAINING: CPT | Performed by: OCCUPATIONAL THERAPIST

## 2021-01-29 PROCEDURE — C1769 GUIDE WIRE: HCPCS | Performed by: ORTHOPAEDIC SURGERY

## 2021-01-29 PROCEDURE — 25010000002 DEXAMETHASONE PER 1 MG: Performed by: ANESTHESIOLOGY

## 2021-01-29 DEVICE — TI MIS STRAIGHT ROD, 5.5 MM X 300 MM
Type: IMPLANTABLE DEVICE | Site: SPINE LUMBAR | Status: FUNCTIONAL
Brand: INVICTUS

## 2021-01-29 DEVICE — CANNULATED EXTENDED TAB POLYAXIAL REDUCTION SCREW, 6.5 MM X 40 MM
Type: IMPLANTABLE DEVICE | Site: SPINE LUMBAR | Status: FUNCTIONAL
Brand: INVICTUS

## 2021-01-29 DEVICE — CANNULATED EXTENDED TAB POLYAXIAL REDUCTION SCREW, 6.5 MM X 45 MM
Type: IMPLANTABLE DEVICE | Site: SPINE LUMBAR | Status: FUNCTIONAL
Brand: INVICTUS

## 2021-01-29 DEVICE — SET SCREW
Type: IMPLANTABLE DEVICE | Site: SPINE LUMBAR | Status: FUNCTIONAL
Brand: INVICTUS

## 2021-01-29 RX ORDER — CEFAZOLIN SODIUM 1 G/50ML
1 INJECTION, SOLUTION INTRAVENOUS EVERY 8 HOURS
Status: COMPLETED | OUTPATIENT
Start: 2021-01-29 | End: 2021-01-30

## 2021-01-29 RX ORDER — SODIUM CHLORIDE 0.9 % (FLUSH) 0.9 %
3 SYRINGE (ML) INJECTION EVERY 12 HOURS SCHEDULED
Status: DISCONTINUED | OUTPATIENT
Start: 2021-01-29 | End: 2021-01-29 | Stop reason: HOSPADM

## 2021-01-29 RX ORDER — LIDOCAINE HYDROCHLORIDE 20 MG/ML
INJECTION, SOLUTION EPIDURAL; INFILTRATION; INTRACAUDAL; PERINEURAL AS NEEDED
Status: DISCONTINUED | OUTPATIENT
Start: 2021-01-29 | End: 2021-01-29 | Stop reason: SURG

## 2021-01-29 RX ORDER — OXYCODONE HCL 20 MG/1
20 TABLET, FILM COATED, EXTENDED RELEASE ORAL ONCE
Status: COMPLETED | OUTPATIENT
Start: 2021-01-29 | End: 2021-01-29

## 2021-01-29 RX ORDER — OXYCODONE AND ACETAMINOPHEN 10; 325 MG/1; MG/1
1 TABLET ORAL ONCE AS NEEDED
Status: COMPLETED | OUTPATIENT
Start: 2021-01-29 | End: 2021-01-29

## 2021-01-29 RX ORDER — SUCCINYLCHOLINE CHLORIDE 20 MG/ML
INJECTION INTRAMUSCULAR; INTRAVENOUS AS NEEDED
Status: DISCONTINUED | OUTPATIENT
Start: 2021-01-29 | End: 2021-01-29 | Stop reason: SURG

## 2021-01-29 RX ORDER — SODIUM CHLORIDE, SODIUM LACTATE, POTASSIUM CHLORIDE, CALCIUM CHLORIDE 600; 310; 30; 20 MG/100ML; MG/100ML; MG/100ML; MG/100ML
100 INJECTION, SOLUTION INTRAVENOUS CONTINUOUS
Status: DISCONTINUED | OUTPATIENT
Start: 2021-01-29 | End: 2021-01-31 | Stop reason: HOSPADM

## 2021-01-29 RX ORDER — PROPOFOL 10 MG/ML
VIAL (ML) INTRAVENOUS AS NEEDED
Status: DISCONTINUED | OUTPATIENT
Start: 2021-01-29 | End: 2021-01-29 | Stop reason: SURG

## 2021-01-29 RX ORDER — SODIUM CHLORIDE 9 MG/ML
75 INJECTION, SOLUTION INTRAVENOUS CONTINUOUS
Status: DISPENSED | OUTPATIENT
Start: 2021-01-29 | End: 2021-01-30

## 2021-01-29 RX ORDER — DEXAMETHASONE SODIUM PHOSPHATE 4 MG/ML
4 INJECTION, SOLUTION INTRA-ARTICULAR; INTRALESIONAL; INTRAMUSCULAR; INTRAVENOUS; SOFT TISSUE ONCE AS NEEDED
Status: COMPLETED | OUTPATIENT
Start: 2021-01-29 | End: 2021-01-29

## 2021-01-29 RX ORDER — SODIUM CHLORIDE 0.9 % (FLUSH) 0.9 %
3-10 SYRINGE (ML) INJECTION AS NEEDED
Status: DISCONTINUED | OUTPATIENT
Start: 2021-01-29 | End: 2021-01-29 | Stop reason: HOSPADM

## 2021-01-29 RX ORDER — BUPIVACAINE HCL/0.9 % NACL/PF 0.1 %
2 PLASTIC BAG, INJECTION (ML) EPIDURAL ONCE
Status: COMPLETED | OUTPATIENT
Start: 2021-01-29 | End: 2021-01-29

## 2021-01-29 RX ORDER — FLUMAZENIL 0.1 MG/ML
0.2 INJECTION INTRAVENOUS AS NEEDED
Status: DISCONTINUED | OUTPATIENT
Start: 2021-01-29 | End: 2021-01-29 | Stop reason: HOSPADM

## 2021-01-29 RX ORDER — ROCURONIUM BROMIDE 10 MG/ML
INJECTION, SOLUTION INTRAVENOUS AS NEEDED
Status: DISCONTINUED | OUTPATIENT
Start: 2021-01-29 | End: 2021-01-29 | Stop reason: SURG

## 2021-01-29 RX ORDER — FENTANYL CITRATE 50 UG/ML
25 INJECTION, SOLUTION INTRAMUSCULAR; INTRAVENOUS
Status: DISCONTINUED | OUTPATIENT
Start: 2021-01-29 | End: 2021-01-29 | Stop reason: HOSPADM

## 2021-01-29 RX ORDER — FENTANYL CITRATE 50 UG/ML
INJECTION, SOLUTION INTRAMUSCULAR; INTRAVENOUS AS NEEDED
Status: DISCONTINUED | OUTPATIENT
Start: 2021-01-29 | End: 2021-01-29 | Stop reason: SURG

## 2021-01-29 RX ORDER — KETAMINE HYDROCHLORIDE 50 MG/ML
INJECTION, SOLUTION, CONCENTRATE INTRAMUSCULAR; INTRAVENOUS AS NEEDED
Status: DISCONTINUED | OUTPATIENT
Start: 2021-01-29 | End: 2021-01-29 | Stop reason: SURG

## 2021-01-29 RX ORDER — LIDOCAINE HYDROCHLORIDE 10 MG/ML
0.5 INJECTION, SOLUTION EPIDURAL; INFILTRATION; INTRACAUDAL; PERINEURAL ONCE AS NEEDED
Status: DISCONTINUED | OUTPATIENT
Start: 2021-01-29 | End: 2021-01-29 | Stop reason: HOSPADM

## 2021-01-29 RX ORDER — MAGNESIUM HYDROXIDE 1200 MG/15ML
LIQUID ORAL AS NEEDED
Status: DISCONTINUED | OUTPATIENT
Start: 2021-01-29 | End: 2021-01-29 | Stop reason: HOSPADM

## 2021-01-29 RX ORDER — SODIUM CHLORIDE, SODIUM LACTATE, POTASSIUM CHLORIDE, CALCIUM CHLORIDE 600; 310; 30; 20 MG/100ML; MG/100ML; MG/100ML; MG/100ML
1000 INJECTION, SOLUTION INTRAVENOUS CONTINUOUS
Status: DISPENSED | OUTPATIENT
Start: 2021-01-29 | End: 2021-01-31

## 2021-01-29 RX ORDER — NALOXONE HCL 0.4 MG/ML
0.04 VIAL (ML) INJECTION AS NEEDED
Status: DISCONTINUED | OUTPATIENT
Start: 2021-01-29 | End: 2021-01-29 | Stop reason: HOSPADM

## 2021-01-29 RX ORDER — LABETALOL HYDROCHLORIDE 5 MG/ML
5 INJECTION, SOLUTION INTRAVENOUS
Status: DISCONTINUED | OUTPATIENT
Start: 2021-01-29 | End: 2021-01-29 | Stop reason: HOSPADM

## 2021-01-29 RX ORDER — ACETAMINOPHEN 500 MG
1000 TABLET ORAL ONCE
Status: DISCONTINUED | OUTPATIENT
Start: 2021-01-29 | End: 2021-01-29 | Stop reason: HOSPADM

## 2021-01-29 RX ORDER — LIDOCAINE HYDROCHLORIDE 10 MG/ML
0.5 INJECTION, SOLUTION EPIDURAL; INFILTRATION; INTRACAUDAL; PERINEURAL ONCE AS NEEDED
Status: DISCONTINUED | OUTPATIENT
Start: 2021-01-29 | End: 2021-01-29 | Stop reason: SDUPTHER

## 2021-01-29 RX ORDER — PHENYLEPHRINE HCL IN 0.9% NACL 0.8MG/10ML
SYRINGE (ML) INTRAVENOUS AS NEEDED
Status: DISCONTINUED | OUTPATIENT
Start: 2021-01-29 | End: 2021-01-29 | Stop reason: SURG

## 2021-01-29 RX ORDER — NEOSTIGMINE METHYLSULFATE 5 MG/5 ML
SYRINGE (ML) INTRAVENOUS AS NEEDED
Status: DISCONTINUED | OUTPATIENT
Start: 2021-01-29 | End: 2021-01-29 | Stop reason: SURG

## 2021-01-29 RX ORDER — MIDAZOLAM HYDROCHLORIDE 1 MG/ML
1 INJECTION INTRAMUSCULAR; INTRAVENOUS
Status: DISCONTINUED | OUTPATIENT
Start: 2021-01-29 | End: 2021-01-29 | Stop reason: HOSPADM

## 2021-01-29 RX ORDER — SODIUM CHLORIDE 0.9 % (FLUSH) 0.9 %
10 SYRINGE (ML) INJECTION AS NEEDED
Status: DISCONTINUED | OUTPATIENT
Start: 2021-01-29 | End: 2021-01-29 | Stop reason: HOSPADM

## 2021-01-29 RX ORDER — SODIUM CHLORIDE 0.9 % (FLUSH) 0.9 %
3 SYRINGE (ML) INJECTION AS NEEDED
Status: DISCONTINUED | OUTPATIENT
Start: 2021-01-29 | End: 2021-01-29 | Stop reason: HOSPADM

## 2021-01-29 RX ORDER — DEXAMETHASONE SODIUM PHOSPHATE 4 MG/ML
INJECTION, SOLUTION INTRA-ARTICULAR; INTRALESIONAL; INTRAMUSCULAR; INTRAVENOUS; SOFT TISSUE AS NEEDED
Status: DISCONTINUED | OUTPATIENT
Start: 2021-01-29 | End: 2021-01-29 | Stop reason: SURG

## 2021-01-29 RX ORDER — ONDANSETRON 2 MG/ML
INJECTION INTRAMUSCULAR; INTRAVENOUS AS NEEDED
Status: DISCONTINUED | OUTPATIENT
Start: 2021-01-29 | End: 2021-01-29 | Stop reason: SURG

## 2021-01-29 RX ORDER — SODIUM CHLORIDE, SODIUM LACTATE, POTASSIUM CHLORIDE, CALCIUM CHLORIDE 600; 310; 30; 20 MG/100ML; MG/100ML; MG/100ML; MG/100ML
1000 INJECTION, SOLUTION INTRAVENOUS CONTINUOUS
Status: DISCONTINUED | OUTPATIENT
Start: 2021-01-29 | End: 2021-01-31 | Stop reason: HOSPADM

## 2021-01-29 RX ORDER — MIDAZOLAM HYDROCHLORIDE 1 MG/ML
0.5 INJECTION INTRAMUSCULAR; INTRAVENOUS
Status: DISCONTINUED | OUTPATIENT
Start: 2021-01-29 | End: 2021-01-29 | Stop reason: HOSPADM

## 2021-01-29 RX ORDER — ONDANSETRON 2 MG/ML
4 INJECTION INTRAMUSCULAR; INTRAVENOUS AS NEEDED
Status: DISCONTINUED | OUTPATIENT
Start: 2021-01-29 | End: 2021-01-29 | Stop reason: HOSPADM

## 2021-01-29 RX ORDER — HYDROMORPHONE HYDROCHLORIDE 1 MG/ML
0.5 INJECTION, SOLUTION INTRAMUSCULAR; INTRAVENOUS; SUBCUTANEOUS
Status: DISCONTINUED | OUTPATIENT
Start: 2021-01-29 | End: 2021-01-29 | Stop reason: HOSPADM

## 2021-01-29 RX ORDER — EPHEDRINE SULFATE 50 MG/ML
INJECTION, SOLUTION INTRAVENOUS AS NEEDED
Status: DISCONTINUED | OUTPATIENT
Start: 2021-01-29 | End: 2021-01-29 | Stop reason: SURG

## 2021-01-29 RX ADMIN — ROCURONIUM BROMIDE 10 MG: 10 INJECTION INTRAVENOUS at 09:01

## 2021-01-29 RX ADMIN — DEXAMETHASONE SODIUM PHOSPHATE 8 MG: 4 INJECTION, SOLUTION INTRAMUSCULAR; INTRAVENOUS at 09:01

## 2021-01-29 RX ADMIN — TRIHEXYPHENIDYL HYDROCHLORIDE 1 MG: 2 TABLET ORAL at 16:18

## 2021-01-29 RX ADMIN — OXYCODONE HYDROCHLORIDE AND ACETAMINOPHEN 1 TABLET: 10; 325 TABLET ORAL at 20:39

## 2021-01-29 RX ADMIN — DIPHENHYDRAMINE HYDROCHLORIDE 25 MG: 25 CAPSULE ORAL at 21:42

## 2021-01-29 RX ADMIN — EPHEDRINE SULFATE 10 MG: 50 INJECTION INTRAVENOUS at 09:27

## 2021-01-29 RX ADMIN — TIZANIDINE 4 MG: 4 TABLET ORAL at 15:05

## 2021-01-29 RX ADMIN — OXYCODONE HYDROCHLORIDE AND ACETAMINOPHEN 1 TABLET: 10; 325 TABLET ORAL at 16:17

## 2021-01-29 RX ADMIN — GABAPENTIN 800 MG: 400 CAPSULE ORAL at 21:53

## 2021-01-29 RX ADMIN — TRIHEXYPHENIDYL HYDROCHLORIDE 1 MG: 2 TABLET ORAL at 21:42

## 2021-01-29 RX ADMIN — KETAMINE HYDROCHLORIDE 50 MG: 50 INJECTION, SOLUTION INTRAMUSCULAR; INTRAVENOUS at 09:01

## 2021-01-29 RX ADMIN — DONEPEZIL HYDROCHLORIDE 5 MG: 5 TABLET, FILM COATED ORAL at 21:42

## 2021-01-29 RX ADMIN — Medication 5 MG: at 10:20

## 2021-01-29 RX ADMIN — ROCURONIUM BROMIDE 20 MG: 10 INJECTION INTRAVENOUS at 09:31

## 2021-01-29 RX ADMIN — ONDANSETRON HYDROCHLORIDE 4 MG: 2 SOLUTION INTRAMUSCULAR; INTRAVENOUS at 09:01

## 2021-01-29 RX ADMIN — METFORMIN HYDROCHLORIDE 500 MG: 500 TABLET ORAL at 17:25

## 2021-01-29 RX ADMIN — MIRTAZAPINE 7.5 MG: 15 TABLET, FILM COATED ORAL at 21:41

## 2021-01-29 RX ADMIN — GLYCOPYRROLATE 0.4 MG: 0.2 INJECTION, SOLUTION INTRAMUSCULAR; INTRAVENOUS at 10:20

## 2021-01-29 RX ADMIN — Medication 160 MCG: at 10:09

## 2021-01-29 RX ADMIN — OXYCODONE HYDROCHLORIDE AND ACETAMINOPHEN 1 TABLET: 10; 325 TABLET ORAL at 12:36

## 2021-01-29 RX ADMIN — MONTELUKAST SODIUM 10 MG: 10 TABLET, FILM COATED ORAL at 21:42

## 2021-01-29 RX ADMIN — SODIUM CHLORIDE, POTASSIUM CHLORIDE, SODIUM LACTATE AND CALCIUM CHLORIDE 100 ML/HR: 600; 310; 30; 20 INJECTION, SOLUTION INTRAVENOUS at 08:18

## 2021-01-29 RX ADMIN — PROPOFOL 80 MG: 10 INJECTION, EMULSION INTRAVENOUS at 09:01

## 2021-01-29 RX ADMIN — GABAPENTIN 800 MG: 400 CAPSULE ORAL at 13:21

## 2021-01-29 RX ADMIN — OXCARBAZEPINE 300 MG: 300 TABLET, FILM COATED ORAL at 21:42

## 2021-01-29 RX ADMIN — SODIUM CHLORIDE 75 ML/HR: 9 INJECTION, SOLUTION INTRAVENOUS at 13:23

## 2021-01-29 RX ADMIN — OXYCODONE HYDROCHLORIDE 20 MG: 20 TABLET, FILM COATED, EXTENDED RELEASE ORAL at 08:34

## 2021-01-29 RX ADMIN — OXYBUTYNIN CHLORIDE 5 MG: 5 TABLET ORAL at 21:42

## 2021-01-29 RX ADMIN — BUDESONIDE AND FORMOTEROL FUMARATE DIHYDRATE 2 PUFF: 80; 4.5 AEROSOL RESPIRATORY (INHALATION) at 20:35

## 2021-01-29 RX ADMIN — OXCARBAZEPINE 300 MG: 300 TABLET, FILM COATED ORAL at 16:17

## 2021-01-29 RX ADMIN — KETAMINE HYDROCHLORIDE 15 MG: 50 INJECTION, SOLUTION INTRAMUSCULAR; INTRAVENOUS at 10:20

## 2021-01-29 RX ADMIN — HYDROXYZINE HYDROCHLORIDE 25 MG: 25 TABLET ORAL at 21:42

## 2021-01-29 RX ADMIN — POLYETHYLENE GLYCOL (3350) 17 G: 17 POWDER, FOR SOLUTION ORAL at 21:41

## 2021-01-29 RX ADMIN — LIDOCAINE HYDROCHLORIDE 100 MG: 20 INJECTION, SOLUTION EPIDURAL; INFILTRATION; INTRACAUDAL; PERINEURAL at 09:01

## 2021-01-29 RX ADMIN — SODIUM CHLORIDE, PRESERVATIVE FREE 3 ML: 5 INJECTION INTRAVENOUS at 21:42

## 2021-01-29 RX ADMIN — SODIUM CHLORIDE, POTASSIUM CHLORIDE, SODIUM LACTATE AND CALCIUM CHLORIDE: 600; 310; 30; 20 INJECTION, SOLUTION INTRAVENOUS at 08:56

## 2021-01-29 RX ADMIN — BUDESONIDE AND FORMOTEROL FUMARATE DIHYDRATE 2 PUFF: 80; 4.5 AEROSOL RESPIRATORY (INHALATION) at 06:24

## 2021-01-29 RX ADMIN — CEFAZOLIN SODIUM 1 G: 1 INJECTION, SOLUTION INTRAVENOUS at 17:25

## 2021-01-29 RX ADMIN — OXYCODONE HYDROCHLORIDE AND ACETAMINOPHEN 1 TABLET: 10; 325 TABLET ORAL at 05:55

## 2021-01-29 RX ADMIN — Medication 80 MCG: at 09:16

## 2021-01-29 RX ADMIN — DEXAMETHASONE SODIUM PHOSPHATE 4 MG: 4 INJECTION, SOLUTION INTRAMUSCULAR; INTRAVENOUS at 08:34

## 2021-01-29 RX ADMIN — OXYCODONE HYDROCHLORIDE AND ACETAMINOPHEN 1 TABLET: 10; 325 TABLET ORAL at 01:14

## 2021-01-29 RX ADMIN — HYDROMORPHONE HYDROCHLORIDE 1 MG: 1 INJECTION, SOLUTION INTRAMUSCULAR; INTRAVENOUS; SUBCUTANEOUS at 21:53

## 2021-01-29 RX ADMIN — KETAMINE HYDROCHLORIDE 25 MG: 50 INJECTION, SOLUTION INTRAMUSCULAR; INTRAVENOUS at 09:32

## 2021-01-29 RX ADMIN — Medication 2 G: at 09:15

## 2021-01-29 RX ADMIN — HYDROMORPHONE HYDROCHLORIDE 1 MG: 1 INJECTION, SOLUTION INTRAMUSCULAR; INTRAVENOUS; SUBCUTANEOUS at 13:21

## 2021-01-29 RX ADMIN — SUCCINYLCHOLINE CHLORIDE 80 MG: 20 INJECTION, SOLUTION INTRAMUSCULAR; INTRAVENOUS at 09:01

## 2021-01-29 RX ADMIN — KETAMINE HYDROCHLORIDE 10 MG: 50 INJECTION, SOLUTION INTRAMUSCULAR; INTRAVENOUS at 10:54

## 2021-01-29 RX ADMIN — FAMOTIDINE 20 MG: 20 TABLET, FILM COATED ORAL at 21:42

## 2021-01-29 RX ADMIN — FENTANYL CITRATE 100 MCG: 50 INJECTION, SOLUTION INTRAMUSCULAR; INTRAVENOUS at 09:01

## 2021-01-29 RX ADMIN — SODIUM CHLORIDE, PRESERVATIVE FREE 3 ML: 5 INJECTION INTRAVENOUS at 20:39

## 2021-01-29 RX ADMIN — FAMOTIDINE 20 MG: 10 INJECTION INTRAVENOUS at 08:34

## 2021-01-30 PROCEDURE — 94799 UNLISTED PULMONARY SVC/PX: CPT

## 2021-01-30 PROCEDURE — 97535 SELF CARE MNGMENT TRAINING: CPT

## 2021-01-30 PROCEDURE — 25010000003 HYDROMORPHONE 1 MG/ML SOLUTION: Performed by: PHYSICIAN ASSISTANT

## 2021-01-30 PROCEDURE — 25010000002 CEFAZOLIN 1-4 GM/50ML-% SOLUTION: Performed by: PHYSICIAN ASSISTANT

## 2021-01-30 PROCEDURE — 97116 GAIT TRAINING THERAPY: CPT

## 2021-01-30 RX ORDER — DOCUSATE SODIUM 100 MG/1
100 CAPSULE, LIQUID FILLED ORAL 2 TIMES DAILY
Status: DISCONTINUED | OUTPATIENT
Start: 2021-01-30 | End: 2021-01-31 | Stop reason: HOSPADM

## 2021-01-30 RX ADMIN — CEFAZOLIN SODIUM 1 G: 1 INJECTION, SOLUTION INTRAVENOUS at 00:48

## 2021-01-30 RX ADMIN — SODIUM CHLORIDE, POTASSIUM CHLORIDE, SODIUM LACTATE AND CALCIUM CHLORIDE 1000 ML: 600; 310; 30; 20 INJECTION, SOLUTION INTRAVENOUS at 22:12

## 2021-01-30 RX ADMIN — OXCARBAZEPINE 300 MG: 300 TABLET, FILM COATED ORAL at 09:26

## 2021-01-30 RX ADMIN — POLYETHYLENE GLYCOL (3350) 17 G: 17 POWDER, FOR SOLUTION ORAL at 09:36

## 2021-01-30 RX ADMIN — CEFAZOLIN SODIUM 1 G: 1 INJECTION, SOLUTION INTRAVENOUS at 09:26

## 2021-01-30 RX ADMIN — OXYCODONE HYDROCHLORIDE AND ACETAMINOPHEN 1 TABLET: 10; 325 TABLET ORAL at 18:14

## 2021-01-30 RX ADMIN — OXCARBAZEPINE 300 MG: 300 TABLET, FILM COATED ORAL at 17:13

## 2021-01-30 RX ADMIN — TRIHEXYPHENIDYL HYDROCHLORIDE 1 MG: 2 TABLET ORAL at 21:58

## 2021-01-30 RX ADMIN — GABAPENTIN 800 MG: 400 CAPSULE ORAL at 21:58

## 2021-01-30 RX ADMIN — MONTELUKAST SODIUM 10 MG: 10 TABLET, FILM COATED ORAL at 21:59

## 2021-01-30 RX ADMIN — SODIUM CHLORIDE, PRESERVATIVE FREE 3 ML: 5 INJECTION INTRAVENOUS at 21:59

## 2021-01-30 RX ADMIN — LEVOTHYROXINE SODIUM 75 MCG: 75 TABLET ORAL at 05:13

## 2021-01-30 RX ADMIN — GABAPENTIN 800 MG: 400 CAPSULE ORAL at 05:14

## 2021-01-30 RX ADMIN — DOCUSATE SODIUM 100 MG: 100 CAPSULE ORAL at 21:57

## 2021-01-30 RX ADMIN — DONEPEZIL HYDROCHLORIDE 5 MG: 5 TABLET, FILM COATED ORAL at 21:59

## 2021-01-30 RX ADMIN — ESCITALOPRAM 5 MG: 10 TABLET, FILM COATED ORAL at 09:28

## 2021-01-30 RX ADMIN — HYDROMORPHONE HYDROCHLORIDE 1 MG: 1 INJECTION, SOLUTION INTRAMUSCULAR; INTRAVENOUS; SUBCUTANEOUS at 05:14

## 2021-01-30 RX ADMIN — FERROUS SULFATE TAB 325 MG (65 MG ELEMENTAL FE) 325 MG: 325 (65 FE) TAB at 09:26

## 2021-01-30 RX ADMIN — TIZANIDINE 4 MG: 4 TABLET ORAL at 13:07

## 2021-01-30 RX ADMIN — FAMOTIDINE 20 MG: 20 TABLET, FILM COATED ORAL at 09:27

## 2021-01-30 RX ADMIN — TRIHEXYPHENIDYL HYDROCHLORIDE 1 MG: 2 TABLET ORAL at 09:27

## 2021-01-30 RX ADMIN — MIRTAZAPINE 7.5 MG: 15 TABLET, FILM COATED ORAL at 21:59

## 2021-01-30 RX ADMIN — ARIPIPRAZOLE 20 MG: 10 TABLET ORAL at 09:27

## 2021-01-30 RX ADMIN — METFORMIN HYDROCHLORIDE 500 MG: 500 TABLET ORAL at 09:26

## 2021-01-30 RX ADMIN — METFORMIN HYDROCHLORIDE 500 MG: 500 TABLET ORAL at 22:19

## 2021-01-30 RX ADMIN — HYDROMORPHONE HYDROCHLORIDE 1 MG: 1 INJECTION, SOLUTION INTRAMUSCULAR; INTRAVENOUS; SUBCUTANEOUS at 00:59

## 2021-01-30 RX ADMIN — TIZANIDINE 4 MG: 4 TABLET ORAL at 04:05

## 2021-01-30 RX ADMIN — TIZANIDINE 4 MG: 4 TABLET ORAL at 22:06

## 2021-01-30 RX ADMIN — FAMOTIDINE 20 MG: 20 TABLET, FILM COATED ORAL at 21:59

## 2021-01-30 RX ADMIN — OXYCODONE HYDROCHLORIDE AND ACETAMINOPHEN 1 TABLET: 10; 325 TABLET ORAL at 02:06

## 2021-01-30 RX ADMIN — HYDROXYZINE HYDROCHLORIDE 25 MG: 25 TABLET ORAL at 21:59

## 2021-01-30 RX ADMIN — OXCARBAZEPINE 300 MG: 300 TABLET, FILM COATED ORAL at 21:58

## 2021-01-30 RX ADMIN — POLYETHYLENE GLYCOL (3350) 17 G: 17 POWDER, FOR SOLUTION ORAL at 22:02

## 2021-01-30 RX ADMIN — BUDESONIDE AND FORMOTEROL FUMARATE DIHYDRATE 2 PUFF: 80; 4.5 AEROSOL RESPIRATORY (INHALATION) at 19:21

## 2021-01-30 RX ADMIN — DOCUSATE SODIUM 100 MG: 100 CAPSULE ORAL at 09:36

## 2021-01-30 RX ADMIN — OXYCODONE HYDROCHLORIDE AND ACETAMINOPHEN 1 TABLET: 10; 325 TABLET ORAL at 13:07

## 2021-01-30 RX ADMIN — BUDESONIDE AND FORMOTEROL FUMARATE DIHYDRATE 2 PUFF: 80; 4.5 AEROSOL RESPIRATORY (INHALATION) at 06:48

## 2021-01-30 RX ADMIN — TRIHEXYPHENIDYL HYDROCHLORIDE 1 MG: 2 TABLET ORAL at 17:12

## 2021-01-30 RX ADMIN — OXYCODONE HYDROCHLORIDE AND ACETAMINOPHEN 1 TABLET: 10; 325 TABLET ORAL at 07:01

## 2021-01-30 RX ADMIN — HYDROXYZINE HYDROCHLORIDE 25 MG: 25 TABLET ORAL at 09:27

## 2021-01-30 RX ADMIN — OXYBUTYNIN CHLORIDE 5 MG: 5 TABLET ORAL at 09:26

## 2021-01-30 RX ADMIN — GABAPENTIN 800 MG: 400 CAPSULE ORAL at 14:39

## 2021-01-30 RX ADMIN — OXYBUTYNIN CHLORIDE 5 MG: 5 TABLET ORAL at 21:58

## 2021-01-31 VITALS
TEMPERATURE: 98.4 F | SYSTOLIC BLOOD PRESSURE: 117 MMHG | OXYGEN SATURATION: 93 % | HEART RATE: 64 BPM | RESPIRATION RATE: 18 BRPM | DIASTOLIC BLOOD PRESSURE: 68 MMHG

## 2021-01-31 PROCEDURE — 97535 SELF CARE MNGMENT TRAINING: CPT

## 2021-01-31 PROCEDURE — 97110 THERAPEUTIC EXERCISES: CPT

## 2021-01-31 PROCEDURE — 97116 GAIT TRAINING THERAPY: CPT

## 2021-01-31 RX ORDER — OXYCODONE AND ACETAMINOPHEN 10; 325 MG/1; MG/1
1 TABLET ORAL EVERY 4 HOURS PRN
Qty: 42 TABLET | Refills: 0 | Status: SHIPPED | OUTPATIENT
Start: 2021-01-31 | End: 2022-06-01 | Stop reason: SDUPTHER

## 2021-01-31 RX ORDER — OXYCODONE AND ACETAMINOPHEN 10; 325 MG/1; MG/1
1 TABLET ORAL EVERY 4 HOURS PRN
Qty: 42 TABLET | Refills: 0 | Status: SHIPPED | OUTPATIENT
Start: 2021-01-31 | End: 2021-01-31

## 2021-01-31 RX ORDER — TIZANIDINE 4 MG/1
4 TABLET ORAL EVERY 8 HOURS PRN
Qty: 60 TABLET | Refills: 0 | Status: SHIPPED | OUTPATIENT
Start: 2021-01-31 | End: 2021-01-31

## 2021-01-31 RX ORDER — TIZANIDINE 4 MG/1
4 TABLET ORAL EVERY 8 HOURS PRN
Qty: 60 TABLET | Refills: 0 | Status: SHIPPED | OUTPATIENT
Start: 2021-01-31 | End: 2021-03-02

## 2021-01-31 RX ADMIN — FAMOTIDINE 20 MG: 20 TABLET, FILM COATED ORAL at 10:25

## 2021-01-31 RX ADMIN — OXYBUTYNIN CHLORIDE 5 MG: 5 TABLET ORAL at 10:24

## 2021-01-31 RX ADMIN — HYDROXYZINE HYDROCHLORIDE 25 MG: 25 TABLET ORAL at 10:25

## 2021-01-31 RX ADMIN — GABAPENTIN 800 MG: 400 CAPSULE ORAL at 05:46

## 2021-01-31 RX ADMIN — TRIHEXYPHENIDYL HYDROCHLORIDE 1 MG: 2 TABLET ORAL at 10:24

## 2021-01-31 RX ADMIN — OXCARBAZEPINE 300 MG: 300 TABLET, FILM COATED ORAL at 10:24

## 2021-01-31 RX ADMIN — FERROUS SULFATE TAB 325 MG (65 MG ELEMENTAL FE) 325 MG: 325 (65 FE) TAB at 10:25

## 2021-01-31 RX ADMIN — LEVOTHYROXINE SODIUM 75 MCG: 75 TABLET ORAL at 05:46

## 2021-01-31 RX ADMIN — ESCITALOPRAM 5 MG: 10 TABLET, FILM COATED ORAL at 10:25

## 2021-01-31 RX ADMIN — TIZANIDINE 4 MG: 4 TABLET ORAL at 10:35

## 2021-01-31 RX ADMIN — ARIPIPRAZOLE 20 MG: 10 TABLET ORAL at 10:25

## 2021-01-31 RX ADMIN — OXYCODONE HYDROCHLORIDE AND ACETAMINOPHEN 1 TABLET: 10; 325 TABLET ORAL at 03:33

## 2021-01-31 RX ADMIN — DOCUSATE SODIUM 100 MG: 100 CAPSULE ORAL at 10:23

## 2021-01-31 RX ADMIN — METFORMIN HYDROCHLORIDE 500 MG: 500 TABLET ORAL at 10:25

## 2021-02-01 ENCOUNTER — READMISSION MANAGEMENT (OUTPATIENT)
Dept: CALL CENTER | Facility: HOSPITAL | Age: 66
End: 2021-02-01

## 2021-02-05 ENCOUNTER — READMISSION MANAGEMENT (OUTPATIENT)
Dept: CALL CENTER | Facility: HOSPITAL | Age: 66
End: 2021-02-05

## 2021-02-05 LAB — HBA1C MFR BLD: 6 % (ref 4–6)

## 2021-02-12 ENCOUNTER — READMISSION MANAGEMENT (OUTPATIENT)
Dept: CALL CENTER | Facility: HOSPITAL | Age: 66
End: 2021-02-12

## 2021-02-21 ENCOUNTER — READMISSION MANAGEMENT (OUTPATIENT)
Dept: CALL CENTER | Facility: HOSPITAL | Age: 66
End: 2021-02-21

## 2021-03-01 ENCOUNTER — READMISSION MANAGEMENT (OUTPATIENT)
Dept: CALL CENTER | Facility: HOSPITAL | Age: 66
End: 2021-03-01

## 2021-03-09 RX ORDER — OMEPRAZOLE 40 MG/1
CAPSULE, DELAYED RELEASE ORAL
Qty: 30 CAPSULE | Refills: 5 | Status: SHIPPED | OUTPATIENT
Start: 2021-03-09 | End: 2021-09-27

## 2021-03-16 PROBLEM — E66.01 MORBIDLY OBESE (HCC): Chronic | Status: ACTIVE | Noted: 2020-01-13

## 2021-03-16 PROBLEM — J45.40 MODERATE PERSISTENT ASTHMA WITHOUT COMPLICATION: Chronic | Status: ACTIVE | Noted: 2019-01-23

## 2021-04-01 ENCOUNTER — OFFICE VISIT (OUTPATIENT)
Dept: GASTROENTEROLOGY | Age: 66
End: 2021-04-01
Payer: MEDICARE

## 2021-04-01 VITALS
WEIGHT: 175 LBS | BODY MASS INDEX: 31.01 KG/M2 | DIASTOLIC BLOOD PRESSURE: 80 MMHG | SYSTOLIC BLOOD PRESSURE: 120 MMHG | OXYGEN SATURATION: 99 % | HEIGHT: 63 IN | HEART RATE: 64 BPM

## 2021-04-01 DIAGNOSIS — K21.9 CHRONIC GERD: ICD-10-CM

## 2021-04-01 DIAGNOSIS — R13.19 ESOPHAGEAL DYSPHAGIA: Primary | ICD-10-CM

## 2021-04-01 PROCEDURE — 4040F PNEUMOC VAC/ADMIN/RCVD: CPT | Performed by: NURSE PRACTITIONER

## 2021-04-01 PROCEDURE — 1123F ACP DISCUSS/DSCN MKR DOCD: CPT | Performed by: NURSE PRACTITIONER

## 2021-04-01 PROCEDURE — G8427 DOCREV CUR MEDS BY ELIG CLIN: HCPCS | Performed by: NURSE PRACTITIONER

## 2021-04-01 PROCEDURE — G8400 PT W/DXA NO RESULTS DOC: HCPCS | Performed by: NURSE PRACTITIONER

## 2021-04-01 PROCEDURE — 99214 OFFICE O/P EST MOD 30 MIN: CPT | Performed by: NURSE PRACTITIONER

## 2021-04-01 PROCEDURE — G8417 CALC BMI ABV UP PARAM F/U: HCPCS | Performed by: NURSE PRACTITIONER

## 2021-04-01 PROCEDURE — 3017F COLORECTAL CA SCREEN DOC REV: CPT | Performed by: NURSE PRACTITIONER

## 2021-04-01 PROCEDURE — 1090F PRES/ABSN URINE INCON ASSESS: CPT | Performed by: NURSE PRACTITIONER

## 2021-04-01 PROCEDURE — 1036F TOBACCO NON-USER: CPT | Performed by: NURSE PRACTITIONER

## 2021-04-01 RX ORDER — VITAMIN B COMPLEX
1 CAPSULE ORAL DAILY
COMMUNITY

## 2021-04-01 ASSESSMENT — ENCOUNTER SYMPTOMS
RECTAL PAIN: 0
ABDOMINAL DISTENTION: 0
CONSTIPATION: 0
TROUBLE SWALLOWING: 1
SHORTNESS OF BREATH: 0
ABDOMINAL PAIN: 0
VOMITING: 0
NAUSEA: 0
COUGH: 0
BACK PAIN: 1
CHOKING: 0
BLOOD IN STOOL: 0
ANAL BLEEDING: 0
DIARRHEA: 0

## 2021-04-01 NOTE — PROGRESS NOTES
Subjective:     Patient ID: Anh Huizar is a 77 y.o. female  PCP: Cr Tavares DO  Referring Provider: No ref. provider found    HPI  Patient presents to the office today with the following complaints: Endoscopy      Pt here to schedule EGD. This was scheduled in September 2020, however, she tested (+) for COVID at that time. Pt has had issues with swallowing for several months. She is taking daily PPI, Omeprazole 40 mg. Hx esophageal dilation with improvement in symptoms in the past.  Today, she continues to have issues with swallowing solids and liquids. Foods will come back up. This is occurring regularly. At last visit, pt had c/o diarrhea. Today, she reports this has resolved. Patient denies any lower GI symptoms such as constipation, diarrhea, change in bowel habits, rectal bleeding, and rectal pain. Last EGD 2019 - dilation 54F  Last Colonoscopy 2017 - HP, 5 year recall. Hx of TAP with previous colonoscopies  Positive family history of colon polyps - Sister    Assessment:     1. Esophageal dysphagia    2. Chronic GERD            Plan:   - Continue PPI  - Schedule EGD  Nothing to eat or drink after midnight. No driving for 24 hours after procedure. Bring a  to procedure. No aspirin, NSAIDs, fish oil 5 days before procedure. I have discussed the benefits, alternatives, and risks (including bleeding, perforation and death)  for pursuing Endoscopy (EGD/Colonscopy/EUS/ERCP) with the patient and they are willing to continue. We also discussed the need for anesthesia, IV access, proper dietary changes, medication changes if necessary, and need for bowel prep (if ordered) prior to their Endoscopic procedure. They are aware they must have someone accompany them to their scheduled procedure to drive them home - they agree to the above and are willing to continue. Orders  No orders of the defined types were placed in this encounter.     Medications  No orders of the defined types were placed in this encounter.         Patient History:     Past Medical History:   Diagnosis Date    Asthma     Bipolar disorder ([address]     Chronic back pain     Depression     Diabetes mellitus ([address]     GERD (gastroesophageal reflux disease)     Headache(784.0)     History of adenomatous polyp of colon     Hypothyroidism        Past Surgical History:   Procedure Laterality Date    ANKLE SURGERY Right     APPENDECTOMY      BACK SURGERY  01/27/2021    x3    CHOLECYSTECTOMY      COLONOSCOPY  12/04/2006        COLONOSCOPY  04/10/2012    Dr. Homer Prader: tubular adenoma (5 yr)    ENDOSCOPY, COLON, DIAGNOSTIC      about 2 yrs ago    HYSTERECTOMY  1980    total; due to endometreosis    [place] COLONOSCOPY FLX DX W/COLLJ SPEC WHEN PFRMD N/A 07/31/2017    Dr Rosana Monsivais-aborted distal to splenic flexure due to poor prep    [place] COLONOSCOPY W/BIOPSY SINGLE/MULTIPLE N/A 08/01/2017    Dr Rosana Klein Remus Bethea yr recall    [place] EGD TRANSORAL BIOPSY SINGLE/MULTIPLE N/A 07/31/2017    Dr ROSALES Monsivais-Reactive gastropathy, reflux esophagitis, gastritis    TONSILLECTOMY      UPPER GASTROINTESTINAL ENDOSCOPY  03/01/2012    Dr. Homer Prader: GERD and gastritis, biopsy neg for intestinal metaplasia, dilation of peptic stricture     UPPER GASTROINTESTINAL ENDOSCOPY  04/02/2018    Dr ROSALES Monsivais-w/dilation over wire-54 [place]-gastritis/gastropathy    UPPER GASTROINTESTINAL ENDOSCOPY  04/02/2018    Dr ROSALES Monsivais-w/dilation over wire-54 [place]-gastritis/gastropathy    UPPER GASTROINTESTINAL ENDOSCOPY N/A 07/26/2019    Dr ROSALES Monsivais-w/dilation over wire-54 F- benign appearing stricture in the distal esophagus, gastritis-Gastropathy    UPPER GASTROINTESTINAL ENDOSCOPY N/A 07/26/2019    Dr ROSALES Monsivais-w/dilation over wire-54 F- benign appearing stricture in the distal esophagus, gastritis-Gastropathy       Family History   Problem Relation Age of Onset    No Known Problems Mother     Heart Disease Father     Colon Polyps Sister clotrimazole-betamethasone (LOTRISONE) 1-0.05 % cream Apply topically 2 times daily. 45 g 1    oxybutynin (DITROPAN) 5 MG tablet Take 5 mg by mouth 2 times daily       fluticasone-vilanterol (BREO ELLIPTA) 100-25 MCG/INH AEPB inhaler Inhale 1 puff into the lungs Daily      montelukast (SINGULAIR) 10 MG tablet Take 10 mg by mouth      escitalopram (LEXAPRO) 10 MG tablet Take 1 tablet by mouth daily 30 tablet 3    donepezil (ARICEPT) 5 MG tablet Take 1 tablet by mouth nightly 30 tablet 3    ARIPiprazole (ABILIFY) 20 MG tablet Take one tablet daily 30 tablet 3    trihexyphenidyl (ARTANE) 5 MG tablet TAKE 1 TABLET BY MOUTH THREE TIMES DAILY 90 tablet 3    hydrOXYzine (ATARAX) 25 MG tablet Take 1 tablet by mouth two time daily as needed for anxiety 60 tablet 3    fluticasone (FLOVENT HFA) 110 MCG/ACT inhaler Inhale 1 puff into the lungs 2 times daily      Fluticasone Propionate (FLONASE NA) by Nasal route daily as needed       SUMAtriptan (IMITREX) 100 MG tablet Take 100 mg by mouth once as needed for Migraine (MAY REPEAT IN 2 HRS.)      levothyroxine (SYNTHROID) 75 MCG tablet Take 75 mcg by mouth      metFORMIN (GLUCOPHAGE) 500 MG tablet Take 500 mg by mouth 2 times daily (with meals)      VENTOLIN  (90 Base) MCG/ACT inhaler        No current facility-administered medications for this visit. Allergies   Allergen Reactions    Codeine Itching       Review of Systems   Constitutional: Negative for activity change, appetite change, fatigue, fever and unexpected weight change. HENT: Positive for trouble swallowing. Respiratory: Negative for cough, choking and shortness of breath. Cardiovascular: Negative for chest pain. Gastrointestinal: Negative for abdominal distention, abdominal pain, anal bleeding, blood in stool, constipation, diarrhea, nausea, rectal pain and vomiting. Reflux     Musculoskeletal: Positive for back pain. Allergic/Immunologic: Negative for food allergies. All other systems reviewed and are negative. Objective:     /80   Pulse 64   Ht 5' 3\" (1.6 m)   Wt 175 lb (79.4 kg)   LMP  (LMP Unknown)   SpO2 99%   BMI 31.00 kg/m²     Physical Exam  Vitals signs reviewed. Constitutional:       General: She is not in acute distress. Appearance: She is well-developed. HENT:      Head: Normocephalic and atraumatic. Right Ear: External ear normal.      Left Ear: External ear normal.      Nose: Nose normal.      Comments: Mask on     Mouth/Throat:      Comments: Mask on  Eyes:      General: No scleral icterus. Right eye: No discharge. Left eye: No discharge. Conjunctiva/sclera: Conjunctivae normal.      Pupils: Pupils are equal, round, and reactive to light. Neck:      Musculoskeletal: Normal range of motion and neck supple. Cardiovascular:      Rate and Rhythm: Normal rate and regular rhythm. Heart sounds: Normal heart sounds. No murmur. No friction rub. No gallop. Pulmonary:      Effort: Pulmonary effort is normal. No respiratory distress. Breath sounds: Normal breath sounds. No wheezing or rales. Abdominal:      General: Bowel sounds are normal. There is no distension. Palpations: Abdomen is soft. There is no mass. Tenderness: There is no abdominal tenderness. There is no guarding or rebound. Musculoskeletal: Normal range of motion. Skin:     General: Skin is warm and dry. Coloration: Skin is not pale. Findings: No rash. Nails: There is no clubbing. Neurological:      Mental Status: She is alert and oriented to person, place, and time. Gait: Gait normal.   Psychiatric:         Behavior: Behavior normal.         Thought Content:  Thought content normal.

## 2021-04-19 ENCOUNTER — OFFICE VISIT (OUTPATIENT)
Age: 66
End: 2021-04-19

## 2021-04-19 VITALS — OXYGEN SATURATION: 97 % | HEART RATE: 98 BPM

## 2021-04-19 DIAGNOSIS — Z11.59 SCREENING FOR VIRAL DISEASE: Primary | ICD-10-CM

## 2021-04-19 LAB — SARS-COV-2, PCR: NORMAL

## 2021-04-19 PROCEDURE — 99999 PR OFFICE/OUTPT VISIT,PROCEDURE ONLY: CPT | Performed by: NURSE PRACTITIONER

## 2021-04-20 ENCOUNTER — OFFICE VISIT (OUTPATIENT)
Dept: OBGYN CLINIC | Age: 66
End: 2021-04-20
Payer: MEDICARE

## 2021-04-20 VITALS
BODY MASS INDEX: 30.82 KG/M2 | SYSTOLIC BLOOD PRESSURE: 172 MMHG | WEIGHT: 174 LBS | DIASTOLIC BLOOD PRESSURE: 80 MMHG | HEART RATE: 96 BPM

## 2021-04-20 DIAGNOSIS — N90.7 SEBACEOUS CYST OF LABIA: Primary | ICD-10-CM

## 2021-04-20 PROCEDURE — G8417 CALC BMI ABV UP PARAM F/U: HCPCS | Performed by: ADVANCED PRACTICE MIDWIFE

## 2021-04-20 PROCEDURE — G8427 DOCREV CUR MEDS BY ELIG CLIN: HCPCS | Performed by: ADVANCED PRACTICE MIDWIFE

## 2021-04-20 PROCEDURE — 1036F TOBACCO NON-USER: CPT | Performed by: ADVANCED PRACTICE MIDWIFE

## 2021-04-20 PROCEDURE — 1090F PRES/ABSN URINE INCON ASSESS: CPT | Performed by: ADVANCED PRACTICE MIDWIFE

## 2021-04-20 PROCEDURE — 3017F COLORECTAL CA SCREEN DOC REV: CPT | Performed by: ADVANCED PRACTICE MIDWIFE

## 2021-04-20 PROCEDURE — G8400 PT W/DXA NO RESULTS DOC: HCPCS | Performed by: ADVANCED PRACTICE MIDWIFE

## 2021-04-20 PROCEDURE — 4040F PNEUMOC VAC/ADMIN/RCVD: CPT | Performed by: ADVANCED PRACTICE MIDWIFE

## 2021-04-20 PROCEDURE — 99213 OFFICE O/P EST LOW 20 MIN: CPT | Performed by: ADVANCED PRACTICE MIDWIFE

## 2021-04-20 PROCEDURE — 1123F ACP DISCUSS/DSCN MKR DOCD: CPT | Performed by: ADVANCED PRACTICE MIDWIFE

## 2021-04-20 RX ORDER — NYSTATIN 100000 [USP'U]/G
POWDER TOPICAL
Qty: 60 G | Refills: 2 | Status: SHIPPED | OUTPATIENT
Start: 2021-04-20

## 2021-04-20 RX ORDER — SULFAMETHOXAZOLE AND TRIMETHOPRIM 800; 160 MG/1; MG/1
1 TABLET ORAL 2 TIMES DAILY
Qty: 14 TABLET | Refills: 0 | Status: SHIPPED | OUTPATIENT
Start: 2021-04-20 | End: 2021-04-27

## 2021-04-20 RX ORDER — CLOTRIMAZOLE AND BETAMETHASONE DIPROPIONATE 10; .64 MG/G; MG/G
CREAM TOPICAL
Qty: 45 G | Refills: 1 | Status: SHIPPED | OUTPATIENT
Start: 2021-04-20

## 2021-04-20 RX ORDER — FLUCONAZOLE 150 MG/1
150 TABLET ORAL
Qty: 2 TABLET | Refills: 0 | Status: SHIPPED | OUTPATIENT
Start: 2021-04-20 | End: 2021-04-26

## 2021-04-20 ASSESSMENT — ENCOUNTER SYMPTOMS
EYES NEGATIVE: 1
GASTROINTESTINAL NEGATIVE: 1
RESPIRATORY NEGATIVE: 1
ALLERGIC/IMMUNOLOGIC NEGATIVE: 1

## 2021-04-20 NOTE — PROGRESS NOTES
Gastroenterology [facility]  CNM Office Note    Ro Colindres is a 77 y.o. female who presents today for her medical conditions/ complaints as noted below. Chief Complaint   Patient presents with    Cyst     painful external cyst          EL Ware presents for evaluation of recurring labial cysts. She has noted 4 new cysts. She c/o pain and mild itching. She states the last medications she used really helped. Patient Active Problem List   Diagnosis    NSAID long-term use    History of esophageal stricture    Hypothyroidism    Bipolar affective disorder, currently active ([address]    Chronic back pain    Depression    Asthma    Hyperkalemia    Anemia of unknown etiology    Lactic acid blood increased    Urinary tract infection without hematuria    Metabolic encephalopathy    Sinus bradycardia    Essential hypertension    Rigor    Lithium toxicity    Acute kidney injury ([address]    Bradycardia    Encephalopathy, toxic    Delirium due to medical condition with behavioral disturbance    Bipolar affective disorder, mixed, severe, with psychotic behavior ([address]    Bipolar I disorder ([address]    High risk medication use    Chronic GERD    Esophageal dysphagia    Bipolar I disorder, most recent episode mixed, severe without psychotic features ([address]    S/P dilatation of esophageal stricture    Gastropathy    Esophageal stricture       No LMP recorded (lmp unknown). Patient has had a hysterectomy.   [id number]    Past Medical History:   Diagnosis Date    Asthma     Bipolar disorder ([address]     Chronic back pain     Depression     Diabetes mellitus ([address]     GERD (gastroesophageal reflux disease)     Headache(784.0)     History of adenomatous polyp of colon     Hypothyroidism      Past Surgical History:   Procedure Laterality Date    ANKLE SURGERY Right     APPENDECTOMY      BACK SURGERY  01/27/2021    x3    CHOLECYSTECTOMY      COLONOSCOPY  12/04/2006        COLONOSCOPY 04/10/2012    Dr. Hoda Garcia: tubular adenoma (5 yr)    ENDOSCOPY, COLON, DIAGNOSTIC      about 2 yrs ago   [address]    total; due to endometreosis    [place] COLONOSCOPY FLX DX W/COLLJ SPEC WHEN PFRMD N/A 07/31/2017    Dr Niurka Monsivais-aborted distal to splenic flexure due to poor prep    [place] COLONOSCOPY W/BIOPSY SINGLE/MULTIPLE N/A 08/01/2017    Dr Elma Man yr recall    [place] EGD TRANSORAL BIOPSY SINGLE/MULTIPLE N/A 07/31/2017    Dr ROSALES Monsivais-Reactive gastropathy, reflux esophagitis, gastritis    TONSILLECTOMY      UPPER GASTROINTESTINAL ENDOSCOPY  03/01/2012    Dr. Hoda Garcia: GERD and gastritis, biopsy neg for intestinal metaplasia, dilation of peptic stricture     UPPER GASTROINTESTINAL ENDOSCOPY  04/02/2018    Dr ROSALES Monsivais-w/dilation over wire-54 [place]-gastritis/gastropathy    UPPER GASTROINTESTINAL ENDOSCOPY  04/02/2018    Dr ROSALES Monsivais-w/dilation over wire-54 [place]-gastritis/gastropathy    UPPER GASTROINTESTINAL ENDOSCOPY N/A 07/26/2019    Dr ROSALES Monsivais-w/dilation over wire-54 F- benign appearing stricture in the distal esophagus, gastritis-Gastropathy    UPPER GASTROINTESTINAL ENDOSCOPY N/A 07/26/2019    Dr ROSALES Monsivais-w/dilation over wire-54 F- benign appearing stricture in the distal esophagus, gastritis-Gastropathy     Family History   Problem Relation Age of Onset    No Known Problems Mother     Heart Disease Father     Colon Polyps Sister     Colon Cancer Neg Hx     Liver Cancer Neg Hx     Liver Disease Neg Hx     Esophageal Cancer Neg Hx     Rectal Cancer Neg Hx     Stomach Cancer Neg Hx      Social History     Tobacco Use    Smoking status: Never Smoker    Smokeless tobacco: Never Used   Substance Use Topics    Alcohol use: No       Current Outpatient Medications   Medication Sig Dispense Refill    clotrimazole-betamethasone (LOTRISONE) 1-0.05 % cream Apply topically 2 times daily. 45 g 1    nystatin (MYCOSTATIN) 542531 UNIT/GM powder Apply topically 4 times daily.  60 g 2    fluconazole (DIFLUCAN) 150 MG tablet Take 1 tablet by mouth every 72 hours for 6 days 2 tablet 0    sulfamethoxazole-trimethoprim (BACTRIM DS;SEPTRA DS) 800-160 MG per tablet Take 1 tablet by mouth 2 times daily for 7 days 14 tablet 0    b complex vitamins capsule Take 1 capsule by mouth daily      omeprazole (PRILOSEC) 40 MG delayed release capsule TAKE 1 CAPSULE BY MOUTH DAILY IN THE MORNING (BEFORE BREAKFAST) 30 capsule 5    gabapentin (NEURONTIN) 800 MG tablet Take 800 mg by mouth 3 times daily.  OXcarbazepine (TRILEPTAL) 300 MG tablet       mirtazapine (REMERON) 15 MG tablet       oxybutynin (DITROPAN) 5 MG tablet Take 5 mg by mouth 2 times daily       fluticasone-vilanterol (BREO ELLIPTA) 100-25 MCG/INH AEPB inhaler Inhale 1 puff into the lungs Daily      VENTOLIN  (90 Base) MCG/ACT inhaler       montelukast (SINGULAIR) 10 MG tablet Take 10 mg by mouth      escitalopram (LEXAPRO) 10 MG tablet Take 1 tablet by mouth daily 30 tablet 3    donepezil (ARICEPT) 5 MG tablet Take 1 tablet by mouth nightly 30 tablet 3    ARIPiprazole (ABILIFY) 20 MG tablet Take one tablet daily 30 tablet 3    trihexyphenidyl (ARTANE) 5 MG tablet TAKE 1 TABLET BY MOUTH THREE TIMES DAILY 90 tablet 3    hydrOXYzine (ATARAX) 25 MG tablet Take 1 tablet by mouth two time daily as needed for anxiety 60 tablet 3    fluticasone (FLOVENT HFA) 110 MCG/ACT inhaler Inhale 1 puff into the lungs 2 times daily      Fluticasone Propionate (FLONASE NA) by Nasal route daily as needed       SUMAtriptan (IMITREX) 100 MG tablet Take 100 mg by mouth once as needed for Migraine (MAY REPEAT IN 2 HRS.)      levothyroxine (SYNTHROID) 75 MCG tablet Take 75 mcg by mouth      metFORMIN (GLUCOPHAGE) 500 MG tablet Take 500 mg by mouth 2 times daily (with meals)       No current facility-administered medications for this visit.       Allergies   Allergen Reactions    Codeine Itching     Vitals:    04/20/21 1457   BP: (!) 172/80   Pulse: 96     Body mass index is 30.82 kg/m². Review of Systems   Constitutional: Negative. HENT: Negative. Eyes: Negative. Respiratory: Negative. Cardiovascular: Negative. Gastrointestinal: Negative. Endocrine: Negative. Genitourinary:        Labial cyst   Musculoskeletal: Negative. Skin: Negative. Allergic/Immunologic: Negative. Neurological: Negative. Hematological: Negative. Psychiatric/Behavioral: Negative. Physical Exam  Constitutional:       Appearance: She is well-developed. She is not diaphoretic. HENT:      Head: Normocephalic and atraumatic. Nose: Nose normal.   Eyes:      Conjunctiva/sclera: Conjunctivae normal.      Pupils: Pupils are equal, round, and reactive to light. Neck:      Musculoskeletal: Normal range of motion and neck supple. Thyroid: No thyromegaly. Trachea: No tracheal deviation. Pulmonary:      Effort: Pulmonary effort is normal. No respiratory distress. Genitourinary:         Comments: Exudate expressed from #4 cyst - white, thick  Musculoskeletal: Normal range of motion. Comments: Normal ROM for upper and lower extremities. Gait steady. Skin:     General: Skin is warm and dry. Findings: No lesion or rash. Neurological:      Mental Status: She is alert and oriented to person, place, and time. Psychiatric:         Speech: Speech normal.         Behavior: Behavior normal.          Diagnosis Orders   1. Sebaceous cyst of labia         MEDICATIONS:  Orders Placed This Encounter   Medications    clotrimazole-betamethasone (LOTRISONE) 1-0.05 % cream     Sig: Apply topically 2 times daily. Dispense:  45 g     Refill:  1    nystatin (MYCOSTATIN) 383439 UNIT/GM powder     Sig: Apply topically 4 times daily.      Dispense:  60 g     Refill:  2    fluconazole (DIFLUCAN) 150 MG tablet     Sig: Take 1 tablet by mouth every 72 hours for 6 days     Dispense:  2 tablet     Refill:  0    sulfamethoxazole-trimethoprim (BACTRIM

## 2021-04-21 ENCOUNTER — ANESTHESIA EVENT (OUTPATIENT)
Dept: OPERATING ROOM | Age: 66
End: 2021-04-21

## 2021-04-22 ENCOUNTER — HOSPITAL ENCOUNTER (OUTPATIENT)
Age: 66
Setting detail: OUTPATIENT SURGERY
Discharge: HOME | End: 2021-04-22
Attending: INTERNAL MEDICINE | Admitting: INTERNAL MEDICINE
Payer: MEDICARE

## 2021-04-22 ENCOUNTER — ANESTHESIA (OUTPATIENT)
Dept: OPERATING ROOM | Age: 66
End: 2021-04-22

## 2021-04-22 ENCOUNTER — APPOINTMENT (OUTPATIENT)
Dept: OPERATING ROOM | Age: 66
End: 2021-04-22

## 2021-04-22 VITALS
HEART RATE: 71 BPM | HEIGHT: 63 IN | WEIGHT: 172 LBS | DIASTOLIC BLOOD PRESSURE: 70 MMHG | OXYGEN SATURATION: 98 % | BODY MASS INDEX: 30.48 KG/M2 | SYSTOLIC BLOOD PRESSURE: 128 MMHG | RESPIRATION RATE: 18 BRPM

## 2021-04-22 VITALS — OXYGEN SATURATION: 90 % | SYSTOLIC BLOOD PRESSURE: 131 MMHG | DIASTOLIC BLOOD PRESSURE: 74 MMHG

## 2021-04-22 PROCEDURE — 43248 EGD GUIDE WIRE INSERTION: CPT

## 2021-04-22 PROCEDURE — 43239 EGD BIOPSY SINGLE/MULTIPLE: CPT

## 2021-04-22 PROCEDURE — 43248 EGD GUIDE WIRE INSERTION: CPT | Performed by: INTERNAL MEDICINE

## 2021-04-22 RX ORDER — SODIUM CHLORIDE 9 MG/ML
INJECTION, SOLUTION INTRAVENOUS CONTINUOUS
Status: DISCONTINUED | OUTPATIENT
Start: 2021-04-22 | End: 2021-04-22 | Stop reason: HOSPADM

## 2021-04-22 RX ORDER — PROPOFOL 10 MG/ML
INJECTION, EMULSION INTRAVENOUS PRN
Status: DISCONTINUED | OUTPATIENT
Start: 2021-04-22 | End: 2021-04-22 | Stop reason: SDUPTHER

## 2021-04-22 RX ORDER — SODIUM CHLORIDE 9 MG/ML
INJECTION, SOLUTION INTRAVENOUS CONTINUOUS PRN
Status: DISCONTINUED | OUTPATIENT
Start: 2021-04-22 | End: 2021-04-22 | Stop reason: SDUPTHER

## 2021-04-22 RX ORDER — LIDOCAINE HYDROCHLORIDE 10 MG/ML
INJECTION, SOLUTION INFILTRATION; PERINEURAL PRN
Status: DISCONTINUED | OUTPATIENT
Start: 2021-04-22 | End: 2021-04-22 | Stop reason: SDUPTHER

## 2021-04-22 RX ADMIN — SODIUM CHLORIDE: 9 INJECTION, SOLUTION INTRAVENOUS at 13:42

## 2021-04-22 RX ADMIN — LIDOCAINE HYDROCHLORIDE 40 MG: 10 INJECTION, SOLUTION INFILTRATION; PERINEURAL at 13:47

## 2021-04-22 RX ADMIN — PROPOFOL 100 MG: 10 INJECTION, EMULSION INTRAVENOUS at 13:47

## 2021-04-22 NOTE — OP NOTE
Endoscopic Procedure Note    Patient: Teodoro Velazco : 1955  [facility] Rec#: [id number] Acc#: [id number]     Primary Care Provider Liliana Yung DO  Referring Provider: BETY Ag    Endoscopist: Franck Tom MD    Date of Procedure:  2021    Procedure:   1. EGD   2. EGD with wire-guided dilation- 47 F    Indications:   1. Dysphagia   2. Reflux     Anesthesia:  Sedation was administered by anesthesia who monitored the patient during the procedure. Estimated Blood Loss: minimal    Procedure:   After reviewing the patient's chart and obtaining informed consent, the patient was placed in the left lateral decubitus position. A forward-viewing Olympus endoscope was lubricated and inserted through the mouth into the oropharynx. Under direct visualization, the upper esophagus was intubated. The scope was advanced to the level of the third portion of duodenum. Scope was slowly withdrawn with careful inspection of the mucosal surfaces. The scope was retroflexed for inspection of the gastric fundus and incisura. Findings and maneuvers are listed in impression below. The patient tolerated the procedure well. The scope was removed. There were no immediate complications. Findings:   Esophagus: Abnormal: there was a questionable, benign-appearing stricture in the distal esophagus, dilated due to symptoms. A guidewire was placed through the endoscope and the endoscope was removed. A 54 French savory dilator was advanced down the length of the esophagus using a fixed-point wire technique. The dilator and guidewire were removed. The patient tolerated the procedure well. There is no hiatal hernia present. Stomach: Abnormal: Mild mucosal changes suggestive of gastritis noted       Duodenum: Normal      IMPRESSION:  1. Gastritis  2. S/p wire-guided dilation- 47 F     RECOMMENDATIONS:    1. Continue PPI   2.   Repeat dilation as needed    The results were discussed with the patient and family. A copy of the images obtained were given to the patient. Ryan Rich am scribing for and in the presence of Dr. Cynthia Arguelles MD.  Electronically signed by Aba Arriaga RN on 4/22/2021 at 1:05 PM    I personally performed the services described in this documentation as scribed by Phil Mack, and it appears accurate and complete.      Roberto Carlos Pleitez MD  4/22/2021

## 2021-04-22 NOTE — PROGRESS NOTES
DR. Gaffney Latoya IN TO SEE PT/FAMILY TO DISCUSS RESULTS OF PROCEDURE. PT AND FAMILY VERBALIZED UNDERSTANDING OF DISCHARGE INSTRUCTIONS.

## 2021-04-22 NOTE — H&P
metFORMIN (GLUCOPHAGE) 500 MG tablet Take 500 mg by mouth 2 times daily (with meals)   Yes Historical Provider, MD       Past Medical History:  Past Medical History:   Diagnosis Date    Asthma     [facility] ([facility]     Chronic back pain     Depression     Diabetes mellitus ([facility]     GERD (gastroesophageal reflux disease)     Headache(784.0)     History of adenomatous polyp of colon     Hypothyroidism        Past Surgical History:  Past Surgical History:   Procedure Laterality Date    ANKLE SURGERY Right     APPENDECTOMY      BACK SURGERY  01/27/2021    x3    CHOLECYSTECTOMY      COLONOSCOPY  12/04/2006        COLONOSCOPY  04/10/2012    Dr. Tasha King: tubular adenoma (5 yr)    ENDOSCOPY, COLON, DIAGNOSTIC      about 2 yrs ago   [address]    total; due to endometreosis    [place] COLONOSCOPY FLX DX W/COLLJ SPEC WHEN PFRMD N/A 07/31/2017    Dr Aj Monsivais-aborted distal to splenic flexure due to poor prep    [place] COLONOSCOPY W/BIOPSY SINGLE/MULTIPLE N/A 08/01/2017    Dr Aj Zapata Mavis Marciano yr recall    [place] EGD TRANSORAL BIOPSY SINGLE/MULTIPLE N/A 07/31/2017    Dr ROSALES Monsivais-Reactive gastropathy, reflux esophagitis, gastritis    TONSILLECTOMY      UPPER GASTROINTESTINAL ENDOSCOPY  03/01/2012    Dr. Tasha King: GERD and gastritis, biopsy neg for intestinal metaplasia, dilation of peptic stricture     UPPER GASTROINTESTINAL ENDOSCOPY  04/02/2018    Dr ROSALES Monsivais-w/dilation over wire-54 Dutch-gastritis/gastropathy    UPPER GASTROINTESTINAL ENDOSCOPY  04/02/2018    Dr ROSALES Monsivais-w/dilation over wire-54 Dutch-gastritis/gastropathy    UPPER GASTROINTESTINAL ENDOSCOPY N/A 07/26/2019    Dr ROSALES Monsivais-w/dilation over wire-54 F- benign appearing stricture in the distal esophagus, gastritis-Gastropathy    UPPER GASTROINTESTINAL ENDOSCOPY N/A 07/26/2019    Dr ROSALES Monsivais-w/dilation over wire-54 F- benign appearing stricture in the distal esophagus, gastritis-Gastropathy       Social History:  Social History

## 2021-04-22 NOTE — ANESTHESIA PRE PROCEDURE
Department of Anesthesiology  Preprocedure Note       Name:  Reyes Galo   Age:  77 y.o.  :  1955                                          MRN:  [id number]         Date:  2021      Surgeon: Bill Thorne):  Kong Iyer MD    Procedure: Procedure(s):  EGD ESOPHAGOGASTRODUODENOSCOPY    Medications prior to admission:   Prior to Admission medications    Medication Sig Start Date End Date Taking? Authorizing Provider   clotrimazole-betamethasone (LOTRISONE) 1-0.05 % cream Apply topically 2 times daily. 21  Yes BETY Galicia CNM   nystatin (MYCOSTATIN) 535881 UNIT/GM powder Apply topically 4 times daily. 21  Yes BETY Galicia CNM   fluconazole (DIFLUCAN) 150 MG tablet Take 1 tablet by mouth every 72 hours for 6 days 21 Yes BETY Galicia CNM   sulfamethoxazole-trimethoprim (BACTRIM DS;SEPTRA DS) 800-160 MG per tablet Take 1 tablet by mouth 2 times daily for 7 days 21 Yes BETY Galicia CNM   b complex vitamins capsule Take 1 capsule by mouth daily   Yes Historical Provider, MD   omeprazole (PRILOSEC) 40 MG delayed release capsule TAKE 1 CAPSULE BY MOUTH DAILY IN THE MORNING (BEFORE BREAKFAST) 3/9/21  Yes BETY Farias   gabapentin (NEURONTIN) 800 MG tablet Take 800 mg by mouth 3 times daily.   20  Yes Historical Provider, MD   OXcarbazepine (TRILEPTAL) 300 MG tablet  21  Yes Historical Provider, MD   mirtazapine (REMERON) 15 MG tablet  21  Yes Historical Provider, MD   oxybutynin (DITROPAN) 5 MG tablet Take 5 mg by mouth 2 times daily  3/17/20  Yes Historical Provider, MD   fluticasone-vilanterol (BREO ELLIPTA) 100-25 MCG/INH AEPB inhaler Inhale 1 puff into the lungs Daily   Yes Historical Provider, MD   VENTOLIN  (90 Base) MCG/ACT inhaler  20  Yes Historical Provider, MD   montelukast (SINGULAIR) 10 MG tablet Take 10 mg by mouth 19  Yes Historical Provider, MD   escitalopram (LEXAPRO) 10 MG tablet Take 1 tablet by mouth daily 5/1/19  Yes Achilles Overall, APRN   donepezil (ARICEPT) 5 MG tablet Take 1 tablet by mouth nightly 3/26/19  Yes Achilles Overall, APRN   ARIPiprazole (ABILIFY) 20 MG tablet Take one tablet daily 3/26/19  Yes Achilles Overall, APRN   trihexyphenidyl (ARTANE) 5 MG tablet TAKE 1 TABLET BY MOUTH THREE TIMES DAILY 2/12/19  Yes Achilles Overall, APRN   hydrOXYzine (ATARAX) 25 MG tablet Take 1 tablet by mouth two time daily as needed for anxiety 2/12/19  Yes Achilles Overall, APRN   fluticasone (FLOVENT HFA) 110 MCG/ACT inhaler Inhale 1 puff into the lungs 2 times daily   Yes Historical Provider, MD   Fluticasone Propionate (FLONASE NA) by Nasal route daily as needed    Yes Historical Provider, MD   SUMAtriptan (IMITREX) 100 MG tablet Take 100 mg by mouth once as needed for Migraine (MAY REPEAT IN 2 HRS.)   Yes Historical Provider, MD   levothyroxine (SYNTHROID) 75 MCG tablet Take 75 mcg by mouth   Yes Historical Provider, MD   metFORMIN (GLUCOPHAGE) 500 MG tablet Take 500 mg by mouth 2 times daily (with meals)   Yes Historical Provider, MD       Current medications:    Current Facility-Administered Medications   Medication Dose Route Frequency Provider Last Rate Last Admin    0.9 % sodium chloride infusion   Intravenous Continuous Teressa Carmichael MD           Allergies:     Allergies   Allergen Reactions    Codeine Itching       Problem List:    Patient Active Problem List   Diagnosis Code    NSAID long-term use Z79.1    History of esophageal stricture Z87.19    Hypothyroidism E03.9    [facility] F31.9    Chronic back pain M54.9, G89.29    Depression F32.9    Asthma J45.909    Hyperkalemia E87.5    Anemia of unknown etiology D64.9    Lactic acid blood increased R79.89    Urinary tract infection without hematuria Z48.3    Metabolic encephalopathy G13.73    Sinus bradycardia R00.1    Essential hypertension I10    Rigor Tests: No results for input(s): POCGLU, POCNA, POCK, POCCL, POCBUN, POCHEMO, POCHCT in the last 72 hours. Coags:   Lab Results   Component Value Date    APTT 23.3 02/04/2017       HCG (If Applicable): No results found for: PREGTESTUR, PREGSERUM, HCG, HCGQUANT     ABGs:   Lab Results   Component Value Date    PHART 7.500 02/04/2017    PO2ART 249.0 02/04/2017    JPS5GTR 27.0 02/04/2017    DPJ0NLT 21.1 02/04/2017    BEART -1.3 02/04/2017    B0DCDLGN 95.7 02/04/2017        Type & Screen (If Applicable):  No results found for: LABABO, LABRH    Drug/Infectious Status (If Applicable):  No results found for: HIV, HEPCAB    COVID-19 Screening (If Applicable):   Lab Results   Component Value Date    COVID19 NEG 04/19/2021           Anesthesia Evaluation  Patient summary reviewed no history of anesthetic complications:   Airway: Mallampati: II  TM distance: >3 FB   Neck ROM: full  Mouth opening: < 3 FB Dental: normal exam         Pulmonary:normal exam    (+) asthma:                            Cardiovascular:    (+) hypertension:,          Beta Blocker:  Not on Beta Blocker         Neuro/Psych:   (+) headaches:, psychiatric history (Bipolar):             ROS comment: Back surgery 1/2021 GI/Hepatic/Renal:   (+) GERD:, bowel prep,          ROS comment: BMI 30. Endo/Other:    (+) Diabetes, hypothyroidism::., .                 Abdominal:           Vascular: negative vascular ROS. Anesthesia Plan      general and TIVA     ASA 3       Induction: intravenous. Anesthetic plan and risks discussed with patient.                       BETY Smith - CRNA   4/22/2021

## 2021-05-17 ENCOUNTER — OFFICE VISIT (OUTPATIENT)
Dept: PULMONOLOGY | Facility: CLINIC | Age: 66
End: 2021-05-17

## 2021-05-17 VITALS
BODY MASS INDEX: 30.3 KG/M2 | OXYGEN SATURATION: 98 % | HEIGHT: 63 IN | SYSTOLIC BLOOD PRESSURE: 118 MMHG | DIASTOLIC BLOOD PRESSURE: 76 MMHG | HEART RATE: 63 BPM | WEIGHT: 171 LBS

## 2021-05-17 DIAGNOSIS — E66.01 MORBIDLY OBESE (HCC): Chronic | ICD-10-CM

## 2021-05-17 DIAGNOSIS — J45.40 MODERATE PERSISTENT ASTHMA WITHOUT COMPLICATION: Primary | Chronic | ICD-10-CM

## 2021-05-17 DIAGNOSIS — J45.40 MODERATE PERSISTENT ASTHMA WITHOUT COMPLICATION: ICD-10-CM

## 2021-05-17 PROCEDURE — 99214 OFFICE O/P EST MOD 30 MIN: CPT | Performed by: NURSE PRACTITIONER

## 2021-05-17 RX ORDER — ALBUTEROL SULFATE 2.5 MG/3ML
2.5 SOLUTION RESPIRATORY (INHALATION) 4 TIMES DAILY PRN
Qty: 120 ML | Refills: 5 | Status: SHIPPED | OUTPATIENT
Start: 2021-05-17 | End: 2021-06-16

## 2021-05-17 RX ORDER — HYDROCODONE BITARTRATE AND ACETAMINOPHEN 5; 325 MG/1; MG/1
1 TABLET ORAL AS NEEDED
COMMUNITY
End: 2021-12-14

## 2021-06-03 ENCOUNTER — NURSE TRIAGE (OUTPATIENT)
Dept: CALL CENTER | Facility: HOSPITAL | Age: 66
End: 2021-06-03

## 2021-09-07 ENCOUNTER — TELEPHONE (OUTPATIENT)
Dept: OBGYN CLINIC | Age: 66
End: 2021-09-07

## 2021-09-07 NOTE — TELEPHONE ENCOUNTER
Patient is requesting a return call from the office. Patient is asking for a medication for her external vaginal cyst. Patient states she has seen provider for his issue before. Please return call. Thank you!

## 2021-09-08 NOTE — TELEPHONE ENCOUNTER
Spoke with pt, she is going to call her pharmacy and see if they have the prescription for Lotrisone that was called in April 2021 and start that. If no improvement, pt will call us back.

## 2021-11-01 PROBLEM — R09.82 POSTNASAL DRIP: Chronic | Status: ACTIVE | Noted: 2019-01-23

## 2021-11-15 ENCOUNTER — APPOINTMENT (OUTPATIENT)
Dept: LAB | Facility: HOSPITAL | Age: 66
End: 2021-11-15

## 2021-12-08 ENCOUNTER — TELEPHONE (OUTPATIENT)
Dept: PODIATRY | Facility: CLINIC | Age: 66
End: 2021-12-08

## 2021-12-14 ENCOUNTER — OFFICE VISIT (OUTPATIENT)
Dept: PODIATRY | Facility: CLINIC | Age: 66
End: 2021-12-14

## 2021-12-14 VITALS — BODY MASS INDEX: 33.13 KG/M2 | HEART RATE: 69 BPM | WEIGHT: 187 LBS | HEIGHT: 63 IN | OXYGEN SATURATION: 99 %

## 2021-12-14 DIAGNOSIS — M79.672 FOOT PAIN, LEFT: Primary | ICD-10-CM

## 2021-12-14 DIAGNOSIS — E11.40 TYPE 2 DIABETES MELLITUS WITH DIABETIC NEUROPATHY, WITHOUT LONG-TERM CURRENT USE OF INSULIN (HCC): ICD-10-CM

## 2021-12-14 DIAGNOSIS — M20.41 HAMMER TOES OF BOTH FEET: ICD-10-CM

## 2021-12-14 DIAGNOSIS — M20.42 HAMMER TOES OF BOTH FEET: ICD-10-CM

## 2021-12-14 PROCEDURE — 99203 OFFICE O/P NEW LOW 30 MIN: CPT | Performed by: PODIATRIST

## 2021-12-15 ENCOUNTER — PATIENT ROUNDING (BHMG ONLY) (OUTPATIENT)
Dept: PODIATRY | Facility: CLINIC | Age: 66
End: 2021-12-15

## 2022-01-10 ENCOUNTER — LAB (OUTPATIENT)
Dept: LAB | Facility: HOSPITAL | Age: 67
End: 2022-01-10

## 2022-01-10 ENCOUNTER — HOSPITAL ENCOUNTER (OUTPATIENT)
Dept: GENERAL RADIOLOGY | Facility: HOSPITAL | Age: 67
Discharge: HOME | End: 2022-01-10

## 2022-01-10 DIAGNOSIS — Z01.812 ENCOUNTER FOR PREOPERATIVE SCREENING LABORATORY TESTING FOR COVID-19 VIRUS: ICD-10-CM

## 2022-01-10 DIAGNOSIS — M79.672 FOOT PAIN, LEFT: ICD-10-CM

## 2022-01-10 DIAGNOSIS — Z20.822 ENCOUNTER FOR PREOPERATIVE SCREENING LABORATORY TESTING FOR COVID-19 VIRUS: ICD-10-CM

## 2022-01-10 LAB — SARS-COV-2 ORF1AB RESP QL NAA+PROBE: NOT DETECTED

## 2022-01-10 PROCEDURE — U0005 INFEC AGEN DETEC AMPLI PROBE: HCPCS

## 2022-01-10 PROCEDURE — U0004 COV-19 TEST NON-CDC HGH THRU: HCPCS

## 2022-01-10 PROCEDURE — C9803 HOPD COVID-19 SPEC COLLECT: HCPCS

## 2022-01-10 PROCEDURE — 73630 X-RAY EXAM OF FOOT: CPT

## 2022-01-12 ENCOUNTER — TELEPHONE (OUTPATIENT)
Dept: PODIATRY | Facility: CLINIC | Age: 67
End: 2022-01-12

## 2022-01-13 ENCOUNTER — PROCEDURE VISIT (OUTPATIENT)
Dept: PULMONOLOGY | Facility: CLINIC | Age: 67
End: 2022-01-13

## 2022-01-13 ENCOUNTER — OFFICE VISIT (OUTPATIENT)
Dept: PULMONOLOGY | Facility: CLINIC | Age: 67
End: 2022-01-13

## 2022-01-13 VITALS
WEIGHT: 199.6 LBS | HEART RATE: 75 BPM | DIASTOLIC BLOOD PRESSURE: 72 MMHG | BODY MASS INDEX: 36.73 KG/M2 | HEIGHT: 62 IN | SYSTOLIC BLOOD PRESSURE: 120 MMHG | OXYGEN SATURATION: 98 %

## 2022-01-13 DIAGNOSIS — Z20.822 ENCOUNTER FOR PREOPERATIVE SCREENING LABORATORY TESTING FOR COVID-19 VIRUS: Primary | ICD-10-CM

## 2022-01-13 DIAGNOSIS — J45.40 MODERATE PERSISTENT ASTHMA WITHOUT COMPLICATION: Chronic | ICD-10-CM

## 2022-01-13 DIAGNOSIS — Z01.812 ENCOUNTER FOR PREOPERATIVE SCREENING LABORATORY TESTING FOR COVID-19 VIRUS: Primary | ICD-10-CM

## 2022-01-13 DIAGNOSIS — J45.40 MODERATE PERSISTENT ASTHMA WITHOUT COMPLICATION: Primary | ICD-10-CM

## 2022-01-13 DIAGNOSIS — E66.01 MORBIDLY OBESE: Chronic | ICD-10-CM

## 2022-01-13 DIAGNOSIS — J41.0 SIMPLE CHRONIC BRONCHITIS: Chronic | ICD-10-CM

## 2022-01-13 PROCEDURE — 94729 DIFFUSING CAPACITY: CPT | Performed by: NURSE PRACTITIONER

## 2022-01-13 PROCEDURE — 94375 RESPIRATORY FLOW VOLUME LOOP: CPT | Performed by: NURSE PRACTITIONER

## 2022-01-13 PROCEDURE — 99214 OFFICE O/P EST MOD 30 MIN: CPT | Performed by: NURSE PRACTITIONER

## 2022-01-13 RX ORDER — IPRATROPIUM BROMIDE AND ALBUTEROL SULFATE 2.5; .5 MG/3ML; MG/3ML
3 SOLUTION RESPIRATORY (INHALATION) 4 TIMES DAILY PRN
Qty: 120 ML | Refills: 5 | Status: SHIPPED | OUTPATIENT
Start: 2022-01-13 | End: 2022-02-12

## 2022-01-19 ENCOUNTER — TELEPHONE (OUTPATIENT)
Dept: PODIATRY | Facility: CLINIC | Age: 67
End: 2022-01-19

## 2022-01-26 ENCOUNTER — OFFICE VISIT (OUTPATIENT)
Dept: PODIATRY | Facility: CLINIC | Age: 67
End: 2022-01-26

## 2022-01-26 VITALS
SYSTOLIC BLOOD PRESSURE: 122 MMHG | BODY MASS INDEX: 35.59 KG/M2 | OXYGEN SATURATION: 99 % | WEIGHT: 193.4 LBS | DIASTOLIC BLOOD PRESSURE: 66 MMHG | HEIGHT: 62 IN | HEART RATE: 55 BPM

## 2022-01-26 DIAGNOSIS — M20.42 HAMMER TOES OF BOTH FEET: Primary | ICD-10-CM

## 2022-01-26 DIAGNOSIS — M20.41 HAMMER TOES OF BOTH FEET: Primary | ICD-10-CM

## 2022-01-26 DIAGNOSIS — M79.672 FOOT PAIN, LEFT: ICD-10-CM

## 2022-01-26 DIAGNOSIS — E11.40 TYPE 2 DIABETES MELLITUS WITH DIABETIC NEUROPATHY, WITHOUT LONG-TERM CURRENT USE OF INSULIN: ICD-10-CM

## 2022-01-26 PROCEDURE — 99214 OFFICE O/P EST MOD 30 MIN: CPT | Performed by: PODIATRIST

## 2022-01-27 ENCOUNTER — TELEPHONE (OUTPATIENT)
Dept: PODIATRY | Facility: CLINIC | Age: 67
End: 2022-01-27

## 2022-01-27 PROBLEM — M20.42 HAMMER TOES OF BOTH FEET: Status: ACTIVE | Noted: 2022-01-27

## 2022-01-27 PROBLEM — M20.41 HAMMER TOES OF BOTH FEET: Status: ACTIVE | Noted: 2022-01-27

## 2022-01-27 PROBLEM — M79.672 FOOT PAIN, LEFT: Status: ACTIVE | Noted: 2022-01-27

## 2022-02-07 ENCOUNTER — PRE-ADMISSION TESTING (OUTPATIENT)
Dept: PREADMISSION TESTING | Facility: HOSPITAL | Age: 67
End: 2022-02-07

## 2022-02-07 ENCOUNTER — LAB (OUTPATIENT)
Dept: LAB | Facility: HOSPITAL | Age: 67
End: 2022-02-07

## 2022-02-07 ENCOUNTER — DOCUMENTATION (OUTPATIENT)
Dept: PASTORAL CARE | Facility: HOSPITAL | Age: 67
End: 2022-02-07

## 2022-02-07 ENCOUNTER — HOSPITAL ENCOUNTER (OUTPATIENT)
Dept: GENERAL RADIOLOGY | Facility: HOSPITAL | Age: 67
Discharge: HOME | End: 2022-02-07

## 2022-02-07 VITALS
BODY MASS INDEX: 36.51 KG/M2 | SYSTOLIC BLOOD PRESSURE: 154 MMHG | DIASTOLIC BLOOD PRESSURE: 88 MMHG | HEIGHT: 62 IN | WEIGHT: 198.41 LBS | RESPIRATION RATE: 16 BRPM | OXYGEN SATURATION: 100 % | HEART RATE: 64 BPM

## 2022-02-07 DIAGNOSIS — M20.41 HAMMER TOES OF BOTH FEET: ICD-10-CM

## 2022-02-07 DIAGNOSIS — M79.672 FOOT PAIN, LEFT: ICD-10-CM

## 2022-02-07 DIAGNOSIS — M20.42 HAMMER TOES OF BOTH FEET: ICD-10-CM

## 2022-02-07 LAB
ALBUMIN SERPL-MCNC: 4.2 G/DL (ref 3.5–5.2)
ALBUMIN/GLOB SERPL: 1.9 G/DL
ALP SERPL-CCNC: 159 U/L (ref 39–117)
ALT SERPL W P-5'-P-CCNC: 10 U/L (ref 1–33)
ANION GAP SERPL CALCULATED.3IONS-SCNC: 7 MMOL/L (ref 5–15)
AST SERPL-CCNC: 12 U/L (ref 1–32)
BASOPHILS # BLD AUTO: 0.01 10*3/MM3 (ref 0–0.2)
BASOPHILS NFR BLD AUTO: 0.2 % (ref 0–1.5)
BILIRUB SERPL-MCNC: 0.2 MG/DL (ref 0–1.2)
BUN SERPL-MCNC: 10 MG/DL (ref 8–23)
BUN/CREAT SERPL: 14.5 (ref 7–25)
CALCIUM SPEC-SCNC: 9 MG/DL (ref 8.6–10.5)
CHLORIDE SERPL-SCNC: 98 MMOL/L (ref 98–107)
CO2 SERPL-SCNC: 29 MMOL/L (ref 22–29)
CREAT SERPL-MCNC: 0.69 MG/DL (ref 0.57–1)
DEPRECATED RDW RBC AUTO: 44.1 FL (ref 37–54)
EOSINOPHIL # BLD AUTO: 0.18 10*3/MM3 (ref 0–0.4)
EOSINOPHIL NFR BLD AUTO: 3.1 % (ref 0.3–6.2)
ERYTHROCYTE [DISTWIDTH] IN BLOOD BY AUTOMATED COUNT: 14.4 % (ref 12.3–15.4)
GFR SERPL CREATININE-BSD FRML MDRD: 85 ML/MIN/1.73
GLOBULIN UR ELPH-MCNC: 2.2 GM/DL
GLUCOSE SERPL-MCNC: 97 MG/DL (ref 65–99)
HCT VFR BLD AUTO: 32.5 % (ref 34–46.6)
HGB BLD-MCNC: 9.4 G/DL (ref 12–15.9)
IMM GRANULOCYTES # BLD AUTO: 0.06 10*3/MM3 (ref 0–0.05)
IMM GRANULOCYTES NFR BLD AUTO: 1 % (ref 0–0.5)
LYMPHOCYTES # BLD AUTO: 1.46 10*3/MM3 (ref 0.7–3.1)
LYMPHOCYTES NFR BLD AUTO: 25 % (ref 19.6–45.3)
MCH RBC QN AUTO: 24.3 PG (ref 26.6–33)
MCHC RBC AUTO-ENTMCNC: 28.9 G/DL (ref 31.5–35.7)
MCV RBC AUTO: 84 FL (ref 79–97)
MONOCYTES # BLD AUTO: 0.32 10*3/MM3 (ref 0.1–0.9)
MONOCYTES NFR BLD AUTO: 5.5 % (ref 5–12)
NEUTROPHILS NFR BLD AUTO: 3.82 10*3/MM3 (ref 1.7–7)
NEUTROPHILS NFR BLD AUTO: 65.2 % (ref 42.7–76)
NRBC BLD AUTO-RTO: 0 /100 WBC (ref 0–0.2)
PLATELET # BLD AUTO: 314 10*3/MM3 (ref 140–450)
PMV BLD AUTO: 8.8 FL (ref 6–12)
POTASSIUM SERPL-SCNC: 4.8 MMOL/L (ref 3.5–5.2)
PROT SERPL-MCNC: 6.4 G/DL (ref 6–8.5)
RBC # BLD AUTO: 3.87 10*6/MM3 (ref 3.77–5.28)
SARS-COV-2 ORF1AB RESP QL NAA+PROBE: NOT DETECTED
SODIUM SERPL-SCNC: 134 MMOL/L (ref 136–145)
WBC NRBC COR # BLD: 5.85 10*3/MM3 (ref 3.4–10.8)

## 2022-02-07 PROCEDURE — 80053 COMPREHEN METABOLIC PANEL: CPT

## 2022-02-07 PROCEDURE — 71046 X-RAY EXAM CHEST 2 VIEWS: CPT

## 2022-02-07 PROCEDURE — C9803 HOPD COVID-19 SPEC COLLECT: HCPCS

## 2022-02-07 PROCEDURE — 93010 ELECTROCARDIOGRAM REPORT: CPT | Performed by: INTERNAL MEDICINE

## 2022-02-07 PROCEDURE — 36415 COLL VENOUS BLD VENIPUNCTURE: CPT

## 2022-02-07 PROCEDURE — 85025 COMPLETE CBC W/AUTO DIFF WBC: CPT

## 2022-02-07 PROCEDURE — U0005 INFEC AGEN DETEC AMPLI PROBE: HCPCS

## 2022-02-07 PROCEDURE — U0004 COV-19 TEST NON-CDC HGH THRU: HCPCS

## 2022-02-07 PROCEDURE — 93005 ELECTROCARDIOGRAM TRACING: CPT

## 2022-02-07 RX ORDER — MONTELUKAST SODIUM 10 MG/1
10 TABLET ORAL NIGHTLY
COMMUNITY
End: 2022-05-06

## 2022-02-08 ENCOUNTER — TELEPHONE (OUTPATIENT)
Dept: PODIATRY | Facility: CLINIC | Age: 67
End: 2022-02-08

## 2022-02-08 LAB
QT INTERVAL: 420 MS
QTC INTERVAL: 415 MS

## 2022-02-09 ENCOUNTER — ANESTHESIA EVENT (OUTPATIENT)
Dept: PERIOP | Facility: HOSPITAL | Age: 67
End: 2022-02-09

## 2022-02-09 ENCOUNTER — APPOINTMENT (OUTPATIENT)
Dept: GENERAL RADIOLOGY | Facility: HOSPITAL | Age: 67
End: 2022-02-09

## 2022-02-09 ENCOUNTER — HOSPITAL ENCOUNTER (OUTPATIENT)
Facility: HOSPITAL | Age: 67
Setting detail: HOSPITAL OUTPATIENT SURGERY
Discharge: HOME | End: 2022-02-09
Attending: PODIATRIST | Admitting: PODIATRIST

## 2022-02-09 ENCOUNTER — ANESTHESIA (OUTPATIENT)
Dept: PERIOP | Facility: HOSPITAL | Age: 67
End: 2022-02-09

## 2022-02-09 VITALS
RESPIRATION RATE: 18 BRPM | OXYGEN SATURATION: 98 % | TEMPERATURE: 98.2 F | DIASTOLIC BLOOD PRESSURE: 96 MMHG | SYSTOLIC BLOOD PRESSURE: 109 MMHG | HEART RATE: 86 BPM

## 2022-02-09 DIAGNOSIS — M20.41 HAMMER TOES OF BOTH FEET: ICD-10-CM

## 2022-02-09 DIAGNOSIS — M20.42 HAMMER TOES OF BOTH FEET: ICD-10-CM

## 2022-02-09 DIAGNOSIS — M79.672 FOOT PAIN, LEFT: ICD-10-CM

## 2022-02-09 DIAGNOSIS — G89.18 POSTOPERATIVE PAIN: Primary | ICD-10-CM

## 2022-02-09 LAB
GLUCOSE BLDC GLUCOMTR-MCNC: 103 MG/DL (ref 70–130)
GLUCOSE BLDC GLUCOMTR-MCNC: 110 MG/DL (ref 70–130)

## 2022-02-09 PROCEDURE — 25010000002 CEFAZOLIN PER 500 MG

## 2022-02-09 PROCEDURE — 25010000002 MIDAZOLAM PER 1 MG: Performed by: ANESTHESIOLOGY

## 2022-02-09 PROCEDURE — 28755 FUSION OF BIG TOE JOINT: CPT | Performed by: NURSE PRACTITIONER

## 2022-02-09 PROCEDURE — C1776 JOINT DEVICE (IMPLANTABLE): HCPCS | Performed by: PODIATRIST

## 2022-02-09 PROCEDURE — 28285 REPAIR OF HAMMERTOE: CPT | Performed by: PODIATRIST

## 2022-02-09 PROCEDURE — 73620 X-RAY EXAM OF FOOT: CPT

## 2022-02-09 PROCEDURE — 82962 GLUCOSE BLOOD TEST: CPT

## 2022-02-09 PROCEDURE — 25010000002 ONDANSETRON PER 1 MG

## 2022-02-09 PROCEDURE — C1713 ANCHOR/SCREW BN/BN,TIS/BN: HCPCS | Performed by: PODIATRIST

## 2022-02-09 PROCEDURE — 25010000002 PROPOFOL 10 MG/ML EMULSION

## 2022-02-09 PROCEDURE — 25010000002 FENTANYL CITRATE (PF) 100 MCG/2ML SOLUTION

## 2022-02-09 PROCEDURE — 28755 FUSION OF BIG TOE JOINT: CPT | Performed by: PODIATRIST

## 2022-02-09 PROCEDURE — 28285 REPAIR OF HAMMERTOE: CPT | Performed by: NURSE PRACTITIONER

## 2022-02-09 PROCEDURE — 25010000002 FENTANYL CITRATE (PF) 50 MCG/ML SOLUTION: Performed by: ANESTHESIOLOGY

## 2022-02-09 DEVICE — MINI MONSTER HEADLESS, SHORT THREAD, 3.5 X 38MM
Type: IMPLANTABLE DEVICE | Site: TOES | Status: FUNCTIONAL
Brand: MONSTER SCREW SYSTEM

## 2022-02-09 DEVICE — K-WIRE, SINGLE ENDED TROCAR TIP, SMOOTH, 1.2 X 150MM
Type: IMPLANTABLE DEVICE | Site: TOES | Status: FUNCTIONAL
Brand: MONSTER SCREW SYSTEM

## 2022-02-09 DEVICE — HAMMERTUBE™ SYSTEM IMPLANT KIT, Ø2.75 X 14MM, 0°, STERILE
Type: IMPLANTABLE DEVICE | Site: TOES | Status: FUNCTIONAL
Brand: HAMMERTUBE™ SYSTEM

## 2022-02-09 RX ORDER — SODIUM CHLORIDE 0.9 % (FLUSH) 0.9 %
10 SYRINGE (ML) INJECTION AS NEEDED
Status: DISCONTINUED | OUTPATIENT
Start: 2022-02-09 | End: 2022-02-09 | Stop reason: HOSPADM

## 2022-02-09 RX ORDER — PROPOFOL 10 MG/ML
VIAL (ML) INTRAVENOUS AS NEEDED
Status: DISCONTINUED | OUTPATIENT
Start: 2022-02-09 | End: 2022-02-09 | Stop reason: SURG

## 2022-02-09 RX ORDER — PROMETHAZINE HYDROCHLORIDE 12.5 MG/1
12.5 TABLET ORAL EVERY 8 HOURS PRN
Qty: 21 TABLET | Refills: 0 | Status: SHIPPED | OUTPATIENT
Start: 2022-02-09 | End: 2022-02-16

## 2022-02-09 RX ORDER — FENTANYL CITRATE 50 UG/ML
INJECTION, SOLUTION INTRAMUSCULAR; INTRAVENOUS AS NEEDED
Status: DISCONTINUED | OUTPATIENT
Start: 2022-02-09 | End: 2022-02-09 | Stop reason: SURG

## 2022-02-09 RX ORDER — ONDANSETRON 2 MG/ML
INJECTION INTRAMUSCULAR; INTRAVENOUS AS NEEDED
Status: DISCONTINUED | OUTPATIENT
Start: 2022-02-09 | End: 2022-02-09 | Stop reason: SURG

## 2022-02-09 RX ORDER — FLUMAZENIL 0.1 MG/ML
0.2 INJECTION INTRAVENOUS AS NEEDED
Status: DISCONTINUED | OUTPATIENT
Start: 2022-02-09 | End: 2022-02-09 | Stop reason: HOSPADM

## 2022-02-09 RX ORDER — DEXTROSE MONOHYDRATE 25 G/50ML
12.5 INJECTION, SOLUTION INTRAVENOUS AS NEEDED
Status: DISCONTINUED | OUTPATIENT
Start: 2022-02-09 | End: 2022-02-09 | Stop reason: HOSPADM

## 2022-02-09 RX ORDER — SODIUM CHLORIDE 0.9 % (FLUSH) 0.9 %
10 SYRINGE (ML) INJECTION EVERY 12 HOURS SCHEDULED
Status: DISCONTINUED | OUTPATIENT
Start: 2022-02-09 | End: 2022-02-09 | Stop reason: HOSPADM

## 2022-02-09 RX ORDER — SODIUM CHLORIDE 0.9 % (FLUSH) 0.9 %
3 SYRINGE (ML) INJECTION AS NEEDED
Status: DISCONTINUED | OUTPATIENT
Start: 2022-02-09 | End: 2022-02-09 | Stop reason: HOSPADM

## 2022-02-09 RX ORDER — FENTANYL CITRATE 50 UG/ML
25 INJECTION, SOLUTION INTRAMUSCULAR; INTRAVENOUS
Status: DISCONTINUED | OUTPATIENT
Start: 2022-02-09 | End: 2022-02-09 | Stop reason: HOSPADM

## 2022-02-09 RX ORDER — EPHEDRINE SULFATE 50 MG/ML
INJECTION, SOLUTION INTRAVENOUS AS NEEDED
Status: DISCONTINUED | OUTPATIENT
Start: 2022-02-09 | End: 2022-02-09 | Stop reason: SURG

## 2022-02-09 RX ORDER — OXYCODONE AND ACETAMINOPHEN 7.5; 325 MG/1; MG/1
1 TABLET ORAL EVERY 6 HOURS PRN
Qty: 28 TABLET | Refills: 0 | Status: SHIPPED | OUTPATIENT
Start: 2022-02-09 | End: 2022-03-22

## 2022-02-09 RX ORDER — SODIUM CHLORIDE, SODIUM LACTATE, POTASSIUM CHLORIDE, CALCIUM CHLORIDE 600; 310; 30; 20 MG/100ML; MG/100ML; MG/100ML; MG/100ML
1000 INJECTION, SOLUTION INTRAVENOUS CONTINUOUS
Status: DISCONTINUED | OUTPATIENT
Start: 2022-02-09 | End: 2022-02-09 | Stop reason: HOSPADM

## 2022-02-09 RX ORDER — NALOXONE HCL 0.4 MG/ML
0.04 VIAL (ML) INJECTION AS NEEDED
Status: DISCONTINUED | OUTPATIENT
Start: 2022-02-09 | End: 2022-02-09 | Stop reason: HOSPADM

## 2022-02-09 RX ORDER — ALBUTEROL SULFATE 90 UG/1
AEROSOL, METERED RESPIRATORY (INHALATION) AS NEEDED
Status: DISCONTINUED | OUTPATIENT
Start: 2022-02-09 | End: 2022-02-09 | Stop reason: SURG

## 2022-02-09 RX ORDER — BUPIVACAINE HCL/0.9 % NACL/PF 0.1 %
2 PLASTIC BAG, INJECTION (ML) EPIDURAL ONCE
Status: COMPLETED | OUTPATIENT
Start: 2022-02-09 | End: 2022-02-09

## 2022-02-09 RX ORDER — SODIUM CHLORIDE, SODIUM LACTATE, POTASSIUM CHLORIDE, CALCIUM CHLORIDE 600; 310; 30; 20 MG/100ML; MG/100ML; MG/100ML; MG/100ML
9 INJECTION, SOLUTION INTRAVENOUS CONTINUOUS
Status: DISCONTINUED | OUTPATIENT
Start: 2022-02-09 | End: 2022-02-09 | Stop reason: HOSPADM

## 2022-02-09 RX ORDER — LIDOCAINE HYDROCHLORIDE 10 MG/ML
0.5 INJECTION, SOLUTION EPIDURAL; INFILTRATION; INTRACAUDAL; PERINEURAL ONCE AS NEEDED
Status: DISCONTINUED | OUTPATIENT
Start: 2022-02-09 | End: 2022-02-09 | Stop reason: HOSPADM

## 2022-02-09 RX ORDER — MIDAZOLAM HYDROCHLORIDE 1 MG/ML
0.5 INJECTION INTRAMUSCULAR; INTRAVENOUS
Status: DISCONTINUED | OUTPATIENT
Start: 2022-02-09 | End: 2022-02-09 | Stop reason: HOSPADM

## 2022-02-09 RX ORDER — BUPIVACAINE HYDROCHLORIDE 5 MG/ML
INJECTION, SOLUTION PERINEURAL AS NEEDED
Status: DISCONTINUED | OUTPATIENT
Start: 2022-02-09 | End: 2022-02-09 | Stop reason: HOSPADM

## 2022-02-09 RX ORDER — HYDROMORPHONE HYDROCHLORIDE 1 MG/ML
0.5 INJECTION, SOLUTION INTRAMUSCULAR; INTRAVENOUS; SUBCUTANEOUS
Status: DISCONTINUED | OUTPATIENT
Start: 2022-02-09 | End: 2022-02-09 | Stop reason: HOSPADM

## 2022-02-09 RX ORDER — MAGNESIUM HYDROXIDE 1200 MG/15ML
LIQUID ORAL AS NEEDED
Status: DISCONTINUED | OUTPATIENT
Start: 2022-02-09 | End: 2022-02-09 | Stop reason: HOSPADM

## 2022-02-09 RX ORDER — DOCUSATE SODIUM 100 MG/1
100 CAPSULE, LIQUID FILLED ORAL DAILY
Qty: 7 CAPSULE | Refills: 0 | Status: SHIPPED | OUTPATIENT
Start: 2022-02-09 | End: 2022-02-16

## 2022-02-09 RX ORDER — IBUPROFEN 600 MG/1
600 TABLET ORAL ONCE AS NEEDED
Status: DISCONTINUED | OUTPATIENT
Start: 2022-02-09 | End: 2022-02-09 | Stop reason: HOSPADM

## 2022-02-09 RX ORDER — ONDANSETRON 2 MG/ML
4 INJECTION INTRAMUSCULAR; INTRAVENOUS AS NEEDED
Status: DISCONTINUED | OUTPATIENT
Start: 2022-02-09 | End: 2022-02-09 | Stop reason: HOSPADM

## 2022-02-09 RX ORDER — OXYCODONE AND ACETAMINOPHEN 10; 325 MG/1; MG/1
1 TABLET ORAL ONCE AS NEEDED
Status: COMPLETED | OUTPATIENT
Start: 2022-02-09 | End: 2022-02-09

## 2022-02-09 RX ORDER — LABETALOL HYDROCHLORIDE 5 MG/ML
5 INJECTION, SOLUTION INTRAVENOUS
Status: DISCONTINUED | OUTPATIENT
Start: 2022-02-09 | End: 2022-02-09 | Stop reason: HOSPADM

## 2022-02-09 RX ORDER — LIDOCAINE HYDROCHLORIDE 20 MG/ML
INJECTION, SOLUTION EPIDURAL; INFILTRATION; INTRACAUDAL; PERINEURAL AS NEEDED
Status: DISCONTINUED | OUTPATIENT
Start: 2022-02-09 | End: 2022-02-09 | Stop reason: SURG

## 2022-02-09 RX ADMIN — Medication 2 G: at 12:20

## 2022-02-09 RX ADMIN — VASOPRESSIN 0.25 ML: 20 INJECTION INTRAVENOUS at 13:16

## 2022-02-09 RX ADMIN — EPHEDRINE SULFATE 25 MG: 50 INJECTION INTRAVENOUS at 12:25

## 2022-02-09 RX ADMIN — SODIUM CHLORIDE, POTASSIUM CHLORIDE, SODIUM LACTATE AND CALCIUM CHLORIDE 1000 ML: 600; 310; 30; 20 INJECTION, SOLUTION INTRAVENOUS at 10:21

## 2022-02-09 RX ADMIN — LIDOCAINE HYDROCHLORIDE 80 MG: 20 INJECTION, SOLUTION EPIDURAL; INFILTRATION; INTRACAUDAL; PERINEURAL at 12:14

## 2022-02-09 RX ADMIN — EPHEDRINE SULFATE 25 MG: 50 INJECTION INTRAVENOUS at 12:56

## 2022-02-09 RX ADMIN — FENTANYL CITRATE 100 MCG: 50 INJECTION, SOLUTION INTRAMUSCULAR; INTRAVENOUS at 12:14

## 2022-02-09 RX ADMIN — FENTANYL CITRATE 25 MCG: 50 INJECTION, SOLUTION INTRAMUSCULAR; INTRAVENOUS at 14:45

## 2022-02-09 RX ADMIN — SODIUM CHLORIDE, POTASSIUM CHLORIDE, SODIUM LACTATE AND CALCIUM CHLORIDE: 600; 310; 30; 20 INJECTION, SOLUTION INTRAVENOUS at 13:51

## 2022-02-09 RX ADMIN — ALBUTEROL SULFATE 2 PUFF: 108 INHALANT RESPIRATORY (INHALATION) at 13:43

## 2022-02-09 RX ADMIN — OXYCODONE AND ACETAMINOPHEN 1 TABLET: 325; 10 TABLET ORAL at 14:38

## 2022-02-09 RX ADMIN — MIDAZOLAM 0.5 MG: 1 INJECTION INTRAMUSCULAR; INTRAVENOUS at 12:05

## 2022-02-09 RX ADMIN — ONDANSETRON 4 MG: 2 INJECTION INTRAMUSCULAR; INTRAVENOUS at 13:42

## 2022-02-09 RX ADMIN — FENTANYL CITRATE 25 MCG: 50 INJECTION, SOLUTION INTRAMUSCULAR; INTRAVENOUS at 14:40

## 2022-02-09 RX ADMIN — ALBUTEROL SULFATE 4 PUFF: 108 INHALANT RESPIRATORY (INHALATION) at 12:12

## 2022-02-09 RX ADMIN — PROPOFOL 150 MG: 10 INJECTION, EMULSION INTRAVENOUS at 12:14

## 2022-02-14 ENCOUNTER — TELEPHONE (OUTPATIENT)
Dept: PODIATRY | Facility: CLINIC | Age: 67
End: 2022-02-14

## 2022-02-17 ENCOUNTER — TELEPHONE (OUTPATIENT)
Dept: VASCULAR SURGERY | Facility: CLINIC | Age: 67
End: 2022-02-17

## 2022-02-17 ENCOUNTER — OFFICE VISIT (OUTPATIENT)
Dept: PODIATRY | Facility: CLINIC | Age: 67
End: 2022-02-17

## 2022-02-17 VITALS
DIASTOLIC BLOOD PRESSURE: 78 MMHG | HEIGHT: 62 IN | WEIGHT: 198 LBS | SYSTOLIC BLOOD PRESSURE: 130 MMHG | HEART RATE: 58 BPM | OXYGEN SATURATION: 99 % | BODY MASS INDEX: 36.44 KG/M2

## 2022-02-17 DIAGNOSIS — E11.40 TYPE 2 DIABETES MELLITUS WITH DIABETIC NEUROPATHY, WITHOUT LONG-TERM CURRENT USE OF INSULIN: ICD-10-CM

## 2022-02-17 DIAGNOSIS — Z98.890 S/P FOOT SURGERY: Primary | ICD-10-CM

## 2022-02-17 DIAGNOSIS — G89.18 POST-OP PAIN: ICD-10-CM

## 2022-02-17 DIAGNOSIS — T81.41XA INFECTION OF SUPERFICIAL INCISIONAL SURGICAL SITE AFTER PROCEDURE, INITIAL ENCOUNTER: ICD-10-CM

## 2022-02-17 PROCEDURE — 99024 POSTOP FOLLOW-UP VISIT: CPT | Performed by: PODIATRIST

## 2022-02-17 RX ORDER — HYDROCODONE BITARTRATE AND ACETAMINOPHEN 5; 325 MG/1; MG/1
TABLET ORAL
Qty: 28 TABLET | Refills: 0 | Status: SHIPPED | OUTPATIENT
Start: 2022-02-17 | End: 2022-04-05

## 2022-02-17 RX ORDER — CEPHALEXIN 500 MG/1
500 CAPSULE ORAL 2 TIMES DAILY
Qty: 20 CAPSULE | Refills: 0 | Status: SHIPPED | OUTPATIENT
Start: 2022-02-17 | End: 2022-02-27

## 2022-03-01 ENCOUNTER — HOSPITAL ENCOUNTER (OUTPATIENT)
Dept: GENERAL RADIOLOGY | Facility: HOSPITAL | Age: 67
Discharge: HOME | End: 2022-03-01
Admitting: PODIATRIST

## 2022-03-01 ENCOUNTER — OFFICE VISIT (OUTPATIENT)
Dept: PODIATRY | Facility: CLINIC | Age: 67
End: 2022-03-01

## 2022-03-01 VITALS
DIASTOLIC BLOOD PRESSURE: 90 MMHG | OXYGEN SATURATION: 98 % | HEART RATE: 85 BPM | SYSTOLIC BLOOD PRESSURE: 135 MMHG | BODY MASS INDEX: 36.44 KG/M2 | WEIGHT: 198 LBS | HEIGHT: 62 IN

## 2022-03-01 DIAGNOSIS — Z98.890 S/P FOOT SURGERY: Primary | ICD-10-CM

## 2022-03-01 DIAGNOSIS — Z98.890 S/P FOOT SURGERY: ICD-10-CM

## 2022-03-01 PROCEDURE — 73630 X-RAY EXAM OF FOOT: CPT

## 2022-03-01 PROCEDURE — 99024 POSTOP FOLLOW-UP VISIT: CPT | Performed by: PODIATRIST

## 2022-03-02 ENCOUNTER — TRANSCRIBE ORDERS (OUTPATIENT)
Dept: ADMINISTRATIVE | Facility: HOSPITAL | Age: 67
End: 2022-03-02

## 2022-03-02 ENCOUNTER — TELEPHONE (OUTPATIENT)
Dept: PODIATRY | Facility: CLINIC | Age: 67
End: 2022-03-02

## 2022-03-02 DIAGNOSIS — M54.50 LUMBAR PAIN: Primary | ICD-10-CM

## 2022-03-02 RX ORDER — CEPHALEXIN 500 MG/1
500 CAPSULE ORAL 2 TIMES DAILY
Qty: 20 CAPSULE | Refills: 0 | Status: SHIPPED | OUTPATIENT
Start: 2022-03-02 | End: 2022-03-12

## 2022-03-16 ENCOUNTER — HOSPITAL ENCOUNTER (OUTPATIENT)
Dept: CT IMAGING | Facility: HOSPITAL | Age: 67
Discharge: HOME | End: 2022-03-16
Admitting: PHYSICIAN ASSISTANT

## 2022-03-16 DIAGNOSIS — M54.50 LUMBAR PAIN: ICD-10-CM

## 2022-03-16 PROCEDURE — 72131 CT LUMBAR SPINE W/O DYE: CPT

## 2022-03-17 ENCOUNTER — TELEPHONE (OUTPATIENT)
Dept: PODIATRY | Facility: CLINIC | Age: 67
End: 2022-03-17

## 2022-03-22 ENCOUNTER — OFFICE VISIT (OUTPATIENT)
Dept: PODIATRY | Facility: CLINIC | Age: 67
End: 2022-03-22

## 2022-03-22 VITALS
HEART RATE: 62 BPM | HEIGHT: 62 IN | DIASTOLIC BLOOD PRESSURE: 64 MMHG | SYSTOLIC BLOOD PRESSURE: 126 MMHG | WEIGHT: 198 LBS | BODY MASS INDEX: 36.44 KG/M2 | OXYGEN SATURATION: 99 %

## 2022-03-22 DIAGNOSIS — Z98.890 S/P FOOT SURGERY: Primary | ICD-10-CM

## 2022-03-22 PROCEDURE — 99024 POSTOP FOLLOW-UP VISIT: CPT | Performed by: PODIATRIST

## 2022-04-04 ENCOUNTER — TELEPHONE (OUTPATIENT)
Dept: PODIATRY | Facility: CLINIC | Age: 67
End: 2022-04-04

## 2022-04-05 ENCOUNTER — OFFICE VISIT (OUTPATIENT)
Dept: PODIATRY | Facility: CLINIC | Age: 67
End: 2022-04-05

## 2022-04-05 VITALS
DIASTOLIC BLOOD PRESSURE: 68 MMHG | SYSTOLIC BLOOD PRESSURE: 130 MMHG | BODY MASS INDEX: 36.07 KG/M2 | HEART RATE: 61 BPM | HEIGHT: 62 IN | OXYGEN SATURATION: 98 % | WEIGHT: 196 LBS

## 2022-04-05 DIAGNOSIS — Z98.890 S/P FOOT SURGERY: Primary | ICD-10-CM

## 2022-04-05 DIAGNOSIS — M96.89 DELAYED UNION AFTER OSTEOTOMY: ICD-10-CM

## 2022-04-05 PROCEDURE — 99024 POSTOP FOLLOW-UP VISIT: CPT | Performed by: PODIATRIST

## 2022-04-20 ENCOUNTER — HOSPITAL ENCOUNTER (OUTPATIENT)
Dept: GENERAL RADIOLOGY | Facility: HOSPITAL | Age: 67
Discharge: HOME | End: 2022-04-20
Admitting: PODIATRIST

## 2022-04-20 DIAGNOSIS — Z98.890 S/P FOOT SURGERY: ICD-10-CM

## 2022-04-20 PROCEDURE — 73630 X-RAY EXAM OF FOOT: CPT

## 2022-04-27 ENCOUNTER — TELEPHONE (OUTPATIENT)
Dept: PODIATRY | Facility: CLINIC | Age: 67
End: 2022-04-27

## 2022-05-04 ENCOUNTER — TRANSCRIBE ORDERS (OUTPATIENT)
Dept: LAB | Facility: HOSPITAL | Age: 67
End: 2022-05-04

## 2022-05-04 ENCOUNTER — APPOINTMENT (OUTPATIENT)
Dept: PREADMISSION TESTING | Facility: HOSPITAL | Age: 67
End: 2022-05-04

## 2022-05-04 ENCOUNTER — TELEPHONE (OUTPATIENT)
Dept: PODIATRY | Facility: CLINIC | Age: 67
End: 2022-05-04

## 2022-05-04 DIAGNOSIS — Z11.59 SCREENING FOR VIRAL DISEASE: Primary | ICD-10-CM

## 2022-05-05 ENCOUNTER — OFFICE VISIT (OUTPATIENT)
Dept: PODIATRY | Facility: CLINIC | Age: 67
End: 2022-05-05

## 2022-05-05 VITALS
BODY MASS INDEX: 36.07 KG/M2 | HEIGHT: 62 IN | OXYGEN SATURATION: 100 % | HEART RATE: 50 BPM | WEIGHT: 196 LBS | DIASTOLIC BLOOD PRESSURE: 90 MMHG | SYSTOLIC BLOOD PRESSURE: 132 MMHG

## 2022-05-05 DIAGNOSIS — Z98.890 S/P FOOT SURGERY: Primary | ICD-10-CM

## 2022-05-05 PROCEDURE — 99024 POSTOP FOLLOW-UP VISIT: CPT | Performed by: PODIATRIST

## 2022-05-06 ENCOUNTER — LAB (OUTPATIENT)
Dept: LAB | Facility: HOSPITAL | Age: 67
End: 2022-05-06

## 2022-05-06 ENCOUNTER — PRE-ADMISSION TESTING (OUTPATIENT)
Dept: PREADMISSION TESTING | Facility: HOSPITAL | Age: 67
End: 2022-05-06

## 2022-05-06 ENCOUNTER — HOSPITAL ENCOUNTER (OUTPATIENT)
Dept: GENERAL RADIOLOGY | Facility: HOSPITAL | Age: 67
Discharge: HOME | End: 2022-05-06

## 2022-05-06 VITALS
OXYGEN SATURATION: 100 % | HEART RATE: 57 BPM | DIASTOLIC BLOOD PRESSURE: 51 MMHG | SYSTOLIC BLOOD PRESSURE: 98 MMHG | WEIGHT: 200.62 LBS | BODY MASS INDEX: 36.92 KG/M2 | HEIGHT: 62 IN | RESPIRATION RATE: 18 BRPM

## 2022-05-06 LAB
ABO GROUP BLD: NORMAL
ALBUMIN SERPL-MCNC: 4.1 G/DL (ref 3.5–5.2)
ALBUMIN/GLOB SERPL: 1.8 G/DL
ALP SERPL-CCNC: 137 U/L (ref 39–117)
ALT SERPL W P-5'-P-CCNC: 13 U/L (ref 1–33)
ANION GAP SERPL CALCULATED.3IONS-SCNC: 9 MMOL/L (ref 5–15)
APTT PPP: 30.4 SECONDS (ref 24.1–35)
AST SERPL-CCNC: 15 U/L (ref 1–32)
BACTERIA UR QL AUTO: ABNORMAL /HPF
BASOPHILS # BLD AUTO: 0.02 10*3/MM3 (ref 0–0.2)
BASOPHILS NFR BLD AUTO: 0.4 % (ref 0–1.5)
BILIRUB SERPL-MCNC: <0.2 MG/DL (ref 0–1.2)
BILIRUB UR QL STRIP: NEGATIVE
BLD GP AB SCN SERPL QL: NEGATIVE
BUN SERPL-MCNC: 14 MG/DL (ref 8–23)
BUN/CREAT SERPL: 16.9 (ref 7–25)
CALCIUM SPEC-SCNC: 8.6 MG/DL (ref 8.6–10.5)
CHLORIDE SERPL-SCNC: 97 MMOL/L (ref 98–107)
CLARITY UR: CLEAR
CO2 SERPL-SCNC: 27 MMOL/L (ref 22–29)
COLOR UR: YELLOW
CREAT SERPL-MCNC: 0.83 MG/DL (ref 0.57–1)
DEPRECATED RDW RBC AUTO: 48.1 FL (ref 37–54)
EGFRCR SERPLBLD CKD-EPI 2021: 76.9 ML/MIN/1.73
EOSINOPHIL # BLD AUTO: 0.19 10*3/MM3 (ref 0–0.4)
EOSINOPHIL NFR BLD AUTO: 4.3 % (ref 0.3–6.2)
ERYTHROCYTE [DISTWIDTH] IN BLOOD BY AUTOMATED COUNT: 16 % (ref 12.3–15.4)
GLOBULIN UR ELPH-MCNC: 2.3 GM/DL
GLUCOSE SERPL-MCNC: 110 MG/DL (ref 65–99)
GLUCOSE UR STRIP-MCNC: NEGATIVE MG/DL
HCT VFR BLD AUTO: 31.5 % (ref 34–46.6)
HGB BLD-MCNC: 9.6 G/DL (ref 12–15.9)
HGB UR QL STRIP.AUTO: NEGATIVE
HYALINE CASTS UR QL AUTO: ABNORMAL /LPF
IMM GRANULOCYTES # BLD AUTO: 0.03 10*3/MM3 (ref 0–0.05)
IMM GRANULOCYTES NFR BLD AUTO: 0.7 % (ref 0–0.5)
INR PPP: 0.92 (ref 0.91–1.09)
KETONES UR QL STRIP: ABNORMAL
LEUKOCYTE ESTERASE UR QL STRIP.AUTO: ABNORMAL
LYMPHOCYTES # BLD AUTO: 1.43 10*3/MM3 (ref 0.7–3.1)
LYMPHOCYTES NFR BLD AUTO: 32.1 % (ref 19.6–45.3)
MCH RBC QN AUTO: 25.1 PG (ref 26.6–33)
MCHC RBC AUTO-ENTMCNC: 30.5 G/DL (ref 31.5–35.7)
MCV RBC AUTO: 82.5 FL (ref 79–97)
MONOCYTES # BLD AUTO: 0.21 10*3/MM3 (ref 0.1–0.9)
MONOCYTES NFR BLD AUTO: 4.7 % (ref 5–12)
NEUTROPHILS NFR BLD AUTO: 2.58 10*3/MM3 (ref 1.7–7)
NEUTROPHILS NFR BLD AUTO: 57.8 % (ref 42.7–76)
NITRITE UR QL STRIP: NEGATIVE
NRBC BLD AUTO-RTO: 0 /100 WBC (ref 0–0.2)
PH UR STRIP.AUTO: 6 [PH] (ref 5–8)
PLATELET # BLD AUTO: 301 10*3/MM3 (ref 140–450)
PMV BLD AUTO: 8.6 FL (ref 6–12)
POTASSIUM SERPL-SCNC: 4.7 MMOL/L (ref 3.5–5.2)
PROT SERPL-MCNC: 6.4 G/DL (ref 6–8.5)
PROT UR QL STRIP: NEGATIVE
PROTHROMBIN TIME: 12 SECONDS (ref 11.9–14.6)
RBC # BLD AUTO: 3.82 10*6/MM3 (ref 3.77–5.28)
RBC # UR STRIP: ABNORMAL /HPF
REF LAB TEST METHOD: ABNORMAL
RH BLD: POSITIVE
SARS-COV-2 ORF1AB RESP QL NAA+PROBE: NOT DETECTED
SODIUM SERPL-SCNC: 133 MMOL/L (ref 136–145)
SP GR UR STRIP: 1.02 (ref 1–1.03)
SQUAMOUS #/AREA URNS HPF: ABNORMAL /HPF
T&S EXPIRATION DATE: NORMAL
UROBILINOGEN UR QL STRIP: ABNORMAL
WBC # UR STRIP: ABNORMAL /HPF
WBC NRBC COR # BLD: 4.46 10*3/MM3 (ref 3.4–10.8)

## 2022-05-06 PROCEDURE — 80053 COMPREHEN METABOLIC PANEL: CPT

## 2022-05-06 PROCEDURE — 86901 BLOOD TYPING SEROLOGIC RH(D): CPT

## 2022-05-06 PROCEDURE — 87086 URINE CULTURE/COLONY COUNT: CPT

## 2022-05-06 PROCEDURE — 93010 ELECTROCARDIOGRAM REPORT: CPT | Performed by: INTERNAL MEDICINE

## 2022-05-06 PROCEDURE — U0004 COV-19 TEST NON-CDC HGH THRU: HCPCS | Performed by: ORTHOPAEDIC SURGERY

## 2022-05-06 PROCEDURE — 85730 THROMBOPLASTIN TIME PARTIAL: CPT

## 2022-05-06 PROCEDURE — 86850 RBC ANTIBODY SCREEN: CPT

## 2022-05-06 PROCEDURE — 86900 BLOOD TYPING SEROLOGIC ABO: CPT

## 2022-05-06 PROCEDURE — 81001 URINALYSIS AUTO W/SCOPE: CPT

## 2022-05-06 PROCEDURE — 85610 PROTHROMBIN TIME: CPT

## 2022-05-06 PROCEDURE — U0005 INFEC AGEN DETEC AMPLI PROBE: HCPCS | Performed by: ORTHOPAEDIC SURGERY

## 2022-05-06 PROCEDURE — 36415 COLL VENOUS BLD VENIPUNCTURE: CPT

## 2022-05-06 PROCEDURE — 71045 X-RAY EXAM CHEST 1 VIEW: CPT

## 2022-05-06 PROCEDURE — 93005 ELECTROCARDIOGRAM TRACING: CPT

## 2022-05-06 PROCEDURE — C9803 HOPD COVID-19 SPEC COLLECT: HCPCS

## 2022-05-06 PROCEDURE — 85025 COMPLETE CBC W/AUTO DIFF WBC: CPT

## 2022-05-07 LAB
BACTERIA SPEC AEROBE CULT: NORMAL
QT INTERVAL: 462 MS
QTC INTERVAL: 395 MS

## 2022-05-09 ENCOUNTER — APPOINTMENT (OUTPATIENT)
Dept: GENERAL RADIOLOGY | Facility: HOSPITAL | Age: 67
End: 2022-05-09

## 2022-05-09 ENCOUNTER — HOSPITAL ENCOUNTER (INPATIENT)
Facility: HOSPITAL | Age: 67
LOS: 2 days | Discharge: HOME | End: 2022-05-11
Attending: ORTHOPAEDIC SURGERY | Admitting: ORTHOPAEDIC SURGERY

## 2022-05-09 ENCOUNTER — ANESTHESIA EVENT (OUTPATIENT)
Dept: PERIOP | Facility: HOSPITAL | Age: 67
End: 2022-05-09

## 2022-05-09 ENCOUNTER — ANESTHESIA (OUTPATIENT)
Dept: PERIOP | Facility: HOSPITAL | Age: 67
End: 2022-05-09

## 2022-05-09 DIAGNOSIS — Z74.09 IMPAIRED MOBILITY: ICD-10-CM

## 2022-05-09 DIAGNOSIS — M48.02 STENOSIS, CERVICAL SPINE: Primary | ICD-10-CM

## 2022-05-09 DIAGNOSIS — M48.061 SPINAL STENOSIS OF LUMBAR REGION WITHOUT NEUROGENIC CLAUDICATION: ICD-10-CM

## 2022-05-09 LAB
GLUCOSE BLDC GLUCOMTR-MCNC: 105 MG/DL (ref 70–130)
GLUCOSE BLDC GLUCOMTR-MCNC: 114 MG/DL (ref 70–130)

## 2022-05-09 PROCEDURE — 25010000002 ONDANSETRON PER 1 MG: Performed by: NURSE ANESTHETIST, CERTIFIED REGISTERED

## 2022-05-09 PROCEDURE — 0SP304Z REMOVAL OF INTERNAL FIXATION DEVICE FROM LUMBOSACRAL JOINT, OPEN APPROACH: ICD-10-PCS | Performed by: ORTHOPAEDIC SURGERY

## 2022-05-09 PROCEDURE — C1713 ANCHOR/SCREW BN/BN,TIS/BN: HCPCS | Performed by: ORTHOPAEDIC SURGERY

## 2022-05-09 PROCEDURE — 25010000002 PROPOFOL 10 MG/ML EMULSION: Performed by: NURSE ANESTHETIST, CERTIFIED REGISTERED

## 2022-05-09 PROCEDURE — 76000 FLUOROSCOPY <1 HR PHYS/QHP: CPT

## 2022-05-09 PROCEDURE — 0SG3071 FUSION OF LUMBOSACRAL JOINT WITH AUTOLOGOUS TISSUE SUBSTITUTE, POSTERIOR APPROACH, POSTERIOR COLUMN, OPEN APPROACH: ICD-10-PCS | Performed by: ORTHOPAEDIC SURGERY

## 2022-05-09 PROCEDURE — 4A11X4G MONITORING OF PERIPHERAL NERVOUS ELECTRICAL ACTIVITY, INTRAOPERATIVE, EXTERNAL APPROACH: ICD-10-PCS | Performed by: ORTHOPAEDIC SURGERY

## 2022-05-09 PROCEDURE — 94799 UNLISTED PULMONARY SVC/PX: CPT

## 2022-05-09 PROCEDURE — 25010000002 CEFAZOLIN PER 500 MG: Performed by: ORTHOPAEDIC SURGERY

## 2022-05-09 PROCEDURE — 25010000002 FENTANYL CITRATE (PF) 100 MCG/2ML SOLUTION: Performed by: NURSE ANESTHETIST, CERTIFIED REGISTERED

## 2022-05-09 PROCEDURE — 25010000002 ALBUMIN HUMAN 5% PER 50 ML: Performed by: NURSE ANESTHETIST, CERTIFIED REGISTERED

## 2022-05-09 PROCEDURE — 25010000002 HYDROMORPHONE PER 4 MG: Performed by: ANESTHESIOLOGY

## 2022-05-09 PROCEDURE — 82962 GLUCOSE BLOOD TEST: CPT

## 2022-05-09 PROCEDURE — C9290 INJ, BUPIVACAINE LIPOSOME: HCPCS | Performed by: ORTHOPAEDIC SURGERY

## 2022-05-09 PROCEDURE — 0 BUPIVACAINE LIPOSOME 1.3 % SUSPENSION 20 ML VIAL: Performed by: ORTHOPAEDIC SURGERY

## 2022-05-09 PROCEDURE — P9041 ALBUMIN (HUMAN),5%, 50ML: HCPCS | Performed by: NURSE ANESTHETIST, CERTIFIED REGISTERED

## 2022-05-09 PROCEDURE — 25010000002 FENTANYL CITRATE (PF) 50 MCG/ML SOLUTION: Performed by: ANESTHESIOLOGY

## 2022-05-09 PROCEDURE — 94640 AIRWAY INHALATION TREATMENT: CPT

## 2022-05-09 PROCEDURE — 25010000002 DROPERIDOL PER 5 MG: Performed by: ANESTHESIOLOGY

## 2022-05-09 PROCEDURE — 25010000002 DEXAMETHASONE PER 1 MG: Performed by: NURSE ANESTHETIST, CERTIFIED REGISTERED

## 2022-05-09 PROCEDURE — 63710000001 DIPHENHYDRAMINE PER 50 MG: Performed by: ORTHOPAEDIC SURGERY

## 2022-05-09 PROCEDURE — 72100 X-RAY EXAM L-S SPINE 2/3 VWS: CPT

## 2022-05-09 DEVICE — ALLOGRFT FLD BIOBURST 1ML: Type: IMPLANTABLE DEVICE | Site: SPINE LUMBAR | Status: FUNCTIONAL

## 2022-05-09 DEVICE — BONE FILLER VOID NANOSS BIOACTIVE 3D 20CC: Type: IMPLANTABLE DEVICE | Site: SPINE LUMBAR | Status: FUNCTIONAL

## 2022-05-09 DEVICE — KT HEMOST ABS SURGIFOAM PORCN 1GRAM: Type: IMPLANTABLE DEVICE | Site: SPINE LUMBAR | Status: FUNCTIONAL

## 2022-05-09 RX ORDER — OXYCODONE AND ACETAMINOPHEN 10; 325 MG/1; MG/1
1 TABLET ORAL ONCE AS NEEDED
Status: COMPLETED | OUTPATIENT
Start: 2022-05-09 | End: 2022-05-09

## 2022-05-09 RX ORDER — ONDANSETRON 2 MG/ML
INJECTION INTRAMUSCULAR; INTRAVENOUS AS NEEDED
Status: DISCONTINUED | OUTPATIENT
Start: 2022-05-09 | End: 2022-05-09 | Stop reason: SURG

## 2022-05-09 RX ORDER — SODIUM CHLORIDE 9 MG/ML
INJECTION, SOLUTION INTRAVENOUS AS NEEDED
Status: DISCONTINUED | OUTPATIENT
Start: 2022-05-09 | End: 2022-05-09 | Stop reason: HOSPADM

## 2022-05-09 RX ORDER — SODIUM CHLORIDE 0.9 % (FLUSH) 0.9 %
10 SYRINGE (ML) INJECTION AS NEEDED
Status: DISCONTINUED | OUTPATIENT
Start: 2022-05-09 | End: 2022-05-09 | Stop reason: HOSPADM

## 2022-05-09 RX ORDER — OXYBUTYNIN CHLORIDE 5 MG/1
5 TABLET ORAL 2 TIMES DAILY
Status: DISCONTINUED | OUTPATIENT
Start: 2022-05-09 | End: 2022-05-11 | Stop reason: HOSPADM

## 2022-05-09 RX ORDER — MIRTAZAPINE 15 MG/1
7.5 TABLET, FILM COATED ORAL NIGHTLY
Status: DISCONTINUED | OUTPATIENT
Start: 2022-05-09 | End: 2022-05-11 | Stop reason: HOSPADM

## 2022-05-09 RX ORDER — ROCURONIUM BROMIDE 10 MG/ML
INJECTION, SOLUTION INTRAVENOUS AS NEEDED
Status: DISCONTINUED | OUTPATIENT
Start: 2022-05-09 | End: 2022-05-09 | Stop reason: SURG

## 2022-05-09 RX ORDER — ONDANSETRON 2 MG/ML
4 INJECTION INTRAMUSCULAR; INTRAVENOUS AS NEEDED
Status: DISCONTINUED | OUTPATIENT
Start: 2022-05-09 | End: 2022-05-09 | Stop reason: HOSPADM

## 2022-05-09 RX ORDER — HYDROMORPHONE HYDROCHLORIDE 1 MG/ML
0.5 INJECTION, SOLUTION INTRAMUSCULAR; INTRAVENOUS; SUBCUTANEOUS
Status: DISCONTINUED | OUTPATIENT
Start: 2022-05-09 | End: 2022-05-09 | Stop reason: HOSPADM

## 2022-05-09 RX ORDER — FENTANYL CITRATE 50 UG/ML
INJECTION, SOLUTION INTRAMUSCULAR; INTRAVENOUS AS NEEDED
Status: DISCONTINUED | OUTPATIENT
Start: 2022-05-09 | End: 2022-05-09 | Stop reason: SURG

## 2022-05-09 RX ORDER — DROPERIDOL 2.5 MG/ML
0.62 INJECTION, SOLUTION INTRAMUSCULAR; INTRAVENOUS ONCE AS NEEDED
Status: COMPLETED | OUTPATIENT
Start: 2022-05-09 | End: 2022-05-09

## 2022-05-09 RX ORDER — ONDANSETRON 2 MG/ML
4 INJECTION INTRAMUSCULAR; INTRAVENOUS EVERY 6 HOURS PRN
Status: DISCONTINUED | OUTPATIENT
Start: 2022-05-09 | End: 2022-05-11 | Stop reason: HOSPADM

## 2022-05-09 RX ORDER — ALBUMIN, HUMAN INJ 5% 5 %
SOLUTION INTRAVENOUS CONTINUOUS PRN
Status: DISCONTINUED | OUTPATIENT
Start: 2022-05-09 | End: 2022-05-09 | Stop reason: SURG

## 2022-05-09 RX ORDER — FENTANYL CITRATE 50 UG/ML
25 INJECTION, SOLUTION INTRAMUSCULAR; INTRAVENOUS
Status: DISCONTINUED | OUTPATIENT
Start: 2022-05-09 | End: 2022-05-09 | Stop reason: HOSPADM

## 2022-05-09 RX ORDER — SODIUM CHLORIDE 0.9 % (FLUSH) 0.9 %
3 SYRINGE (ML) INJECTION AS NEEDED
Status: DISCONTINUED | OUTPATIENT
Start: 2022-05-09 | End: 2022-05-09 | Stop reason: HOSPADM

## 2022-05-09 RX ORDER — NALOXONE HCL 0.4 MG/ML
0.04 VIAL (ML) INJECTION AS NEEDED
Status: DISCONTINUED | OUTPATIENT
Start: 2022-05-09 | End: 2022-05-09 | Stop reason: HOSPADM

## 2022-05-09 RX ORDER — ONDANSETRON 4 MG/1
4 TABLET, FILM COATED ORAL EVERY 6 HOURS PRN
Status: DISCONTINUED | OUTPATIENT
Start: 2022-05-09 | End: 2022-05-11 | Stop reason: HOSPADM

## 2022-05-09 RX ORDER — OXYCODONE AND ACETAMINOPHEN 10; 325 MG/1; MG/1
1 TABLET ORAL EVERY 4 HOURS PRN
Status: DISCONTINUED | OUTPATIENT
Start: 2022-05-09 | End: 2022-05-11 | Stop reason: HOSPADM

## 2022-05-09 RX ORDER — PHENYLEPHRINE HCL IN 0.9% NACL 1 MG/10 ML
SYRINGE (ML) INTRAVENOUS AS NEEDED
Status: DISCONTINUED | OUTPATIENT
Start: 2022-05-09 | End: 2022-05-09 | Stop reason: SURG

## 2022-05-09 RX ORDER — SUMATRIPTAN 50 MG/1
100 TABLET, FILM COATED ORAL AS NEEDED
Status: DISCONTINUED | OUTPATIENT
Start: 2022-05-09 | End: 2022-05-11 | Stop reason: HOSPADM

## 2022-05-09 RX ORDER — ARIPIPRAZOLE 10 MG/1
20 TABLET ORAL DAILY
Status: DISCONTINUED | OUTPATIENT
Start: 2022-05-10 | End: 2022-05-11 | Stop reason: HOSPADM

## 2022-05-09 RX ORDER — TIZANIDINE 4 MG/1
4 TABLET ORAL EVERY 8 HOURS PRN
Status: DISCONTINUED | OUTPATIENT
Start: 2022-05-09 | End: 2022-05-11 | Stop reason: HOSPADM

## 2022-05-09 RX ORDER — SODIUM CHLORIDE 9 MG/ML
75 INJECTION, SOLUTION INTRAVENOUS CONTINUOUS
Status: DISPENSED | OUTPATIENT
Start: 2022-05-09 | End: 2022-05-10

## 2022-05-09 RX ORDER — DIPHENHYDRAMINE HCL 25 MG
25 CAPSULE ORAL NIGHTLY PRN
Status: DISCONTINUED | OUTPATIENT
Start: 2022-05-09 | End: 2022-05-11 | Stop reason: HOSPADM

## 2022-05-09 RX ORDER — SODIUM CHLORIDE, SODIUM LACTATE, POTASSIUM CHLORIDE, CALCIUM CHLORIDE 600; 310; 30; 20 MG/100ML; MG/100ML; MG/100ML; MG/100ML
1000 INJECTION, SOLUTION INTRAVENOUS CONTINUOUS
Status: DISPENSED | OUTPATIENT
Start: 2022-05-09 | End: 2022-05-11

## 2022-05-09 RX ORDER — ONDANSETRON 2 MG/ML
4 INJECTION INTRAMUSCULAR; INTRAVENOUS EVERY 6 HOURS PRN
Status: DISCONTINUED | OUTPATIENT
Start: 2022-05-09 | End: 2022-05-09 | Stop reason: SDUPTHER

## 2022-05-09 RX ORDER — FAMOTIDINE 10 MG/ML
20 INJECTION, SOLUTION INTRAVENOUS EVERY 12 HOURS SCHEDULED
Status: DISCONTINUED | OUTPATIENT
Start: 2022-05-09 | End: 2022-05-11 | Stop reason: HOSPADM

## 2022-05-09 RX ORDER — SODIUM CHLORIDE 9 MG/ML
75 INJECTION, SOLUTION INTRAVENOUS CONTINUOUS
Status: DISCONTINUED | OUTPATIENT
Start: 2022-05-09 | End: 2022-05-11 | Stop reason: HOSPADM

## 2022-05-09 RX ORDER — IBUPROFEN 600 MG/1
600 TABLET ORAL ONCE AS NEEDED
Status: DISCONTINUED | OUTPATIENT
Start: 2022-05-09 | End: 2022-05-09 | Stop reason: HOSPADM

## 2022-05-09 RX ORDER — LIDOCAINE HYDROCHLORIDE 10 MG/ML
0.5 INJECTION, SOLUTION EPIDURAL; INFILTRATION; INTRACAUDAL; PERINEURAL ONCE AS NEEDED
Status: DISCONTINUED | OUTPATIENT
Start: 2022-05-09 | End: 2022-05-09 | Stop reason: HOSPADM

## 2022-05-09 RX ORDER — MAGNESIUM HYDROXIDE 1200 MG/15ML
LIQUID ORAL AS NEEDED
Status: DISCONTINUED | OUTPATIENT
Start: 2022-05-09 | End: 2022-05-09 | Stop reason: HOSPADM

## 2022-05-09 RX ORDER — ESCITALOPRAM OXALATE 10 MG/1
5 TABLET ORAL DAILY
Status: DISCONTINUED | OUTPATIENT
Start: 2022-05-10 | End: 2022-05-11 | Stop reason: HOSPADM

## 2022-05-09 RX ORDER — DEXTROSE MONOHYDRATE 25 G/50ML
12.5 INJECTION, SOLUTION INTRAVENOUS AS NEEDED
Status: DISCONTINUED | OUTPATIENT
Start: 2022-05-09 | End: 2022-05-09 | Stop reason: HOSPADM

## 2022-05-09 RX ORDER — OXCARBAZEPINE 300 MG/1
300 TABLET, FILM COATED ORAL 3 TIMES DAILY
Status: DISCONTINUED | OUTPATIENT
Start: 2022-05-09 | End: 2022-05-11 | Stop reason: HOSPADM

## 2022-05-09 RX ORDER — NEOSTIGMINE METHYLSULFATE 5 MG/5 ML
SYRINGE (ML) INTRAVENOUS AS NEEDED
Status: DISCONTINUED | OUTPATIENT
Start: 2022-05-09 | End: 2022-05-09 | Stop reason: SURG

## 2022-05-09 RX ORDER — SODIUM CHLORIDE 0.9 % (FLUSH) 0.9 %
10 SYRINGE (ML) INJECTION EVERY 12 HOURS SCHEDULED
Status: DISCONTINUED | OUTPATIENT
Start: 2022-05-09 | End: 2022-05-09 | Stop reason: HOSPADM

## 2022-05-09 RX ORDER — SODIUM CHLORIDE, SODIUM LACTATE, POTASSIUM CHLORIDE, CALCIUM CHLORIDE 600; 310; 30; 20 MG/100ML; MG/100ML; MG/100ML; MG/100ML
100 INJECTION, SOLUTION INTRAVENOUS CONTINUOUS PRN
Status: DISCONTINUED | OUTPATIENT
Start: 2022-05-09 | End: 2022-05-11 | Stop reason: HOSPADM

## 2022-05-09 RX ORDER — DEXAMETHASONE SODIUM PHOSPHATE 4 MG/ML
INJECTION, SOLUTION INTRA-ARTICULAR; INTRALESIONAL; INTRAMUSCULAR; INTRAVENOUS; SOFT TISSUE AS NEEDED
Status: DISCONTINUED | OUTPATIENT
Start: 2022-05-09 | End: 2022-05-09 | Stop reason: SURG

## 2022-05-09 RX ORDER — TRIHEXYPHENIDYL HYDROCHLORIDE 2 MG/1
1 TABLET ORAL 3 TIMES DAILY
Status: DISCONTINUED | OUTPATIENT
Start: 2022-05-09 | End: 2022-05-11 | Stop reason: HOSPADM

## 2022-05-09 RX ORDER — DONEPEZIL HYDROCHLORIDE 5 MG/1
5 TABLET, FILM COATED ORAL NIGHTLY
Status: DISCONTINUED | OUTPATIENT
Start: 2022-05-09 | End: 2022-05-11 | Stop reason: HOSPADM

## 2022-05-09 RX ORDER — LABETALOL HYDROCHLORIDE 5 MG/ML
5 INJECTION, SOLUTION INTRAVENOUS
Status: DISCONTINUED | OUTPATIENT
Start: 2022-05-09 | End: 2022-05-09 | Stop reason: HOSPADM

## 2022-05-09 RX ORDER — SODIUM CHLORIDE 0.9 % (FLUSH) 0.9 %
3 SYRINGE (ML) INJECTION EVERY 12 HOURS SCHEDULED
Status: DISCONTINUED | OUTPATIENT
Start: 2022-05-09 | End: 2022-05-11 | Stop reason: HOSPADM

## 2022-05-09 RX ORDER — LEVOTHYROXINE SODIUM 0.07 MG/1
75 TABLET ORAL
Status: DISCONTINUED | OUTPATIENT
Start: 2022-05-10 | End: 2022-05-11 | Stop reason: HOSPADM

## 2022-05-09 RX ORDER — EPHEDRINE SULFATE 50 MG/ML
INJECTION, SOLUTION INTRAVENOUS AS NEEDED
Status: DISCONTINUED | OUTPATIENT
Start: 2022-05-09 | End: 2022-05-09 | Stop reason: SURG

## 2022-05-09 RX ORDER — BUDESONIDE AND FORMOTEROL FUMARATE DIHYDRATE 80; 4.5 UG/1; UG/1
2 AEROSOL RESPIRATORY (INHALATION)
Status: DISCONTINUED | OUTPATIENT
Start: 2022-05-09 | End: 2022-05-11 | Stop reason: HOSPADM

## 2022-05-09 RX ORDER — LIDOCAINE HYDROCHLORIDE 20 MG/ML
INJECTION, SOLUTION EPIDURAL; INFILTRATION; INTRACAUDAL; PERINEURAL AS NEEDED
Status: DISCONTINUED | OUTPATIENT
Start: 2022-05-09 | End: 2022-05-09 | Stop reason: SURG

## 2022-05-09 RX ORDER — KETAMINE HCL IN NACL, ISO-OSM 100MG/10ML
SYRINGE (ML) INJECTION AS NEEDED
Status: DISCONTINUED | OUTPATIENT
Start: 2022-05-09 | End: 2022-05-09 | Stop reason: SURG

## 2022-05-09 RX ORDER — SODIUM CHLORIDE 0.9 % (FLUSH) 0.9 %
10 SYRINGE (ML) INJECTION AS NEEDED
Status: DISCONTINUED | OUTPATIENT
Start: 2022-05-09 | End: 2022-05-11 | Stop reason: HOSPADM

## 2022-05-09 RX ORDER — FAMOTIDINE 20 MG/1
20 TABLET, FILM COATED ORAL EVERY 12 HOURS SCHEDULED
Status: DISCONTINUED | OUTPATIENT
Start: 2022-05-09 | End: 2022-05-11 | Stop reason: HOSPADM

## 2022-05-09 RX ORDER — PROPOFOL 10 MG/ML
VIAL (ML) INTRAVENOUS AS NEEDED
Status: DISCONTINUED | OUTPATIENT
Start: 2022-05-09 | End: 2022-05-09 | Stop reason: SURG

## 2022-05-09 RX ORDER — FLUMAZENIL 0.1 MG/ML
0.2 INJECTION INTRAVENOUS AS NEEDED
Status: DISCONTINUED | OUTPATIENT
Start: 2022-05-09 | End: 2022-05-09 | Stop reason: HOSPADM

## 2022-05-09 RX ORDER — HYDROXYZINE HYDROCHLORIDE 25 MG/1
25 TABLET, FILM COATED ORAL 2 TIMES DAILY PRN
Status: DISCONTINUED | OUTPATIENT
Start: 2022-05-09 | End: 2022-05-11 | Stop reason: HOSPADM

## 2022-05-09 RX ORDER — GABAPENTIN 400 MG/1
800 CAPSULE ORAL EVERY 8 HOURS SCHEDULED
Status: DISCONTINUED | OUTPATIENT
Start: 2022-05-09 | End: 2022-05-11 | Stop reason: HOSPADM

## 2022-05-09 RX ORDER — SODIUM CHLORIDE, SODIUM LACTATE, POTASSIUM CHLORIDE, CALCIUM CHLORIDE 600; 310; 30; 20 MG/100ML; MG/100ML; MG/100ML; MG/100ML
9 INJECTION, SOLUTION INTRAVENOUS CONTINUOUS
Status: DISCONTINUED | OUTPATIENT
Start: 2022-05-09 | End: 2022-05-11 | Stop reason: HOSPADM

## 2022-05-09 RX ORDER — OXYCODONE HCL 20 MG/1
20 TABLET, FILM COATED, EXTENDED RELEASE ORAL ONCE
Status: COMPLETED | OUTPATIENT
Start: 2022-05-09 | End: 2022-05-09

## 2022-05-09 RX ADMIN — TRIHEXYPHENIDYL HYDROCHLORIDE 1 MG: 2 TABLET ORAL at 20:55

## 2022-05-09 RX ADMIN — CEFAZOLIN SODIUM 1 G: 10 INJECTION, POWDER, FOR SOLUTION INTRAVENOUS at 18:16

## 2022-05-09 RX ADMIN — SODIUM BICARBONATE 25 MEQ: 84 INJECTION INTRAVENOUS at 10:25

## 2022-05-09 RX ADMIN — GLYCOPYRROLATE 0.2 MG: 0.2 INJECTION INTRAMUSCULAR; INTRAVENOUS at 09:39

## 2022-05-09 RX ADMIN — MIRTAZAPINE 7.5 MG: 15 TABLET, FILM COATED ORAL at 20:56

## 2022-05-09 RX ADMIN — DROPERIDOL 0.62 MG: 2.5 INJECTION, SOLUTION INTRAMUSCULAR; INTRAVENOUS at 12:38

## 2022-05-09 RX ADMIN — FENTANYL CITRATE 25 MCG: 0.05 INJECTION, SOLUTION INTRAMUSCULAR; INTRAVENOUS at 09:23

## 2022-05-09 RX ADMIN — DONEPEZIL HYDROCHLORIDE 5 MG: 5 TABLET, FILM COATED ORAL at 20:55

## 2022-05-09 RX ADMIN — GLYCOPYRROLATE 0.8 MG: 0.2 INJECTION INTRAMUSCULAR; INTRAVENOUS at 11:35

## 2022-05-09 RX ADMIN — ROCURONIUM BROMIDE 50 MG: 10 SOLUTION INTRAVENOUS at 09:36

## 2022-05-09 RX ADMIN — DIPHENHYDRAMINE HYDROCHLORIDE 25 MG: 25 CAPSULE ORAL at 22:01

## 2022-05-09 RX ADMIN — SUGAMMADEX 200 MG: 100 INJECTION, SOLUTION INTRAVENOUS at 12:15

## 2022-05-09 RX ADMIN — Medication 2 G: at 09:33

## 2022-05-09 RX ADMIN — FAMOTIDINE 20 MG: 20 TABLET, FILM COATED ORAL at 20:55

## 2022-05-09 RX ADMIN — GABAPENTIN 800 MG: 400 CAPSULE ORAL at 22:01

## 2022-05-09 RX ADMIN — LIDOCAINE HYDROCHLORIDE 100 MG: 20 INJECTION, SOLUTION EPIDURAL; INFILTRATION; INTRACAUDAL; PERINEURAL at 09:36

## 2022-05-09 RX ADMIN — SODIUM CHLORIDE, POTASSIUM CHLORIDE, SODIUM LACTATE AND CALCIUM CHLORIDE 1000 ML: 600; 310; 30; 20 INJECTION, SOLUTION INTRAVENOUS at 13:20

## 2022-05-09 RX ADMIN — SODIUM CHLORIDE, POTASSIUM CHLORIDE, SODIUM LACTATE AND CALCIUM CHLORIDE 1000 ML: 600; 310; 30; 20 INJECTION, SOLUTION INTRAVENOUS at 09:03

## 2022-05-09 RX ADMIN — DEXAMETHASONE SODIUM PHOSPHATE 4 MG: 4 INJECTION, SOLUTION INTRA-ARTICULAR; INTRALESIONAL; INTRAMUSCULAR; INTRAVENOUS; SOFT TISSUE at 10:00

## 2022-05-09 RX ADMIN — FENTANYL CITRATE 100 MCG: 50 INJECTION, SOLUTION INTRAMUSCULAR; INTRAVENOUS at 09:43

## 2022-05-09 RX ADMIN — OXCARBAZEPINE 300 MG: 300 TABLET, FILM COATED ORAL at 20:55

## 2022-05-09 RX ADMIN — ALBUMIN HUMAN: 0.05 INJECTION, SOLUTION INTRAVENOUS at 10:24

## 2022-05-09 RX ADMIN — Medication 100 MCG: at 09:52

## 2022-05-09 RX ADMIN — OXYCODONE HYDROCHLORIDE AND ACETAMINOPHEN 1 TABLET: 10; 325 TABLET ORAL at 13:26

## 2022-05-09 RX ADMIN — PROPOFOL 150 MG: 10 INJECTION, EMULSION INTRAVENOUS at 09:36

## 2022-05-09 RX ADMIN — SODIUM CHLORIDE, POTASSIUM CHLORIDE, SODIUM LACTATE AND CALCIUM CHLORIDE 1000 ML: 600; 310; 30; 20 INJECTION, SOLUTION INTRAVENOUS at 09:08

## 2022-05-09 RX ADMIN — Medication 5 MG: at 11:35

## 2022-05-09 RX ADMIN — DEXMEDETOMIDINE HYDROCHLORIDE IN 0.9% SODIUM CHLORIDE 20 MCG: 4 INJECTION INTRAVENOUS at 09:30

## 2022-05-09 RX ADMIN — ONDANSETRON 4 MG: 2 INJECTION INTRAMUSCULAR; INTRAVENOUS at 11:30

## 2022-05-09 RX ADMIN — EPHEDRINE SULFATE 25 MG: 50 INJECTION INTRAVENOUS at 09:51

## 2022-05-09 RX ADMIN — Medication 50 MG: at 09:39

## 2022-05-09 RX ADMIN — BUDESONIDE AND FORMOTEROL FUMARATE DIHYDRATE 2 PUFF: 80; 4.5 AEROSOL RESPIRATORY (INHALATION) at 19:36

## 2022-05-09 RX ADMIN — HYDROMORPHONE HYDROCHLORIDE 0.5 MG: 1 INJECTION, SOLUTION INTRAMUSCULAR; INTRAVENOUS; SUBCUTANEOUS at 12:33

## 2022-05-09 RX ADMIN — ROCURONIUM BROMIDE 20 MG: 10 SOLUTION INTRAVENOUS at 10:12

## 2022-05-09 RX ADMIN — SODIUM CHLORIDE 75 ML/HR: 9 INJECTION, SOLUTION INTRAVENOUS at 18:16

## 2022-05-09 RX ADMIN — METFORMIN HYDROCHLORIDE 500 MG: 500 TABLET ORAL at 20:56

## 2022-05-09 RX ADMIN — EPHEDRINE SULFATE 25 MG: 50 INJECTION INTRAVENOUS at 10:43

## 2022-05-09 RX ADMIN — SODIUM CHLORIDE, POTASSIUM CHLORIDE, SODIUM LACTATE AND CALCIUM CHLORIDE 1000 ML: 600; 310; 30; 20 INJECTION, SOLUTION INTRAVENOUS at 15:40

## 2022-05-09 RX ADMIN — OXYBUTYNIN CHLORIDE 5 MG: 5 TABLET ORAL at 20:56

## 2022-05-09 RX ADMIN — OXYCODONE HYDROCHLORIDE 20 MG: 20 TABLET, FILM COATED, EXTENDED RELEASE ORAL at 09:03

## 2022-05-09 RX ADMIN — OXYCODONE AND ACETAMINOPHEN 1 TABLET: 325; 10 TABLET ORAL at 22:01

## 2022-05-10 LAB
ANION GAP SERPL CALCULATED.3IONS-SCNC: 9 MMOL/L (ref 5–15)
BASOPHILS # BLD AUTO: 0.01 10*3/MM3 (ref 0–0.2)
BASOPHILS NFR BLD AUTO: 0.2 % (ref 0–1.5)
BUN SERPL-MCNC: 8 MG/DL (ref 8–23)
BUN/CREAT SERPL: 11 (ref 7–25)
CALCIUM SPEC-SCNC: 8.2 MG/DL (ref 8.6–10.5)
CHLORIDE SERPL-SCNC: 100 MMOL/L (ref 98–107)
CO2 SERPL-SCNC: 28 MMOL/L (ref 22–29)
CREAT SERPL-MCNC: 0.73 MG/DL (ref 0.57–1)
DEPRECATED RDW RBC AUTO: 54.1 FL (ref 37–54)
EGFRCR SERPLBLD CKD-EPI 2021: 89.7 ML/MIN/1.73
EOSINOPHIL # BLD AUTO: 0.15 10*3/MM3 (ref 0–0.4)
EOSINOPHIL NFR BLD AUTO: 2.6 % (ref 0.3–6.2)
ERYTHROCYTE [DISTWIDTH] IN BLOOD BY AUTOMATED COUNT: 16.6 % (ref 12.3–15.4)
GLUCOSE SERPL-MCNC: 117 MG/DL (ref 65–99)
HCT VFR BLD AUTO: 28.6 % (ref 34–46.6)
HGB BLD-MCNC: 8.3 G/DL (ref 12–15.9)
IMM GRANULOCYTES # BLD AUTO: 0.02 10*3/MM3 (ref 0–0.05)
IMM GRANULOCYTES NFR BLD AUTO: 0.3 % (ref 0–0.5)
LYMPHOCYTES # BLD AUTO: 0.88 10*3/MM3 (ref 0.7–3.1)
LYMPHOCYTES NFR BLD AUTO: 15.3 % (ref 19.6–45.3)
MCH RBC QN AUTO: 25.6 PG (ref 26.6–33)
MCHC RBC AUTO-ENTMCNC: 29 G/DL (ref 31.5–35.7)
MCV RBC AUTO: 88.3 FL (ref 79–97)
MONOCYTES # BLD AUTO: 0.32 10*3/MM3 (ref 0.1–0.9)
MONOCYTES NFR BLD AUTO: 5.6 % (ref 5–12)
NEUTROPHILS NFR BLD AUTO: 4.37 10*3/MM3 (ref 1.7–7)
NEUTROPHILS NFR BLD AUTO: 76 % (ref 42.7–76)
NRBC BLD AUTO-RTO: 0 /100 WBC (ref 0–0.2)
PLATELET # BLD AUTO: 228 10*3/MM3 (ref 140–450)
PMV BLD AUTO: 8.8 FL (ref 6–12)
POTASSIUM SERPL-SCNC: 4.2 MMOL/L (ref 3.5–5.2)
RBC # BLD AUTO: 3.24 10*6/MM3 (ref 3.77–5.28)
SODIUM SERPL-SCNC: 137 MMOL/L (ref 136–145)
WBC NRBC COR # BLD: 5.75 10*3/MM3 (ref 3.4–10.8)

## 2022-05-10 PROCEDURE — 94664 DEMO&/EVAL PT USE INHALER: CPT

## 2022-05-10 PROCEDURE — 85025 COMPLETE CBC W/AUTO DIFF WBC: CPT | Performed by: ORTHOPAEDIC SURGERY

## 2022-05-10 PROCEDURE — 97161 PT EVAL LOW COMPLEX 20 MIN: CPT

## 2022-05-10 PROCEDURE — 97166 OT EVAL MOD COMPLEX 45 MIN: CPT

## 2022-05-10 PROCEDURE — 80048 BASIC METABOLIC PNL TOTAL CA: CPT | Performed by: ORTHOPAEDIC SURGERY

## 2022-05-10 PROCEDURE — 25010000002 CEFAZOLIN PER 500 MG: Performed by: ORTHOPAEDIC SURGERY

## 2022-05-10 PROCEDURE — 94799 UNLISTED PULMONARY SVC/PX: CPT

## 2022-05-10 PROCEDURE — 97116 GAIT TRAINING THERAPY: CPT

## 2022-05-10 RX ADMIN — CEFAZOLIN SODIUM 1 G: 10 INJECTION, POWDER, FOR SOLUTION INTRAVENOUS at 11:36

## 2022-05-10 RX ADMIN — OXCARBAZEPINE 300 MG: 300 TABLET, FILM COATED ORAL at 21:33

## 2022-05-10 RX ADMIN — FAMOTIDINE 20 MG: 20 TABLET, FILM COATED ORAL at 21:33

## 2022-05-10 RX ADMIN — OXYCODONE AND ACETAMINOPHEN 1 TABLET: 325; 10 TABLET ORAL at 01:51

## 2022-05-10 RX ADMIN — TRIHEXYPHENIDYL HYDROCHLORIDE 1 MG: 2 TABLET ORAL at 16:43

## 2022-05-10 RX ADMIN — BUDESONIDE AND FORMOTEROL FUMARATE DIHYDRATE 2 PUFF: 80; 4.5 AEROSOL RESPIRATORY (INHALATION) at 06:26

## 2022-05-10 RX ADMIN — HYDROXYZINE HYDROCHLORIDE 25 MG: 25 TABLET ORAL at 01:51

## 2022-05-10 RX ADMIN — SUMATRIPTAN SUCCINATE 100 MG: 50 TABLET, FILM COATED ORAL at 13:19

## 2022-05-10 RX ADMIN — LEVOTHYROXINE SODIUM 75 MCG: 75 TABLET ORAL at 06:04

## 2022-05-10 RX ADMIN — OXYCODONE AND ACETAMINOPHEN 1 TABLET: 325; 10 TABLET ORAL at 21:21

## 2022-05-10 RX ADMIN — DONEPEZIL HYDROCHLORIDE 5 MG: 5 TABLET, FILM COATED ORAL at 21:33

## 2022-05-10 RX ADMIN — GABAPENTIN 800 MG: 400 CAPSULE ORAL at 21:33

## 2022-05-10 RX ADMIN — GABAPENTIN 800 MG: 400 CAPSULE ORAL at 13:19

## 2022-05-10 RX ADMIN — ESCITALOPRAM 5 MG: 10 TABLET, FILM COATED ORAL at 08:32

## 2022-05-10 RX ADMIN — FAMOTIDINE 20 MG: 20 TABLET, FILM COATED ORAL at 08:32

## 2022-05-10 RX ADMIN — METFORMIN HYDROCHLORIDE 500 MG: 500 TABLET ORAL at 17:45

## 2022-05-10 RX ADMIN — OXYBUTYNIN CHLORIDE 5 MG: 5 TABLET ORAL at 08:32

## 2022-05-10 RX ADMIN — OXYCODONE AND ACETAMINOPHEN 1 TABLET: 325; 10 TABLET ORAL at 06:04

## 2022-05-10 RX ADMIN — OXCARBAZEPINE 300 MG: 300 TABLET, FILM COATED ORAL at 08:31

## 2022-05-10 RX ADMIN — OXYBUTYNIN CHLORIDE 5 MG: 5 TABLET ORAL at 21:33

## 2022-05-10 RX ADMIN — CEFAZOLIN SODIUM 1 G: 10 INJECTION, POWDER, FOR SOLUTION INTRAVENOUS at 01:51

## 2022-05-10 RX ADMIN — OXYCODONE AND ACETAMINOPHEN 1 TABLET: 325; 10 TABLET ORAL at 13:19

## 2022-05-10 RX ADMIN — Medication 3 ML: at 08:31

## 2022-05-10 RX ADMIN — TRIHEXYPHENIDYL HYDROCHLORIDE 1 MG: 2 TABLET ORAL at 21:33

## 2022-05-10 RX ADMIN — MIRTAZAPINE 7.5 MG: 15 TABLET, FILM COATED ORAL at 21:33

## 2022-05-10 RX ADMIN — ARIPIPRAZOLE 20 MG: 10 TABLET ORAL at 08:32

## 2022-05-10 RX ADMIN — Medication 3 ML: at 21:33

## 2022-05-10 RX ADMIN — OXCARBAZEPINE 300 MG: 300 TABLET, FILM COATED ORAL at 16:43

## 2022-05-10 RX ADMIN — METFORMIN HYDROCHLORIDE 500 MG: 500 TABLET ORAL at 08:31

## 2022-05-10 RX ADMIN — GABAPENTIN 800 MG: 400 CAPSULE ORAL at 06:04

## 2022-05-10 RX ADMIN — TRIHEXYPHENIDYL HYDROCHLORIDE 1 MG: 2 TABLET ORAL at 08:32

## 2022-05-10 RX ADMIN — BUDESONIDE AND FORMOTEROL FUMARATE DIHYDRATE 2 PUFF: 80; 4.5 AEROSOL RESPIRATORY (INHALATION) at 20:20

## 2022-05-11 ENCOUNTER — READMISSION MANAGEMENT (OUTPATIENT)
Dept: CALL CENTER | Facility: HOSPITAL | Age: 67
End: 2022-05-11

## 2022-05-11 VITALS
TEMPERATURE: 98.8 F | HEART RATE: 100 BPM | RESPIRATION RATE: 18 BRPM | OXYGEN SATURATION: 100 % | SYSTOLIC BLOOD PRESSURE: 157 MMHG | DIASTOLIC BLOOD PRESSURE: 84 MMHG

## 2022-05-11 RX ORDER — OXYCODONE AND ACETAMINOPHEN 10; 325 MG/1; MG/1
1 TABLET ORAL EVERY 4 HOURS PRN
Qty: 42 TABLET | Refills: 0 | Status: SHIPPED | OUTPATIENT
Start: 2022-05-11 | End: 2022-05-18

## 2022-05-11 RX ADMIN — TRIHEXYPHENIDYL HYDROCHLORIDE 1 MG: 2 TABLET ORAL at 08:04

## 2022-05-11 RX ADMIN — FAMOTIDINE 20 MG: 20 TABLET, FILM COATED ORAL at 08:04

## 2022-05-11 RX ADMIN — LEVOTHYROXINE SODIUM 75 MCG: 75 TABLET ORAL at 06:00

## 2022-05-11 RX ADMIN — ARIPIPRAZOLE 20 MG: 10 TABLET ORAL at 08:03

## 2022-05-11 RX ADMIN — METFORMIN HYDROCHLORIDE 500 MG: 500 TABLET ORAL at 08:04

## 2022-05-11 RX ADMIN — OXYBUTYNIN CHLORIDE 5 MG: 5 TABLET ORAL at 08:04

## 2022-05-11 RX ADMIN — Medication 3 ML: at 08:04

## 2022-05-11 RX ADMIN — OXCARBAZEPINE 300 MG: 300 TABLET, FILM COATED ORAL at 08:03

## 2022-05-11 RX ADMIN — ESCITALOPRAM 5 MG: 10 TABLET, FILM COATED ORAL at 08:03

## 2022-05-11 RX ADMIN — GABAPENTIN 800 MG: 400 CAPSULE ORAL at 06:00

## 2022-05-11 RX ADMIN — OXYCODONE AND ACETAMINOPHEN 1 TABLET: 325; 10 TABLET ORAL at 06:09

## 2022-05-16 ENCOUNTER — READMISSION MANAGEMENT (OUTPATIENT)
Dept: CALL CENTER | Facility: HOSPITAL | Age: 67
End: 2022-05-16

## 2022-06-01 DIAGNOSIS — M48.061 SPINAL STENOSIS OF LUMBAR REGION WITHOUT NEUROGENIC CLAUDICATION: ICD-10-CM

## 2022-06-01 RX ORDER — OXYCODONE AND ACETAMINOPHEN 10; 325 MG/1; MG/1
1 TABLET ORAL EVERY 8 HOURS PRN
Qty: 60 TABLET | Refills: 0 | Status: SHIPPED | OUTPATIENT
Start: 2022-06-01 | End: 2022-06-21

## 2022-07-20 ENCOUNTER — OFFICE VISIT (OUTPATIENT)
Dept: PULMONOLOGY | Facility: CLINIC | Age: 67
End: 2022-07-20

## 2022-07-20 VITALS
BODY MASS INDEX: 33.49 KG/M2 | WEIGHT: 189 LBS | HEIGHT: 63 IN | DIASTOLIC BLOOD PRESSURE: 90 MMHG | OXYGEN SATURATION: 99 % | SYSTOLIC BLOOD PRESSURE: 130 MMHG | HEART RATE: 60 BPM

## 2022-07-20 DIAGNOSIS — J45.40 MODERATE PERSISTENT ASTHMA WITHOUT COMPLICATION: Primary | Chronic | ICD-10-CM

## 2022-07-20 DIAGNOSIS — E66.01 MORBIDLY OBESE: Chronic | ICD-10-CM

## 2022-07-20 DIAGNOSIS — K21.9 GASTROESOPHAGEAL REFLUX DISEASE WITHOUT ESOPHAGITIS: Chronic | ICD-10-CM

## 2022-07-20 PROCEDURE — 99214 OFFICE O/P EST MOD 30 MIN: CPT | Performed by: NURSE PRACTITIONER

## 2022-07-20 RX ORDER — MONTELUKAST SODIUM 10 MG/1
10 TABLET ORAL NIGHTLY
COMMUNITY
Start: 2022-06-23

## 2022-07-20 RX ORDER — DICLOFENAC SODIUM 75 MG/1
75 TABLET, DELAYED RELEASE ORAL 2 TIMES DAILY
COMMUNITY
Start: 2022-06-23

## 2022-07-20 RX ORDER — TIZANIDINE 4 MG/1
TABLET ORAL
COMMUNITY
Start: 2022-06-23

## 2022-07-20 RX ORDER — MIRTAZAPINE 30 MG/1
30 TABLET, FILM COATED ORAL
COMMUNITY
Start: 2022-07-08

## 2022-09-20 ENCOUNTER — OFFICE VISIT (OUTPATIENT)
Dept: PODIATRY | Facility: CLINIC | Age: 67
End: 2022-09-20

## 2022-09-20 VITALS
OXYGEN SATURATION: 97 % | HEART RATE: 62 BPM | SYSTOLIC BLOOD PRESSURE: 134 MMHG | HEIGHT: 63 IN | DIASTOLIC BLOOD PRESSURE: 86 MMHG | BODY MASS INDEX: 33.48 KG/M2

## 2022-09-20 DIAGNOSIS — M96.89 NONUNION OF BONE AFTER OSTEOTOMY: Primary | ICD-10-CM

## 2022-09-20 DIAGNOSIS — E11.65 TYPE 2 DIABETES MELLITUS WITH HYPERGLYCEMIA, WITHOUT LONG-TERM CURRENT USE OF INSULIN: ICD-10-CM

## 2022-09-20 PROCEDURE — 99214 OFFICE O/P EST MOD 30 MIN: CPT | Performed by: PODIATRIST

## 2022-09-23 ENCOUNTER — TELEPHONE (OUTPATIENT)
Dept: PODIATRY | Facility: CLINIC | Age: 67
End: 2022-09-23

## 2022-10-19 ENCOUNTER — APPOINTMENT (OUTPATIENT)
Dept: PREADMISSION TESTING | Facility: HOSPITAL | Age: 67
End: 2022-10-19

## 2022-10-20 RX ORDER — OMEPRAZOLE 40 MG/1
CAPSULE, DELAYED RELEASE ORAL
Qty: 30 CAPSULE | Refills: 11 | OUTPATIENT
Start: 2022-10-20

## 2022-10-20 NOTE — TELEPHONE ENCOUNTER
[facility]. No FU scheduled. Last RF 9-2021. I notified the Pharmacy this patient will need an OV for future medication refills.   cma

## 2022-10-25 RX ORDER — OMEPRAZOLE 40 MG/1
CAPSULE, DELAYED RELEASE ORAL
Qty: 30 CAPSULE | Refills: 11 | OUTPATIENT
Start: 2022-10-25

## 2022-10-25 NOTE — TELEPHONE ENCOUNTER
Last seen 4-2021. No FU scheduled. Notified the Pharmacy this patient will need an OV for future refills.  This is the second message sent to the Pharmacy regarding this request. Manny bernal

## 2022-11-01 ENCOUNTER — TELEPHONE (OUTPATIENT)
Dept: PODIATRY | Facility: CLINIC | Age: 67
End: 2022-11-01

## 2022-11-01 ENCOUNTER — TELEPHONE (OUTPATIENT)
Dept: VASCULAR SURGERY | Facility: CLINIC | Age: 67
End: 2022-11-01

## 2022-11-09 ENCOUNTER — HOSPITAL ENCOUNTER (OUTPATIENT)
Dept: GENERAL RADIOLOGY | Facility: HOSPITAL | Age: 67
Discharge: HOME | End: 2022-11-09

## 2022-11-09 ENCOUNTER — PRE-ADMISSION TESTING (OUTPATIENT)
Dept: PREADMISSION TESTING | Facility: HOSPITAL | Age: 67
End: 2022-11-09

## 2022-11-09 VITALS
DIASTOLIC BLOOD PRESSURE: 98 MMHG | WEIGHT: 190.48 LBS | HEART RATE: 80 BPM | HEIGHT: 63 IN | SYSTOLIC BLOOD PRESSURE: 153 MMHG | BODY MASS INDEX: 33.75 KG/M2 | RESPIRATION RATE: 18 BRPM | OXYGEN SATURATION: 98 %

## 2022-11-09 DIAGNOSIS — M96.89 NONUNION OF BONE AFTER OSTEOTOMY: ICD-10-CM

## 2022-11-09 LAB
ANION GAP SERPL CALCULATED.3IONS-SCNC: 9 MMOL/L (ref 5–15)
BASOPHILS # BLD AUTO: 0.02 10*3/MM3 (ref 0–0.2)
BASOPHILS NFR BLD AUTO: 0.4 % (ref 0–1.5)
BUN SERPL-MCNC: 18 MG/DL (ref 8–23)
BUN/CREAT SERPL: 21.7 (ref 7–25)
CALCIUM SPEC-SCNC: 9.4 MG/DL (ref 8.6–10.5)
CHLORIDE SERPL-SCNC: 102 MMOL/L (ref 98–107)
CO2 SERPL-SCNC: 27 MMOL/L (ref 22–29)
CREAT SERPL-MCNC: 0.83 MG/DL (ref 0.57–1)
DEPRECATED RDW RBC AUTO: 47.1 FL (ref 37–54)
EGFRCR SERPLBLD CKD-EPI 2021: 76.9 ML/MIN/1.73
EOSINOPHIL # BLD AUTO: 0.24 10*3/MM3 (ref 0–0.4)
EOSINOPHIL NFR BLD AUTO: 4.9 % (ref 0.3–6.2)
ERYTHROCYTE [DISTWIDTH] IN BLOOD BY AUTOMATED COUNT: 15.6 % (ref 12.3–15.4)
GLUCOSE SERPL-MCNC: 107 MG/DL (ref 65–99)
HCT VFR BLD AUTO: 31.2 % (ref 34–46.6)
HGB BLD-MCNC: 9.6 G/DL (ref 12–15.9)
IMM GRANULOCYTES # BLD AUTO: 0.02 10*3/MM3 (ref 0–0.05)
IMM GRANULOCYTES NFR BLD AUTO: 0.4 % (ref 0–0.5)
LYMPHOCYTES # BLD AUTO: 1.2 10*3/MM3 (ref 0.7–3.1)
LYMPHOCYTES NFR BLD AUTO: 24.3 % (ref 19.6–45.3)
MCH RBC QN AUTO: 25.9 PG (ref 26.6–33)
MCHC RBC AUTO-ENTMCNC: 30.8 G/DL (ref 31.5–35.7)
MCV RBC AUTO: 84.1 FL (ref 79–97)
MONOCYTES # BLD AUTO: 0.26 10*3/MM3 (ref 0.1–0.9)
MONOCYTES NFR BLD AUTO: 5.3 % (ref 5–12)
NEUTROPHILS NFR BLD AUTO: 3.19 10*3/MM3 (ref 1.7–7)
NEUTROPHILS NFR BLD AUTO: 64.7 % (ref 42.7–76)
NRBC BLD AUTO-RTO: 0 /100 WBC (ref 0–0.2)
PLATELET # BLD AUTO: 320 10*3/MM3 (ref 140–450)
PMV BLD AUTO: 8.2 FL (ref 6–12)
POTASSIUM SERPL-SCNC: 4 MMOL/L (ref 3.5–5.2)
RBC # BLD AUTO: 3.71 10*6/MM3 (ref 3.77–5.28)
SODIUM SERPL-SCNC: 138 MMOL/L (ref 136–145)
WBC NRBC COR # BLD: 4.93 10*3/MM3 (ref 3.4–10.8)

## 2022-11-09 PROCEDURE — 71046 X-RAY EXAM CHEST 2 VIEWS: CPT

## 2022-11-09 PROCEDURE — 93010 ELECTROCARDIOGRAM REPORT: CPT | Performed by: INTERNAL MEDICINE

## 2022-11-09 PROCEDURE — 36415 COLL VENOUS BLD VENIPUNCTURE: CPT

## 2022-11-09 PROCEDURE — 80048 BASIC METABOLIC PNL TOTAL CA: CPT

## 2022-11-09 PROCEDURE — 93005 ELECTROCARDIOGRAM TRACING: CPT

## 2022-11-09 PROCEDURE — 85025 COMPLETE CBC W/AUTO DIFF WBC: CPT

## 2022-11-10 LAB
QT INTERVAL: 408 MS
QTC INTERVAL: 452 MS

## 2022-11-15 ENCOUNTER — TELEPHONE (OUTPATIENT)
Dept: PODIATRY | Facility: CLINIC | Age: 67
End: 2022-11-15

## 2022-11-15 ENCOUNTER — HOSPITAL ENCOUNTER (OUTPATIENT)
Dept: GENERAL RADIOLOGY | Facility: HOSPITAL | Age: 67
Discharge: HOME | End: 2022-11-15
Admitting: PODIATRIST

## 2022-11-15 DIAGNOSIS — Z98.890 S/P FOOT SURGERY: ICD-10-CM

## 2022-11-15 PROCEDURE — 73630 X-RAY EXAM OF FOOT: CPT

## 2022-11-15 PROCEDURE — 73660 X-RAY EXAM OF TOE(S): CPT

## 2022-11-16 ENCOUNTER — HOSPITAL ENCOUNTER (OUTPATIENT)
Facility: HOSPITAL | Age: 67
Setting detail: HOSPITAL OUTPATIENT SURGERY
Discharge: HOME | End: 2022-11-16
Attending: PODIATRIST | Admitting: PODIATRIST
Payer: MEDICARE

## 2022-11-16 ENCOUNTER — ANESTHESIA (OUTPATIENT)
Dept: PERIOP | Facility: HOSPITAL | Age: 67
End: 2022-11-16

## 2022-11-16 ENCOUNTER — APPOINTMENT (OUTPATIENT)
Dept: GENERAL RADIOLOGY | Facility: HOSPITAL | Age: 67
End: 2022-11-16
Payer: MEDICARE

## 2022-11-16 ENCOUNTER — ANESTHESIA EVENT (OUTPATIENT)
Dept: PERIOP | Facility: HOSPITAL | Age: 67
End: 2022-11-16

## 2022-11-16 VITALS
RESPIRATION RATE: 20 BRPM | TEMPERATURE: 97.5 F | OXYGEN SATURATION: 98 % | HEART RATE: 58 BPM | DIASTOLIC BLOOD PRESSURE: 83 MMHG | SYSTOLIC BLOOD PRESSURE: 180 MMHG

## 2022-11-16 DIAGNOSIS — M96.89 NONUNION OF BONE AFTER OSTEOTOMY: ICD-10-CM

## 2022-11-16 LAB
GLUCOSE BLDC GLUCOMTR-MCNC: 88 MG/DL (ref 70–130)
GLUCOSE BLDC GLUCOMTR-MCNC: 94 MG/DL (ref 70–130)

## 2022-11-16 PROCEDURE — 25010000002 ONDANSETRON PER 1 MG: Performed by: NURSE ANESTHETIST, CERTIFIED REGISTERED

## 2022-11-16 PROCEDURE — S0260 H&P FOR SURGERY: HCPCS | Performed by: PODIATRIST

## 2022-11-16 PROCEDURE — C1713 ANCHOR/SCREW BN/BN,TIS/BN: HCPCS | Performed by: PODIATRIST

## 2022-11-16 PROCEDURE — 25010000002 CEFAZOLIN PER 500 MG: Performed by: PODIATRIST

## 2022-11-16 PROCEDURE — 25010000002 PROPOFOL 10 MG/ML EMULSION: Performed by: NURSE ANESTHETIST, CERTIFIED REGISTERED

## 2022-11-16 PROCEDURE — 25010000002 DEXAMETHASONE PER 1 MG: Performed by: NURSE ANESTHETIST, CERTIFIED REGISTERED

## 2022-11-16 PROCEDURE — 20680 REMOVAL OF IMPLANT DEEP: CPT | Performed by: PODIATRIST

## 2022-11-16 PROCEDURE — 82962 GLUCOSE BLOOD TEST: CPT

## 2022-11-16 PROCEDURE — 25010000002 FENTANYL CITRATE (PF) 100 MCG/2ML SOLUTION: Performed by: NURSE ANESTHETIST, CERTIFIED REGISTERED

## 2022-11-16 PROCEDURE — 73620 X-RAY EXAM OF FOOT: CPT

## 2022-11-16 PROCEDURE — 20900 REMOVAL OF BONE FOR GRAFT: CPT | Performed by: PODIATRIST

## 2022-11-16 PROCEDURE — 28755 FUSION OF BIG TOE JOINT: CPT | Performed by: PODIATRIST

## 2022-11-16 DEVICE — K-WIRE, SINGLE ENDED TROCAR TIP, SMOOTH, 0.9 X 150MM
Type: IMPLANTABLE DEVICE | Site: FOOT | Status: FUNCTIONAL
Brand: MONSTER SCREW SYSTEM

## 2022-11-16 DEVICE — MINI MONSTER HEADED, SHORT THREAD, 3.0 X 26MM
Type: IMPLANTABLE DEVICE | Site: FOOT | Status: FUNCTIONAL
Brand: MONSTER SCREW SYSTEM

## 2022-11-16 DEVICE — STRAIGHT STAPLE ASSEMBLY, 10 X 10MM
Type: IMPLANTABLE DEVICE | Site: FOOT | Status: FUNCTIONAL
Brand: JAWS NITINOL STAPLE SYSTEM

## 2022-11-16 RX ORDER — SODIUM CHLORIDE 0.9 % (FLUSH) 0.9 %
3 SYRINGE (ML) INJECTION AS NEEDED
Status: DISCONTINUED | OUTPATIENT
Start: 2022-11-16 | End: 2022-11-16 | Stop reason: HOSPADM

## 2022-11-16 RX ORDER — DROPERIDOL 2.5 MG/ML
0.62 INJECTION, SOLUTION INTRAMUSCULAR; INTRAVENOUS ONCE AS NEEDED
Status: DISCONTINUED | OUTPATIENT
Start: 2022-11-16 | End: 2022-11-16 | Stop reason: HOSPADM

## 2022-11-16 RX ORDER — BUPIVACAINE HCL/0.9 % NACL/PF 0.1 %
2 PLASTIC BAG, INJECTION (ML) EPIDURAL ONCE
Status: COMPLETED | OUTPATIENT
Start: 2022-11-16 | End: 2022-11-16

## 2022-11-16 RX ORDER — LIDOCAINE HYDROCHLORIDE 10 MG/ML
0.5 INJECTION, SOLUTION EPIDURAL; INFILTRATION; INTRACAUDAL; PERINEURAL ONCE AS NEEDED
Status: DISCONTINUED | OUTPATIENT
Start: 2022-11-16 | End: 2022-11-16 | Stop reason: HOSPADM

## 2022-11-16 RX ORDER — OXYCODONE AND ACETAMINOPHEN 7.5; 325 MG/1; MG/1
1 TABLET ORAL EVERY 6 HOURS PRN
Qty: 28 TABLET | Refills: 0 | Status: SHIPPED | OUTPATIENT
Start: 2022-11-16 | End: 2022-12-28

## 2022-11-16 RX ORDER — SODIUM CHLORIDE, SODIUM LACTATE, POTASSIUM CHLORIDE, CALCIUM CHLORIDE 600; 310; 30; 20 MG/100ML; MG/100ML; MG/100ML; MG/100ML
1000 INJECTION, SOLUTION INTRAVENOUS CONTINUOUS
Status: DISCONTINUED | OUTPATIENT
Start: 2022-11-16 | End: 2022-11-16 | Stop reason: HOSPADM

## 2022-11-16 RX ORDER — MIDAZOLAM HYDROCHLORIDE 1 MG/ML
0.5 INJECTION INTRAMUSCULAR; INTRAVENOUS
Status: DISCONTINUED | OUTPATIENT
Start: 2022-11-16 | End: 2022-11-16 | Stop reason: HOSPADM

## 2022-11-16 RX ORDER — DOCUSATE SODIUM 100 MG/1
100 CAPSULE, LIQUID FILLED ORAL DAILY
Qty: 7 CAPSULE | Refills: 0 | Status: SHIPPED | OUTPATIENT
Start: 2022-11-16

## 2022-11-16 RX ORDER — SODIUM CHLORIDE 0.9 % (FLUSH) 0.9 %
3-10 SYRINGE (ML) INJECTION AS NEEDED
Status: DISCONTINUED | OUTPATIENT
Start: 2022-11-16 | End: 2022-11-16 | Stop reason: HOSPADM

## 2022-11-16 RX ORDER — ACETAMINOPHEN 500 MG
1000 TABLET ORAL ONCE
Status: COMPLETED | OUTPATIENT
Start: 2022-11-16 | End: 2022-11-16

## 2022-11-16 RX ORDER — MAGNESIUM HYDROXIDE 1200 MG/15ML
LIQUID ORAL AS NEEDED
Status: DISCONTINUED | OUTPATIENT
Start: 2022-11-16 | End: 2022-11-16 | Stop reason: HOSPADM

## 2022-11-16 RX ORDER — DEXAMETHASONE SODIUM PHOSPHATE 4 MG/ML
INJECTION, SOLUTION INTRA-ARTICULAR; INTRALESIONAL; INTRAMUSCULAR; INTRAVENOUS; SOFT TISSUE AS NEEDED
Status: DISCONTINUED | OUTPATIENT
Start: 2022-11-16 | End: 2022-11-16 | Stop reason: SURG

## 2022-11-16 RX ORDER — FENTANYL CITRATE 50 UG/ML
INJECTION, SOLUTION INTRAMUSCULAR; INTRAVENOUS AS NEEDED
Status: DISCONTINUED | OUTPATIENT
Start: 2022-11-16 | End: 2022-11-16 | Stop reason: SURG

## 2022-11-16 RX ORDER — OXYCODONE AND ACETAMINOPHEN 7.5; 325 MG/1; MG/1
2 TABLET ORAL EVERY 4 HOURS PRN
Status: DISCONTINUED | OUTPATIENT
Start: 2022-11-16 | End: 2022-11-16 | Stop reason: HOSPADM

## 2022-11-16 RX ORDER — LABETALOL HYDROCHLORIDE 5 MG/ML
5 INJECTION, SOLUTION INTRAVENOUS
Status: DISCONTINUED | OUTPATIENT
Start: 2022-11-16 | End: 2022-11-16 | Stop reason: HOSPADM

## 2022-11-16 RX ORDER — PROPOFOL 10 MG/ML
VIAL (ML) INTRAVENOUS AS NEEDED
Status: DISCONTINUED | OUTPATIENT
Start: 2022-11-16 | End: 2022-11-16 | Stop reason: SURG

## 2022-11-16 RX ORDER — SODIUM CHLORIDE 0.9 % (FLUSH) 0.9 %
3 SYRINGE (ML) INJECTION EVERY 12 HOURS SCHEDULED
Status: DISCONTINUED | OUTPATIENT
Start: 2022-11-16 | End: 2022-11-16 | Stop reason: HOSPADM

## 2022-11-16 RX ORDER — OXYCODONE AND ACETAMINOPHEN 10; 325 MG/1; MG/1
1 TABLET ORAL ONCE AS NEEDED
Status: DISCONTINUED | OUTPATIENT
Start: 2022-11-16 | End: 2022-11-16 | Stop reason: HOSPADM

## 2022-11-16 RX ORDER — BUPIVACAINE HYDROCHLORIDE 5 MG/ML
INJECTION, SOLUTION PERINEURAL AS NEEDED
Status: DISCONTINUED | OUTPATIENT
Start: 2022-11-16 | End: 2022-11-16 | Stop reason: HOSPADM

## 2022-11-16 RX ORDER — LIDOCAINE HYDROCHLORIDE 20 MG/ML
INJECTION, SOLUTION EPIDURAL; INFILTRATION; INTRACAUDAL; PERINEURAL AS NEEDED
Status: DISCONTINUED | OUTPATIENT
Start: 2022-11-16 | End: 2022-11-16 | Stop reason: SURG

## 2022-11-16 RX ORDER — IBUPROFEN 600 MG/1
600 TABLET ORAL ONCE AS NEEDED
Status: DISCONTINUED | OUTPATIENT
Start: 2022-11-16 | End: 2022-11-16 | Stop reason: HOSPADM

## 2022-11-16 RX ORDER — PROMETHAZINE HYDROCHLORIDE 12.5 MG/1
12.5 TABLET ORAL EVERY 8 HOURS PRN
Qty: 21 TABLET | Refills: 0 | Status: SHIPPED | OUTPATIENT
Start: 2022-11-16

## 2022-11-16 RX ORDER — SODIUM CHLORIDE, SODIUM LACTATE, POTASSIUM CHLORIDE, CALCIUM CHLORIDE 600; 310; 30; 20 MG/100ML; MG/100ML; MG/100ML; MG/100ML
100 INJECTION, SOLUTION INTRAVENOUS CONTINUOUS
Status: DISCONTINUED | OUTPATIENT
Start: 2022-11-16 | End: 2022-11-16 | Stop reason: HOSPADM

## 2022-11-16 RX ORDER — ONDANSETRON 2 MG/ML
4 INJECTION INTRAMUSCULAR; INTRAVENOUS ONCE AS NEEDED
Status: DISCONTINUED | OUTPATIENT
Start: 2022-11-16 | End: 2022-11-16 | Stop reason: HOSPADM

## 2022-11-16 RX ORDER — NALOXONE HCL 0.4 MG/ML
0.4 VIAL (ML) INJECTION AS NEEDED
Status: DISCONTINUED | OUTPATIENT
Start: 2022-11-16 | End: 2022-11-16 | Stop reason: HOSPADM

## 2022-11-16 RX ORDER — ONDANSETRON 2 MG/ML
INJECTION INTRAMUSCULAR; INTRAVENOUS AS NEEDED
Status: DISCONTINUED | OUTPATIENT
Start: 2022-11-16 | End: 2022-11-16 | Stop reason: SURG

## 2022-11-16 RX ORDER — FLUMAZENIL 0.1 MG/ML
0.2 INJECTION INTRAVENOUS AS NEEDED
Status: DISCONTINUED | OUTPATIENT
Start: 2022-11-16 | End: 2022-11-16 | Stop reason: HOSPADM

## 2022-11-16 RX ORDER — FENTANYL CITRATE 50 UG/ML
25 INJECTION, SOLUTION INTRAMUSCULAR; INTRAVENOUS
Status: DISCONTINUED | OUTPATIENT
Start: 2022-11-16 | End: 2022-11-16 | Stop reason: HOSPADM

## 2022-11-16 RX ADMIN — LIDOCAINE HYDROCHLORIDE 100 MG: 20 INJECTION, SOLUTION EPIDURAL; INFILTRATION; INTRACAUDAL; PERINEURAL at 09:46

## 2022-11-16 RX ADMIN — ONDANSETRON 4 MG: 2 INJECTION INTRAMUSCULAR; INTRAVENOUS at 11:08

## 2022-11-16 RX ADMIN — Medication 2 G: at 09:50

## 2022-11-16 RX ADMIN — DEXAMETHASONE SODIUM PHOSPHATE 4 MG: 4 INJECTION, SOLUTION INTRA-ARTICULAR; INTRALESIONAL; INTRAMUSCULAR; INTRAVENOUS; SOFT TISSUE at 11:05

## 2022-11-16 RX ADMIN — PROPOFOL 150 MG: 10 INJECTION, EMULSION INTRAVENOUS at 09:46

## 2022-11-16 RX ADMIN — FENTANYL CITRATE 100 MCG: 50 INJECTION INTRAMUSCULAR; INTRAVENOUS at 10:02

## 2022-11-16 RX ADMIN — ACETAMINOPHEN 1000 MG: 500 TABLET ORAL at 09:10

## 2022-11-16 RX ADMIN — SODIUM CHLORIDE, POTASSIUM CHLORIDE, SODIUM LACTATE AND CALCIUM CHLORIDE 1000 ML: 600; 310; 30; 20 INJECTION, SOLUTION INTRAVENOUS at 08:48

## 2022-11-17 ENCOUNTER — TELEPHONE (OUTPATIENT)
Dept: GASTROENTEROLOGY | Age: 67
End: 2022-11-17

## 2022-11-17 ENCOUNTER — TELEPHONE (OUTPATIENT)
Dept: PODIATRY | Facility: CLINIC | Age: 67
End: 2022-11-17

## 2022-11-17 RX ORDER — OMEPRAZOLE 40 MG/1
40 CAPSULE, DELAYED RELEASE ORAL DAILY
Qty: 30 CAPSULE | Refills: 2 | Status: SHIPPED | OUTPATIENT
Start: 2022-11-17

## 2022-11-17 NOTE — TELEPHONE ENCOUNTER
Jeanie is requesting a refill of their   omeprazole (PRILOSEC) 40 MG delayed release capsule  . Please advise.   Krishna called to schedule a follow up visit and requested a refill until then.   -Thanks     Last Appt:  4/1/2021  Next Appt:   1/17/2023  Preferred pharmacy: [facility] [phone]

## 2022-11-21 ENCOUNTER — TELEPHONE (OUTPATIENT)
Dept: PODIATRY | Facility: CLINIC | Age: 67
End: 2022-11-21

## 2022-11-22 ENCOUNTER — OFFICE VISIT (OUTPATIENT)
Dept: PODIATRY | Facility: CLINIC | Age: 67
End: 2022-11-22

## 2022-11-22 VITALS
WEIGHT: 190 LBS | HEART RATE: 68 BPM | OXYGEN SATURATION: 98 % | SYSTOLIC BLOOD PRESSURE: 122 MMHG | BODY MASS INDEX: 33.66 KG/M2 | HEIGHT: 63 IN | DIASTOLIC BLOOD PRESSURE: 64 MMHG

## 2022-11-22 DIAGNOSIS — Z98.890 S/P FOOT SURGERY: Primary | ICD-10-CM

## 2022-11-22 PROCEDURE — 99024 POSTOP FOLLOW-UP VISIT: CPT | Performed by: PODIATRIST

## 2022-12-05 ENCOUNTER — TELEPHONE (OUTPATIENT)
Dept: PODIATRY | Facility: CLINIC | Age: 67
End: 2022-12-05

## 2022-12-06 ENCOUNTER — OFFICE VISIT (OUTPATIENT)
Dept: PODIATRY | Facility: CLINIC | Age: 67
End: 2022-12-06

## 2022-12-06 VITALS — OXYGEN SATURATION: 98 % | HEIGHT: 63 IN | BODY MASS INDEX: 33.66 KG/M2 | HEART RATE: 55 BPM | WEIGHT: 190 LBS

## 2022-12-06 DIAGNOSIS — Z98.890 S/P FOOT SURGERY: Primary | ICD-10-CM

## 2022-12-06 PROCEDURE — 99024 POSTOP FOLLOW-UP VISIT: CPT | Performed by: PODIATRIST

## 2022-12-28 ENCOUNTER — OFFICE VISIT (OUTPATIENT)
Dept: PODIATRY | Facility: CLINIC | Age: 67
End: 2022-12-28

## 2022-12-28 ENCOUNTER — HOSPITAL ENCOUNTER (OUTPATIENT)
Dept: GENERAL RADIOLOGY | Facility: HOSPITAL | Age: 67
Discharge: HOME | End: 2022-12-28
Admitting: PODIATRIST

## 2022-12-28 VITALS
DIASTOLIC BLOOD PRESSURE: 74 MMHG | SYSTOLIC BLOOD PRESSURE: 122 MMHG | HEIGHT: 63 IN | BODY MASS INDEX: 32.78 KG/M2 | WEIGHT: 185 LBS

## 2022-12-28 DIAGNOSIS — Z98.890 S/P FOOT SURGERY: ICD-10-CM

## 2022-12-28 DIAGNOSIS — Z98.890 S/P FOOT SURGERY: Primary | ICD-10-CM

## 2022-12-28 PROCEDURE — 99024 POSTOP FOLLOW-UP VISIT: CPT | Performed by: NURSE PRACTITIONER

## 2022-12-28 PROCEDURE — 73630 X-RAY EXAM OF FOOT: CPT

## 2023-01-17 ENCOUNTER — OFFICE VISIT (OUTPATIENT)
Dept: GASTROENTEROLOGY | Age: 68
End: 2023-01-17
Payer: MEDICARE

## 2023-01-17 VITALS
HEIGHT: 63 IN | WEIGHT: 187 LBS | OXYGEN SATURATION: 98 % | SYSTOLIC BLOOD PRESSURE: 124 MMHG | HEART RATE: 97 BPM | DIASTOLIC BLOOD PRESSURE: 82 MMHG | BODY MASS INDEX: 33.13 KG/M2

## 2023-01-17 DIAGNOSIS — R13.19 ESOPHAGEAL DYSPHAGIA: ICD-10-CM

## 2023-01-17 DIAGNOSIS — Z86.010 HX OF COLONIC POLYPS: ICD-10-CM

## 2023-01-17 DIAGNOSIS — K21.9 CHRONIC GERD: Primary | ICD-10-CM

## 2023-01-17 PROCEDURE — 3074F SYST BP LT 130 MM HG: CPT | Performed by: NURSE PRACTITIONER

## 2023-01-17 PROCEDURE — 1036F TOBACCO NON-USER: CPT | Performed by: NURSE PRACTITIONER

## 2023-01-17 PROCEDURE — 3079F DIAST BP 80-89 MM HG: CPT | Performed by: NURSE PRACTITIONER

## 2023-01-17 PROCEDURE — 1090F PRES/ABSN URINE INCON ASSESS: CPT | Performed by: NURSE PRACTITIONER

## 2023-01-17 PROCEDURE — G8400 PT W/DXA NO RESULTS DOC: HCPCS | Performed by: NURSE PRACTITIONER

## 2023-01-17 PROCEDURE — 99214 OFFICE O/P EST MOD 30 MIN: CPT | Performed by: NURSE PRACTITIONER

## 2023-01-17 PROCEDURE — 3017F COLORECTAL CA SCREEN DOC REV: CPT | Performed by: NURSE PRACTITIONER

## 2023-01-17 PROCEDURE — G8417 CALC BMI ABV UP PARAM F/U: HCPCS | Performed by: NURSE PRACTITIONER

## 2023-01-17 PROCEDURE — 1123F ACP DISCUSS/DSCN MKR DOCD: CPT | Performed by: NURSE PRACTITIONER

## 2023-01-17 PROCEDURE — G8427 DOCREV CUR MEDS BY ELIG CLIN: HCPCS | Performed by: NURSE PRACTITIONER

## 2023-01-17 PROCEDURE — G8484 FLU IMMUNIZE NO ADMIN: HCPCS | Performed by: NURSE PRACTITIONER

## 2023-01-17 RX ORDER — OMEPRAZOLE 40 MG/1
40 CAPSULE, DELAYED RELEASE ORAL DAILY
Qty: 90 CAPSULE | Refills: 3 | Status: SHIPPED | OUTPATIENT
Start: 2023-01-17

## 2023-01-17 ASSESSMENT — ENCOUNTER SYMPTOMS
DIARRHEA: 0
ABDOMINAL PAIN: 0
CHOKING: 0
BLOOD IN STOOL: 0
NAUSEA: 0
CONSTIPATION: 0
VOMITING: 0
RECTAL PAIN: 0
ABDOMINAL DISTENTION: 0
TROUBLE SWALLOWING: 1
ANAL BLEEDING: 0
SHORTNESS OF BREATH: 0
COUGH: 0

## 2023-01-17 NOTE — PATIENT INSTRUCTIONS
Schedule colonoscopy and endoscopy. Do not eat or drink after midnight the day of the procedure. Allowed medications can be taken with a small sip of water. Please review your prep instructions for allowed medications. You will not be able to drive for 24 hours after the procedure due to sedation. Must have a responsible adult, 18 years or older, accompany you to drive you home the day of your procedure. If you are on blood thinners, clearance from the prescribing physician will be obtained before your procedure is scheduled. If it is determined it is not safe to hold these medications for a short time an alternative procedure for evaluation may be recommended. No aspirin, ibuprofen, naproxen, fish oil or vitamin E for 5 days before procedure. Risks of colonoscopy and endoscopy include, but are not limited to, perforation, bleeding, and infection, Risk of perforation and bleeding are increased if there is a polyp removed or dilation completed. Anesthesia risks will be reviewed with you before the procedure by a member of the anesthesia department. Your physician may also schedule a follow up appointment with the nurse practitioner to discuss pathology, symptoms or to check if you have had any problems related to your procedure. If you prefer not to return to the office after your procedure please discuss this with your physician on the day of your colonoscopy. The physician will talk with you and/or your family after the procedure is completed. Final recommendations are based on the pathologist report if biopsies or specimens are taken. If polyps are removed during the procedure they will be sent to a pathologist for analysis. Unless you have a follow up appointment scheduled, you will be notified by mail of the pathology results within 4 weeks. If you have not received results after 4 weeks you may call the office to obtain this information. For Colonoscopy:   You will be given specific directions regarding restrictions to diet and bowel prep instructions including laxatives. Please read these instructions one week prior to your scheduled procedure to ensure that you are prepared. If you have any questions regarding these instructions please call our office Mon through Fri from 8:00 am to 4:00 pm.     Follow prep instructions provided for bowel prep. Take all of the bowel prep as directed. If you are having problems with nausea, stop your prep for 30-45 min to allow the nausea to subside before resuming your prep. It is important to drink plenty of fluids throughout the day before taking your laxatives. This will help to protect your kidneys, prevent dehydration and maximize the effect of the bowel prep. Your diet before a colonoscopy bowel preparation is very important to ensure a successful colon exam. It is recommended to consider certain changes to your diet three to four days prior to the procedure. Remember that your bowels need to be completely empty for the exam.    What foods are good to eat? Cut down on heavy solid foods three to four days before the procedure and start introducing lighter meals to your diet. The following food suggestions are a good part of your diet before a colonoscopy bowel preparation. Light meat that is easily digestible such as chicken (without the skin)   Potatoes without skin   Cheese   Eggs   A light meal of steamed white fish   Light clear soups    Foods and drinks to avoid  Avoid foods that contain too much fiber. Stay clear of dark colored beverages. They can stick to the walls of the digestive tract and make it difficult to differentiate from blood.  Some of these foods are:  Red meat, rice, nuts and vegetables   Milk, other milk based fluids and cream   Most fruit and puddings   Whole grain pasta   Cereals, bran and seeds   Colored beverages, especially those that are red or purple in color   Red colored Jell-O   On the day before the colonoscopy, continue to drink plenty of clear fluids. It is important   to keep yourself hydrated before the exam.     Please follow all instructions as provided for cleansing the bowel. Failure to have an adequately prepped colon may cause you to have incomplete exam with further testing required.      [email or web address]

## 2023-01-17 NOTE — PROGRESS NOTES
Subjective:     Patient ID: Palmer Fishman is a 69 y.o. female  PCP: Humberto Dhillon DO  Referring Provider: No ref. provider found    HPI  Patient presents to the office today with the following complaints: Medication Refill (Pt here for medication refill and follow up for reflux.)      Pt seen today for follow up. Last OV 4/2021. Hx chronic GERD. Currently treated with Omeprazole 40 mg daily. Today, pt states she is having trouble getting her food to go down. Symptoms present for the last year and worsening. She also c/o breakthrough reflux. Pt states bread and meats are worse when trying to swallow. She admits to regurgitation of food. Patient denies any lower GI symptoms such as constipation, diarrhea, change in bowel habits, rectal bleeding, and rectal pain. Last EGD 4/22/2021 - [id number], benign-appearing stricture in the distal esophagus, gastritis  Last Colonoscopy 2017 - [facility], 5 year recall. Hx TAP   Positive family hx colon polyps - Sister    Assessment:     1. Chronic GERD    2. Esophageal dysphagia    3. Hx of colonic polyps            Plan:   - Continue Omeprazole 40 mg daily, refills provided   - Schedule Colonoscopy and EGD  Instruct on bowel prep. Nothing to eat or drink after midnight the day of the exam.  Unable to drive for 24 hours after the procedure. No aspirin or nonsteroidal anti-inflammatories for 5 days before procedure. I have discussed the benefits, alternatives, and risks (including bleeding, perforation and death)  for pursuing Endoscopy (EGD/Colonscopy/EUS/ERCP) with the patient and they are willing to continue. We also discussed the need for anesthesia, IV access, proper dietary changes, medication changes if necessary, and need for bowel prep (if ordered) prior to their Endoscopic procedure.   They are aware they must have someone accompany them to their scheduled procedure to drive them home - they agree to the above and are willing to continue. Orders  No orders of the defined types were placed in this encounter.     Medications  Orders Placed This Encounter   Medications    omeprazole (PRILOSEC) 40 MG delayed release capsule     Sig: Take 1 capsule by mouth daily     Dispense:  90 capsule     Refill:  3         Patient History:     Past Medical History:   Diagnosis Date    Asthma     Bipolar disorder ([address]     Chronic back pain     Depression     Diabetes mellitus (HCC)     GERD (gastroesophageal reflux disease)     Headache(784.0)     History of adenomatous polyp of colon     Hypothyroidism        Past Surgical History:   Procedure Laterality Date    ANKLE SURGERY Right     APPENDECTOMY      CHOLECYSTECTOMY      COLONOSCOPY  12/04/2006    Dr González Gomez, 5 yr recall    COLONOSCOPY  04/10/2012    Dr Perry Boyd, 5 yr recall    ENDOSCOPY, COLON, DIAGNOSTIC      about 2 yrs ago    HYSTERECTOMY (CERVIX STATUS UNKNOWN)  1980    total; due to endometreosis    LUMBAR SPINE SURGERY  01/27/2021    Dr Milagros Hayes x3    [place] COLONOSCOPY FLX DX W/COLLJ SPEC WHEN PFRMD N/A 07/31/2017    Dr Simon Monsivais-aborted distal to splenic flexure due to poor prep    [place] COLONOSCOPY W/BIOPSY SINGLE/MULTIPLE N/A 08/01/2017    Dr Jude Mota yr recall    [place] EGD TRANSORAL BIOPSY SINGLE/MULTIPLE N/A 07/31/2017    Dr ROSALES Monsivais-Reactive gastropathy, reflux esophagitis, gastritis    TONSILLECTOMY      UPPER GASTROINTESTINAL ENDOSCOPY  03/01/2012    Dr Guanaco Jewell and gastritis, biopsy neg for intestinal metaplasia, peptic stricture, no Mariya    UPPER GASTROINTESTINAL ENDOSCOPY  04/02/2018    Dr ROSALES Monsivais-w/dilation over wire-54 Bengali-gastritis/gastropathy    UPPER GASTROINTESTINAL ENDOSCOPY  04/02/2018    Dr ROSALES Monsivais-w/dilation over wire-54 Bengali-gastritis/gastropathy    UPPER GASTROINTESTINAL ENDOSCOPY N/A 07/26/2019    Dr ROSALES Monsivais-w/dilation over wire-54 F- benign appearing stricture in the distal esophagus, gastritis-Gastropathy    UPPER GASTROINTESTINAL ENDOSCOPY N/A 07/26/2019    Dr ROSALES Monsivais-w/dilation over wire-54 F- benign appearing stricture in the distal esophagus, gastritis-Gastropathy    UPPER GASTROINTESTINAL ENDOSCOPY N/A 04/22/2021    Dr Sanchez Monsivais-w/erl-[id number], benign-appearing stricture in the distal esophagus, gastritis    UPPER GASTROINTESTINAL ENDOSCOPY  04/30/2003    Dr Jose Antonio Renteria, esophagitis, HH, (+) Urea, no Bazan's       Family History   Problem Relation Age of Onset    No Known Problems Mother     Heart Disease Father     Colon Polyps Sister     Colon Cancer Neg Hx     Liver Cancer Neg Hx     Liver Disease Neg Hx     Esophageal Cancer Neg Hx     Rectal Cancer Neg Hx     Stomach Cancer Neg Hx        Social History     Socioeconomic History    Marital status:      Spouse name: Lay Edward    Number of children: 1    Years of education: 15 and cosmatology   Tobacco Use    Smoking status: Never    Smokeless tobacco: Never   Vaping Use    Vaping Use: Never used   Substance and Sexual Activity    Alcohol use: No    Drug use: No    Sexual activity: Yes     Partners: Male     Birth control/protection: Surgical     Comment: total hysterectomy       Current Outpatient Medications   Medication Sig Dispense Refill    omeprazole (PRILOSEC) 40 MG delayed release capsule Take 1 capsule by mouth daily 90 capsule 3    b complex vitamins capsule Take 1 capsule by mouth daily      gabapentin (NEURONTIN) 800 MG tablet Take 800 mg by mouth 3 times daily.        OXcarbazepine (TRILEPTAL) 300 MG tablet Take 300 mg by mouth daily      mirtazapine (REMERON) 15 MG tablet       oxybutynin (DITROPAN) 5 MG tablet Take 5 mg by mouth 2 times daily       fluticasone-vilanterol (BREO ELLIPTA) 100-25 MCG/INH AEPB inhaler Inhale 1 puff into the lungs Daily      montelukast (SINGULAIR) 10 MG tablet Take 10 mg by mouth      escitalopram (LEXAPRO) 10 MG tablet Take 1 tablet by mouth daily 30 tablet 3    donepezil (ARICEPT) 5 MG tablet Take 1 tablet by mouth nightly 30 tablet 3    ARIPiprazole (ABILIFY) 20 MG tablet Take one tablet daily 30 tablet 3    trihexyphenidyl (ARTANE) 5 MG tablet TAKE 1 TABLET BY MOUTH THREE TIMES DAILY 90 tablet 3    hydrOXYzine (ATARAX) 25 MG tablet Take 1 tablet by mouth two time daily as needed for anxiety 60 tablet 3    fluticasone (FLOVENT HFA) 110 MCG/ACT inhaler Inhale 1 puff into the lungs 2 times daily      SUMAtriptan (IMITREX) 100 MG tablet Take 100 mg by mouth once as needed for Migraine (MAY REPEAT IN 2 HRS. )      levothyroxine (SYNTHROID) 75 MCG tablet Take 75 mcg by mouth      metFORMIN (GLUCOPHAGE) 500 MG tablet Take 500 mg by mouth 2 times daily (with meals)       No current facility-administered medications for this visit. Allergies   Allergen Reactions    Codeine Itching       Review of Systems   Constitutional:  Negative for activity change, appetite change, fatigue, fever and unexpected weight change. HENT:  Positive for trouble swallowing. Respiratory:  Negative for cough, choking and shortness of breath. Cardiovascular:  Negative for chest pain. Gastrointestinal:  Negative for abdominal distention, abdominal pain, anal bleeding, blood in stool, constipation, diarrhea, nausea, rectal pain and vomiting. Reflux    Allergic/Immunologic: Negative for food allergies. All other systems reviewed and are negative. Objective:     /82   Pulse 97   Ht 5' 3\" (1.6 m)   Wt 187 lb (84.8 kg)   LMP  (LMP Unknown)   SpO2 98%   BMI 33.13 kg/m²     Physical Exam  Vitals reviewed. Constitutional:       General: She is not in acute distress. Appearance: She is well-developed. HENT:      Head: Normocephalic and atraumatic. Right Ear: External ear normal.      Left Ear: External ear normal.      Nose: Nose normal.   Eyes:      General: No scleral icterus. Right eye: No discharge. Left eye: No discharge.       Conjunctiva/sclera: Conjunctivae normal.      Pupils: Pupils are equal, round, and reactive to light. Cardiovascular:      Rate and Rhythm: Normal rate and regular rhythm. Heart sounds: Normal heart sounds. No murmur heard. Pulmonary:      Effort: Pulmonary effort is normal. No respiratory distress. Breath sounds: Normal breath sounds. No wheezing or rales. Abdominal:      General: Bowel sounds are normal. There is no distension. Palpations: Abdomen is soft. There is no mass. Tenderness: There is no abdominal tenderness. There is no guarding or rebound. Musculoskeletal:         General: Normal range of motion. Cervical back: Normal range of motion and neck supple. Skin:     General: Skin is warm and dry. Coloration: Skin is not pale. Neurological:      Mental Status: She is alert and oriented to person, place, and time. Psychiatric:         Behavior: Behavior normal.         Cognition and Memory: Memory is impaired.

## 2023-01-27 ENCOUNTER — TELEPHONE (OUTPATIENT)
Dept: PODIATRY | Facility: CLINIC | Age: 68
End: 2023-01-27
Payer: MEDICARE

## 2023-02-02 ENCOUNTER — TELEPHONE (OUTPATIENT)
Dept: PODIATRY | Facility: CLINIC | Age: 68
End: 2023-02-02
Payer: MEDICARE

## 2023-02-03 ENCOUNTER — OFFICE VISIT (OUTPATIENT)
Dept: PODIATRY | Facility: CLINIC | Age: 68
End: 2023-02-03
Payer: MEDICARE

## 2023-02-03 VITALS
SYSTOLIC BLOOD PRESSURE: 136 MMHG | HEIGHT: 63 IN | DIASTOLIC BLOOD PRESSURE: 84 MMHG | WEIGHT: 188 LBS | HEART RATE: 110 BPM | OXYGEN SATURATION: 97 % | BODY MASS INDEX: 33.31 KG/M2

## 2023-02-03 DIAGNOSIS — Z98.890 S/P FOOT SURGERY: Primary | ICD-10-CM

## 2023-02-03 PROCEDURE — 99024 POSTOP FOLLOW-UP VISIT: CPT | Performed by: PODIATRIST

## 2023-02-16 ENCOUNTER — TELEPHONE (OUTPATIENT)
Dept: GASTROENTEROLOGY | Age: 68
End: 2023-02-16

## 2023-02-16 NOTE — TELEPHONE ENCOUNTER
Called patient to remind them of their procedure with Dr. Lui Gilmore  at [facility]  on 2/20/23 arrive at [place] [address]

## 2023-02-20 ENCOUNTER — HOSPITAL ENCOUNTER (OUTPATIENT)
Age: 68
Setting detail: OUTPATIENT SURGERY
Discharge: HOME | End: 2023-02-20
Attending: INTERNAL MEDICINE | Admitting: INTERNAL MEDICINE

## 2023-02-20 ENCOUNTER — APPOINTMENT (OUTPATIENT)
Dept: OPERATING ROOM | Age: 68
End: 2023-02-20

## 2023-02-20 ENCOUNTER — ANESTHESIA (OUTPATIENT)
Dept: OPERATING ROOM | Age: 68
End: 2023-02-20

## 2023-02-20 ENCOUNTER — ANESTHESIA EVENT (OUTPATIENT)
Dept: OPERATING ROOM | Age: 68
End: 2023-02-20

## 2023-02-20 VITALS
OXYGEN SATURATION: 99 % | SYSTOLIC BLOOD PRESSURE: 119 MMHG | BODY MASS INDEX: 33.49 KG/M2 | DIASTOLIC BLOOD PRESSURE: 81 MMHG | HEIGHT: 63 IN | RESPIRATION RATE: 18 BRPM | HEART RATE: 59 BPM | TEMPERATURE: 99.1 F | WEIGHT: 189 LBS

## 2023-02-20 PROCEDURE — G0105 COLORECTAL SCRN; HI RISK IND: HCPCS

## 2023-02-20 PROCEDURE — G8907 PT DOC NO EVENTS ON DISCHARG: HCPCS

## 2023-02-20 PROCEDURE — 43248 EGD GUIDE WIRE INSERTION: CPT

## 2023-02-20 PROCEDURE — G8918 PT W/O PREOP ORDER IV AB PRO: HCPCS

## 2023-02-20 RX ORDER — LIDOCAINE HYDROCHLORIDE 10 MG/ML
INJECTION, SOLUTION EPIDURAL; INFILTRATION; INTRACAUDAL; PERINEURAL PRN
Status: DISCONTINUED | OUTPATIENT
Start: 2023-02-20 | End: 2023-02-20 | Stop reason: SDUPTHER

## 2023-02-20 RX ORDER — PROPOFOL 10 MG/ML
INJECTION, EMULSION INTRAVENOUS PRN
Status: DISCONTINUED | OUTPATIENT
Start: 2023-02-20 | End: 2023-02-20 | Stop reason: SDUPTHER

## 2023-02-20 RX ORDER — SODIUM CHLORIDE, SODIUM LACTATE, POTASSIUM CHLORIDE, CALCIUM CHLORIDE 600; 310; 30; 20 MG/100ML; MG/100ML; MG/100ML; MG/100ML
INJECTION, SOLUTION INTRAVENOUS CONTINUOUS
Status: DISCONTINUED | OUTPATIENT
Start: 2023-02-20 | End: 2023-02-20 | Stop reason: HOSPADM

## 2023-02-20 RX ADMIN — PROPOFOL 300 MG: 10 INJECTION, EMULSION INTRAVENOUS at 11:24

## 2023-02-20 RX ADMIN — SODIUM CHLORIDE, SODIUM LACTATE, POTASSIUM CHLORIDE, CALCIUM CHLORIDE: 600; 310; 30; 20 INJECTION, SOLUTION INTRAVENOUS at 11:10

## 2023-02-20 RX ADMIN — LIDOCAINE HYDROCHLORIDE 30 MG: 10 INJECTION, SOLUTION EPIDURAL; INFILTRATION; INTRACAUDAL; PERINEURAL at 11:24

## 2023-02-20 ASSESSMENT — PAIN - FUNCTIONAL ASSESSMENT: PAIN_FUNCTIONAL_ASSESSMENT: 0-10

## 2023-02-20 NOTE — OP NOTE
Endoscopic Procedure Note    Patient: Marcos Adorno : 1955  Med Rec#: [id number] Acc#: [id number]     Primary Care Provider Rani Castro DO  Referring Provider: BETY Peterson    Endoscopist: Michel Robles MD    Date of Procedure:  2023    Procedure:   1. EGD with wire-guided dilation- 45 F    Indications:   1. Reflux   2. Dysphagia     Anesthesia:  Sedation was administered by anesthesia who monitored the patient during the procedure. Estimated Blood Loss: minimal    Procedure:   After reviewing the patient's chart and obtaining informed consent, the patient was placed in the left lateral decubitus position. A forward-viewing Olympus endoscope was lubricated and inserted through the mouth into the oropharynx. Under direct visualization, the upper esophagus was intubated. The scope was advanced to the level of the third portion of duodenum. Scope was slowly withdrawn with careful inspection of the mucosal surfaces. The scope was retroflexed for inspection of the gastric fundus and incisura. Findings and maneuvers are listed in impression below. The patient tolerated the procedure well. The scope was removed. There were no immediate complications. Findings:   Esophagus: abnormal: there is a benign appearing stricture in the distal esophagus, dilated due to symptoms. A guidewire was placed through the endoscope and the endoscope was removed. A 54 French savory dilator was advanced down the length of the esophagus using a fixed-point wire technique. The dilator and guidewire were removed. The patient tolerated the procedure well. There is a small hiatal hernia present. Stomach:  abnormal: Mild mucosal changes suggestive of gastritis noted -  previously biopsied. Duodenum: normal      IMPRESSION:  1. Mild gastritis   2. S/p wire-guided dilation     RECOMMENDATIONS:    1. Await path results  2. Continue Prilosec   3.   Repeat EGD with dilation as needed    The results were discussed with the patient and family. A copy of the images obtained were given to the patient. Aniceto Garcia am scribing for and in the presence of Dr. Daved Cushing, MD.  Electronically signed by Maria Isabel Santana RN on 2/20/2023 at 11:02 AM    I personally performed the services described in this documentation as scribed by Federica Foley, and it appears accurate and complete.      Saleem Salas MD  2/20/2023

## 2023-02-20 NOTE — OP NOTE
Patient: Naun rTan : 1955  Med Rec#: [id number] Acc#: [id number]   Primary Care Provider Humaira Sherman DO    Date of Procedure:  2023    Endoscopist: Tarun Alegria MD    Referring Provider: Humaira Sherman DO, BETY Dougherty    Operation Performed: Colonoscopy     Indications: Screening- hx of polyps    Anesthesia:  Sedation was administered by anesthesia who monitored the patient during the procedure. I met with Ambar Vásquez prior to procedure. We discussed the procedure itself, and I have discussed the risks of endoscopy (including-- but not limited to-- pain, discomfort, bleeding potentially requiring second endoscopic procedure and/or blood transfusion, organ perforation requiring operative repair, damage to organs near the colon, infection, aspiration, cardiopulmonary/allergic reaction), benefits, indications to endoscopy. Additionally, we discussed options other than colonoscopy. The patient expressed understanding. All questions answered. The patient decided to proceed with the procedure. Signed informed consent was placed on the chart. Blood Loss: minimal    Withdrawal time: > 6 min  Bowel Prep: adequate     Complications: no immediate complications    DESCRIPTION OF PROCEDURE:     A time out was performed. After written informed consent was obtained, the patient was placed in the left lateral position. The perianal area was inspected, and a digital rectal exam was performed. A rectal exam was performed: normal tone, no palpable lesions. At this point, a forward viewing Olympus colonoscope was inserted into the anus and carefully advanced to the cecum. The cecum was identified by the ileocecal valve and the appendiceal orifice. The colonoscope was then slowly withdrawn with careful inspection of the mucosa in a linear and circumferential fashion. The scope was retroflexed in the rectum.  Suction was utilized during the procedure to remove as much air as possible from the bowel. The colonoscope was removed from the patient, and the procedure was terminated. Findings are listed below. Findings: The mucosa appeared normal throughout the entire examined colon. There was evidence of diverticular disease throughout the right colon. Retroflexion in the rectum was normal and revealed no further abnormalities. Recommendations:  1. Repeat colonoscopy: 5 years, due to hx of polyps      Findings and recommendations were discussed w/ the patient. A copy of the images was provided. Vicenta Ulloa am scribing for and in the presence of Dr. Tarun Alegria MD.  Electronically signed by Rossy Foley RN on 2/20/2023 at 11:03 AM    I personally performed the services described in this documentation as scribed by Itzel Guerin, and it appears accurate and complete.      Tarun Alegria MD  2/20/2023

## 2023-02-20 NOTE — ANESTHESIA PRE PROCEDURE
Department of Anesthesiology  Preprocedure Note       Name:  Tyson Littlejohn   Age:  69 y.o.  :  1955                                          MRN:  [id number]         Date:  2023      Surgeon: Kristin Valenzuela):  Donnell Celeste MD    Procedure: Procedure(s):  EGD BIOPSY  COLORECTAL CANCER SCREENING, NOT HIGH RISK    Medications prior to admission:   Prior to Admission medications    Medication Sig Start Date End Date Taking? Authorizing Provider   omeprazole (PRILOSEC) 40 MG delayed release capsule Take 1 capsule by mouth daily 23   BEYT Gallagher - NP   b complex vitamins capsule Take 1 capsule by mouth daily    Historical Provider, MD   gabapentin (NEURONTIN) 800 MG tablet Take 800 mg by mouth 3 times daily.   20   Historical Provider, MD   OXcarbazepine (TRILEPTAL) 300 MG tablet Take 300 mg by mouth daily 21   Historical Provider, MD   mirtazapine (REMERON) 15 MG tablet  21   Historical Provider, MD   oxybutynin (DITROPAN) 5 MG tablet Take 5 mg by mouth 2 times daily  3/17/20   Historical Provider, MD   fluticasone-vilanterol (BREO ELLIPTA) 100-25 MCG/INH AEPB inhaler Inhale 1 puff into the lungs Daily    Historical Provider, MD   montelukast (SINGULAIR) 10 MG tablet Take 10 mg by mouth 19   Historical Provider, MD   escitalopram (LEXAPRO) 10 MG tablet Take 1 tablet by mouth daily 19   BETY Bah   donepezil (ARICEPT) 5 MG tablet Take 1 tablet by mouth nightly 3/26/19   BETY Bah   ARIPiprazole (ABILIFY) 20 MG tablet Take one tablet daily 3/26/19   BETY Bah   trihexyphenidyl (ARTANE) 5 MG tablet TAKE 1 TABLET BY MOUTH THREE TIMES DAILY 19   BETY Bah   hydrOXYzine (ATARAX) 25 MG tablet Take 1 tablet by mouth two time daily as needed for anxiety 19   BETY Bah   fluticasone (FLOVENT HFA) 110 MCG/ACT inhaler Inhale 1 puff into the lungs 2 times daily    Historical Provider, MD SUMAtriptan (IMITREX) 100 MG tablet Take 100 mg by mouth once as needed for Migraine (MAY REPEAT IN 2 HRS.)    Historical Provider, MD   levothyroxine (SYNTHROID) 75 MCG tablet Take 75 mcg by mouth    Historical Provider, MD   metFORMIN (GLUCOPHAGE) 500 MG tablet Take 500 mg by mouth 2 times daily (with meals)    Historical Provider, MD       Current medications:    No current facility-administered medications for this encounter. Allergies: Allergies   Allergen Reactions    Codeine Itching       Problem List:    Patient Active Problem List   Diagnosis Code    NSAID long-term use Z79.1    History of esophageal stricture Z87.19    Hypothyroidism E03.9    Bipolar affective disorder, currently active ([address] F31.9    Chronic back pain M54.9, G89.29    Depression F32. A    Asthma J45.909    Hyperkalemia E87.5    Anemia of unknown etiology D64.9    Lactic acid blood increased R79.89    Urinary tract infection without hematuria F57.5    Metabolic encephalopathy W09.24    Sinus bradycardia R00.1    Essential hypertension I10    Rigor R68.89    Lithium toxicity T56.891A    Acute kidney injury (HCC) N17.9    Bradycardia R00.1    Encephalopathy, toxic G92.9    Delirium due to medical condition with behavioral disturbance F05    Bipolar affective disorder, mixed, severe, with psychotic behavior ([address] F31.64    Bipolar I disorder ([address] F31.9    High risk medication use Z79.899    Chronic GERD K21.9    Esophageal dysphagia R13.19    Bipolar I disorder, most recent episode mixed, severe without psychotic features ([address] F31.63    S/P dilatation of esophageal stricture Z98.890, Z87.19    Gastropathy K31.9    Esophageal stricture K22.2       Past Medical History:        Diagnosis Date    Asthma     Bipolar disorder (HCC)     Chronic back pain     Depression     Diabetes mellitus ([address]     GERD (gastroesophageal reflux disease)     Headache(784.0)     History of adenomatous polyp of colon  Hypothyroidism        Past Surgical History:        Procedure Laterality Date    ANKLE SURGERY Right     APPENDECTOMY      CHOLECYSTECTOMY      COLONOSCOPY  12/04/2006    Dr Gregorai Dacosta, 5 yr recall    COLONOSCOPY  04/10/2012    Dr Jannie Juárez, 5 yr recall    ENDOSCOPY, COLON, DIAGNOSTIC      about 2 yrs ago    HYSTERECTOMY (CERVIX STATUS UNKNOWN)  1980    total; due to endometreosis    LUMBAR SPINE SURGERY  01/27/2021    Dr Moraes Ion x3    DE COLONOSCOPY FLX DX W/COLLJ SPEC WHEN PFRMD N/A 07/31/2017    Dr Bony Monsivais-aborted distal to splenic flexure due to poor prep    DE COLONOSCOPY W/BIOPSY SINGLE/MULTIPLE N/A 08/01/2017    Dr Melendez Dotter yr recall    DE EGD TRANSORAL BIOPSY SINGLE/MULTIPLE N/A 07/31/2017    Dr ROSALES Monsivais-Reactive gastropathy, reflux esophagitis, gastritis    TONSILLECTOMY      UPPER GASTROINTESTINAL ENDOSCOPY  03/01/2012    Dr Kwan-w/mag-[id number] and gastritis, biopsy neg for intestinal metaplasia, peptic stricture, no Mariya    UPPER GASTROINTESTINAL ENDOSCOPY  04/02/2018    Dr ROSALES Monsivais-w/dilation over wire-54 [place]ian-gastritis/gastropathy    UPPER GASTROINTESTINAL ENDOSCOPY  04/02/2018    Dr ROSALES Monsivais-w/dilation over wire-54 [place]-gastritis/gastropathy    UPPER GASTROINTESTINAL ENDOSCOPY N/A 07/26/2019    Dr ROSALES Monsivais-w/dilation over wire-54 F- benign appearing stricture in the distal esophagus, gastritis-Gastropathy    UPPER GASTROINTESTINAL ENDOSCOPY N/A 07/26/2019    Dr ROSALES Monsivais-w/dilation over wire-54 F- benign appearing stricture in the distal esophagus, gastritis-Gastropathy    UPPER GASTROINTESTINAL ENDOSCOPY N/A 04/22/2021    Dr Bony Monsivais-w/[id number], benign-appearing stricture in the distal esophagus, gastritis    UPPER GASTROINTESTINAL ENDOSCOPY  04/30/2003    Dr Lita Kaiser, esophagitis, HH, (+) Urea, no Bazan's       Social History:    Social History     Tobacco Use    Smoking status: Some Days     Types: Cigarettes    Smokeless tobacco: Never   Substance Use Topics    Alcohol use: No                                Ready to quit: Not Answered  Counseling given: Not Answered      Vital Signs (Current):   Vitals:    02/20/23 1100 02/20/23 1102   BP:  116/74   Pulse: 64    Resp: 16    Temp: 98.6 °F (37 °C)    SpO2: 96%    Weight: 189 lb (85.7 kg)    Height: 5' 3\" (1.6 m)                                               BP Readings from Last 3 Encounters:   02/20/23 116/74   01/17/23 124/82   04/22/21 131/74       NPO Status: Time of last liquid consumption: 0700                        Time of last solid consumption: 2300                        Date of last liquid consumption: 02/20/23                        Date of last solid food consumption: 02/18/23    BMI:   Wt Readings from Last 3 Encounters:   02/20/23 189 lb (85.7 kg)   01/17/23 187 lb (84.8 kg)   04/22/21 172 lb (78 kg)     Body mass index is 33.48 kg/m². CBC:   Lab Results   Component Value Date/Time    WBC 5.9 03/04/2017 06:38 AM    RBC 3.71 03/04/2017 06:38 AM    HGB 10.0 03/04/2017 06:38 AM    HCT 33.0 03/04/2017 06:38 AM    MCV 88.9 03/04/2017 06:38 AM    RDW 14.7 03/04/2017 06:38 AM     03/04/2017 06:38 AM       CMP:   Lab Results   Component Value Date/Time     04/21/2017 08:12 AM    K 4.9 04/21/2017 08:12 AM     04/21/2017 08:12 AM    CO2 23 04/21/2017 08:12 AM    BUN 26 04/21/2017 08:12 AM    CREATININE 0.9 04/21/2017 08:12 AM    LABGLOM >60 04/21/2017 08:12 AM    GLUCOSE 75 04/21/2017 08:12 AM    PROT 6.5 04/21/2017 08:12 AM    CALCIUM 9.3 04/21/2017 08:12 AM    BILITOT 0.3 04/21/2017 08:12 AM    ALKPHOS 82 04/21/2017 08:12 AM    AST 13 04/21/2017 08:12 AM    ALT 19 04/21/2017 08:12 AM       POC Tests: No results for input(s): POCGLU, POCNA, POCK, POCCL, POCBUN, POCHEMO, POCHCT in the last 72 hours.     Coags:   Lab Results   Component Value Date/Time    APTT 23.3 02/04/2017 04:52 PM       HCG (If Applicable): No results found for: PREGTESTUR, PREGSERUM, HCG, HCGQUANT     ABGs:   Lab Results   Component Value Date/Time    PHART 7.500 02/04/2017 05:08 PM    PO2ART 249.0 02/04/2017 05:08 PM    RES4ZCR 27.0 02/04/2017 05:08 PM    TTK0PCD 21.1 02/04/2017 05:08 PM    BEART -1.3 02/04/2017 05:08 PM    X3EPCWDL 95.7 02/04/2017 05:08 PM        Type & Screen (If Applicable):  No results found for: LABABO, LABRH    Drug/Infectious Status (If Applicable):  No results found for: HIV, HEPCAB    COVID-19 Screening (If Applicable):   Lab Results   Component Value Date/Time    COVID19 NEG 04/19/2021 09:49 AM           Anesthesia Evaluation  Patient summary reviewed and Nursing notes reviewed  Airway: Mallampati: II  TM distance: >3 FB   Neck ROM: full  Mouth opening: > = 3 FB   Dental: normal exam         Pulmonary:normal exam    (+) asthma:                            Cardiovascular:  Exercise tolerance: good (>4 METS),   (+) hypertension:,                   Neuro/Psych:   (+) headaches:, psychiatric history:            GI/Hepatic/Renal:   (+) GERD:,           Endo/Other: Negative Endo/Other ROS   (+) Diabetes, hypothyroidism::., .                 Abdominal:             Vascular: negative vascular ROS. Other Findings:           Anesthesia Plan      general and TIVA     ASA 3       Induction: intravenous. Anesthetic plan and risks discussed with patient. Plan discussed with CRNA.                     BETY Parker - CRNA   2/20/2023

## 2023-02-20 NOTE — H&P
Patient Name: Russell Ortega  : 1955  MRN: [id number]  DATE: 23    Allergies: Allergies   Allergen Reactions    Codeine Itching        ENDOSCOPY  History and Physical    Procedure:    [] Diagnostic Colonoscopy       [x] Screening Colonoscopy  [x] EGD      [] ERCP      [] EUS       [] Other    [x] Previous office notes/History and Physical reviewed from the patients chart. Please see EMR for further details of HPI. I have examined the patient's status immediately prior to the procedure and:      Indications/HPI:    []Abdominal Pain   []Barretts  []Screening/Surveillance   []History of Polyps  [x]Dysphagia            [] +Cologard/DNA testing  []Abnormal Imaging              []EOE Hx              [] Family Hx of CRC/Polyps  []Anemia                            []Food Impaction       []Recent Poor Prep  []GI Bleed             []Lymphadenopathy  [x]History of Polyps  []Change in bowel habits [x]Heartburn/Reflux  []Cancer- GI/Lung  []Chest Pain - Non Cardiac []Heme (+) Stool []Ulcers  []Constipation  []Hemoptysis  []Incontinence    []Diarrhea  []Hypoxemia  []Rectal Bleed (BRBPR)  []Nausea/Vomiting   [] Varices  []Crohns/Colitis  []Pancreatic Cyst   [] Cirrhosis   []Pancreatitis    []Abnormal MRCP  []Elevated LFT [] Stent Removal, Previous ERCP  []Other:     Anesthesia:   [x] MAC [] Moderate Sedation   [] General   [] None     ROS: 12 pt Review of Symptoms was negative unless mentioned above    Medications:   Prior to Admission medications    Medication Sig Start Date End Date Taking? Authorizing Provider   omeprazole (PRILOSEC) 40 MG delayed release capsule Take 1 capsule by mouth daily 23   BETY Mora - NP   b complex vitamins capsule Take 1 capsule by mouth daily    Historical Provider, MD   gabapentin (NEURONTIN) 800 MG tablet Take 800 mg by mouth 3 times daily.   20   Historical Provider, MD   OXcarbazepine (TRILEPTAL) 300 MG tablet Take 300 mg by mouth daily 21 Historical Provider, MD   mirtazapine (REMERON) 15 MG tablet  1/12/21   Historical Provider, MD   oxybutynin (DITROPAN) 5 MG tablet Take 5 mg by mouth 2 times daily  3/17/20   Historical Provider, MD   fluticasone-vilanterol (BREO ELLIPTA) 100-25 MCG/INH AEPB inhaler Inhale 1 puff into the lungs Daily    Historical Provider, MD   montelukast (SINGULAIR) 10 MG tablet Take 10 mg by mouth 5/31/19   Historical Provider, MD   escitalopram (LEXAPRO) 10 MG tablet Take 1 tablet by mouth daily 5/1/19   BETY Weir   donepezil (ARICEPT) 5 MG tablet Take 1 tablet by mouth nightly 3/26/19   BETY Weir   ARIPiprazole (ABILIFY) 20 MG tablet Take one tablet daily 3/26/19   BETY Weir   trihexyphenidyl (ARTANE) 5 MG tablet TAKE 1 TABLET BY MOUTH THREE TIMES DAILY 2/12/19   BETY Weir   hydrOXYzine (ATARAX) 25 MG tablet Take 1 tablet by mouth two time daily as needed for anxiety 2/12/19   BETY Weir   fluticasone (FLOVENT HFA) 110 MCG/ACT inhaler Inhale 1 puff into the lungs 2 times daily    Historical Provider, MD   SUMAtriptan (IMITREX) 100 MG tablet Take 100 mg by mouth once as needed for Migraine (MAY REPEAT IN 2 HRS.)    Historical Provider, MD   levothyroxine (SYNTHROID) 75 MCG tablet Take 75 mcg by mouth    Historical Provider, MD   metFORMIN (GLUCOPHAGE) 500 MG tablet Take 500 mg by mouth 2 times daily (with meals)    Historical Provider, MD       Past Medical History:  Past Medical History:   Diagnosis Date    Asthma     Bipolar disorder ([address]     Chronic back pain     Depression     Diabetes mellitus (HCC)     GERD (gastroesophageal reflux disease)     Headache(784.0)     History of adenomatous polyp of colon     Hypothyroidism        Past Surgical History:  Past Surgical History:   Procedure Laterality Date    ANKLE SURGERY Right     APPENDECTOMY      CHOLECYSTECTOMY      COLONOSCOPY  12/04/2006    Dr Mayi Corley, 5 yr recall COLONOSCOPY  04/10/2012    Dr Ramila Cheung, 5 yr recall    ENDOSCOPY, COLON, DIAGNOSTIC      about 2 yrs ago    HYSTERECTOMY (CERVIX STATUS UNKNOWN)  1980    total; due to endometreosis    LUMBAR SPINE SURGERY  01/27/2021    Dr Radha Childs x3    LA COLONOSCOPY FLX DX W/COLLJ SPEC WHEN PFRMD N/A 07/31/2017    Dr Ivania Monsivais-aborted distal to splenic flexure due to poor prep    LA COLONOSCOPY W/BIOPSY SINGLE/MULTIPLE N/A 08/01/2017    Dr Gisselle Jamison yr recall    LA EGD TRANSORAL BIOPSY SINGLE/MULTIPLE N/A 07/31/2017    Dr ROSALES Monsivais-Reactive gastropathy, reflux esophagitis, gastritis    TONSILLECTOMY      UPPER GASTROINTESTINAL ENDOSCOPY  03/01/2012    Dr Sterling Irizarry and gastritis, biopsy neg for intestinal metaplasia, peptic stricture, no Mariya    UPPER GASTROINTESTINAL ENDOSCOPY  04/02/2018    Dr ROSALES Monsivais-w/dilation over wire-54 [place]-gastritis/gastropathy    UPPER GASTROINTESTINAL ENDOSCOPY  04/02/2018    Dr ROSALES Monsivais-w/dilation over wire-54 [place]-gastritis/gastropathy    UPPER GASTROINTESTINAL ENDOSCOPY N/A 07/26/2019    Dr ROSALES Monsivais-w/dilation over wire-54 F- benign appearing stricture in the distal esophagus, gastritis-Gastropathy    UPPER GASTROINTESTINAL ENDOSCOPY N/A 07/26/2019    Dr ROSALES Monsivais-w/dilation over wire-54 F- benign appearing stricture in the distal esophagus, gastritis-Gastropathy    UPPER GASTROINTESTINAL ENDOSCOPY N/A 04/22/2021    Dr Ivania Monsivais-w/[id number], benign-appearing stricture in the distal esophagus, gastritis    UPPER GASTROINTESTINAL ENDOSCOPY  04/30/2003    Dr Nick Murphy, esophagitis, HH, (+) Urea, no Bazan's       Social History:  Social History     Tobacco Use    Smoking status: Never    Smokeless tobacco: Never   Vaping Use    Vaping Use: Never used   Substance Use Topics    Alcohol use: No    Drug use: No       Vital Signs: There were no vitals filed for this visit.      Physical Exam:  Cardiac:  [x]WNL  []Comments:  Pulmonary:  [x]WNL []Comments:  Neuro/Mental Status:  [x]WNL  []Comments:  Abdominal:  [x]WNL    []Comments:  Other:   []WNL  []Comments:    Informed Consent:  The risks and benefits of the procedure have been discussed with either the patient or if they cannot consent, their representative. Assessment:  Patient examined and appropriate for planned sedation and procedure. Plan:  Proceed with planned sedation and procedure as above. Yonis Whiteside am scribing for and in the presence of Dr. Lili Tierney MD.  Electronically signed by Zhou Villanueva RN on 2/20/2023 at 11:01 AM    I personally performed the services described in this documentation as scribed by Glendy Pina, and it appears accurate and complete.      Lili Tierney MD  2/20/2023

## 2023-02-20 NOTE — DISCHARGE INSTRUCTIONS
EGD RECOMMENDATIONS:    1.  Await path results  2.  Continue Prilosec   3.  Repeat EGD with dilation as needed      Colonoscopy Recommendations:  1. Repeat colonoscopy: 5 years, due to hx of polyps    POST-OP ORDERS: ENDOSCOPY & COLONOSCOPY:    1. Rest today.    2. DO NOT eat or drink until wide awake; eat your usual diet today in moderate amount only.    3. DO NOT drive today.    4. Call physician if you have severe pain, vomiting, fever, rectal bleeding or black bowel movements.    5.  If a biopsy was taken or a polyp removed, you should expect to hear results in about 7-10 days.  If you have heard nothing from your physician by then, call the office for results.    6.  Discharge home when patient awake, vitals signs stable and tolerating liquids.     
Statement Selected

## 2023-02-20 NOTE — ANESTHESIA POSTPROCEDURE EVALUATION
Department of Anesthesiology  Postprocedure Note    Patient: Kendra Steen  MRN: [id number]  YOB: 1955  Date of evaluation: 2/20/2023      Procedure Summary     Date: 02/20/23 [facility]O [facility] / [address]    Anesthesia Start: 1117 Anesthesia Stop:     Procedures:       EGD BIOPSY (Esophagus)      COLORECTAL CANCER SCREENING, NOT HIGH RISK (Abdomen)      EGD DILATION SAVORY 54FR (Esophagus) Diagnosis:       Gastroesophageal reflux disease without esophagitis      Dysphagia, unspecified type      Hx of adenomatous polyp of colon      (Gastroesophageal reflux disease without esophagitis [K21.9])      (Dysphagia, unspecified type [R13.10])      (Hx of adenomatous polyp of colon [Z86.010])    Surgeons: Jyoti Jansen MD Responsible Provider: BETY Fitzpatrick CRNA    Anesthesia Type: general, TIVA ASA Status: 3          Anesthesia Type: No value filed.     Zeke Phase I:      Zeke Phase II:        Anesthesia Post Evaluation    Patient location during evaluation: bedside  Patient participation: complete - patient participated  Level of consciousness: sleepy but conscious  Pain score: 0  Airway patency: patent  Nausea & Vomiting: no nausea and no vomiting  Complications: no  Cardiovascular status: blood pressure returned to baseline  Respiratory status: acceptable, room air and spontaneous ventilation  Hydration status: euvolemic

## 2023-08-07 ENCOUNTER — TELEPHONE (OUTPATIENT)
Dept: PULMONOLOGY | Facility: CLINIC | Age: 68
End: 2023-08-07
Payer: MEDICARE

## 2023-08-29 PROBLEM — F31.12 BIPOLAR 1 DISORDER, MANIC, MODERATE: Chronic | Status: ACTIVE | Noted: 2019-01-23

## 2024-03-28 NOTE — TELEPHONE ENCOUNTER
03- Called [facility] for Ardián (Spouse) apt scheduled 04- RADHA APRN             03- Called spoke with Adrián (Spouse) notified that the patient is needing an office apt.     Last apt 01-  APRN       He stated that he will have her return a call the office

## 2024-04-02 RX ORDER — OMEPRAZOLE 40 MG/1
CAPSULE, DELAYED RELEASE ORAL DAILY
Qty: 90 CAPSULE | Refills: 3 | Status: SHIPPED | OUTPATIENT
Start: 2024-04-02

## 2024-04-23 ENCOUNTER — TRANSCRIBE ORDERS (OUTPATIENT)
Dept: ADMINISTRATIVE | Facility: HOSPITAL | Age: 69
End: 2024-04-23
Payer: MEDICARE

## 2024-04-23 ENCOUNTER — HOSPITAL ENCOUNTER (OUTPATIENT)
Dept: CT IMAGING | Facility: HOSPITAL | Age: 69
Discharge: HOME | End: 2024-04-23
Admitting: PHYSICIAN ASSISTANT
Payer: MEDICARE

## 2024-04-23 DIAGNOSIS — R11.2 NAUSEA AND VOMITING, UNSPECIFIED VOMITING TYPE: Primary | ICD-10-CM

## 2024-04-23 DIAGNOSIS — R11.2 NAUSEA AND VOMITING, UNSPECIFIED VOMITING TYPE: ICD-10-CM

## 2024-04-23 LAB — CREAT BLDA-MCNC: 1 MG/DL (ref 0.6–1.3)

## 2024-04-23 PROCEDURE — 74177 CT ABD & PELVIS W/CONTRAST: CPT

## 2024-04-23 PROCEDURE — 25510000001 IOPAMIDOL 61 % SOLUTION: Performed by: PHYSICIAN ASSISTANT

## 2024-04-23 PROCEDURE — 82565 ASSAY OF CREATININE: CPT

## 2024-04-23 RX ADMIN — IOPAMIDOL 100 ML: 612 INJECTION, SOLUTION INTRAVENOUS at 15:05

## 2024-05-01 ENCOUNTER — TELEPHONE (OUTPATIENT)
Dept: GASTROENTEROLOGY | Age: 69
End: 2024-05-01

## 2024-05-01 NOTE — TELEPHONE ENCOUNTER
05- Called and notified Adrián Spouse that the call looks like it was in reference to the Apt.     Rescheduled for 05-

## 2024-05-01 NOTE — TELEPHONE ENCOUNTER
[facility]  called this morning returned a missed call, on checking their charts, the only thing I could see was that the patient has no showed an appointment with this office. I advised Mr. Vásquez and he advised that they were not ready to r/s this morning, but they would call the office another time to r/s.     Thank you.

## 2024-05-15 ENCOUNTER — OFFICE VISIT (OUTPATIENT)
Dept: GASTROENTEROLOGY | Age: 69
End: 2024-05-15
Payer: MEDICARE

## 2024-05-15 VITALS
HEART RATE: 64 BPM | OXYGEN SATURATION: 96 % | BODY MASS INDEX: 30.36 KG/M2 | WEIGHT: 165 LBS | SYSTOLIC BLOOD PRESSURE: 120 MMHG | HEIGHT: 62 IN | DIASTOLIC BLOOD PRESSURE: 80 MMHG

## 2024-05-15 DIAGNOSIS — K21.9 CHRONIC GERD: Primary | ICD-10-CM

## 2024-05-15 DIAGNOSIS — R13.10 DYSPHAGIA, UNSPECIFIED TYPE: ICD-10-CM

## 2024-05-15 PROCEDURE — 1123F ACP DISCUSS/DSCN MKR DOCD: CPT | Performed by: NURSE PRACTITIONER

## 2024-05-15 PROCEDURE — G8428 CUR MEDS NOT DOCUMENT: HCPCS | Performed by: NURSE PRACTITIONER

## 2024-05-15 PROCEDURE — 1090F PRES/ABSN URINE INCON ASSESS: CPT | Performed by: NURSE PRACTITIONER

## 2024-05-15 PROCEDURE — 99214 OFFICE O/P EST MOD 30 MIN: CPT | Performed by: NURSE PRACTITIONER

## 2024-05-15 PROCEDURE — G8417 CALC BMI ABV UP PARAM F/U: HCPCS | Performed by: NURSE PRACTITIONER

## 2024-05-15 PROCEDURE — 3074F SYST BP LT 130 MM HG: CPT | Performed by: NURSE PRACTITIONER

## 2024-05-15 PROCEDURE — 3079F DIAST BP 80-89 MM HG: CPT | Performed by: NURSE PRACTITIONER

## 2024-05-15 PROCEDURE — G8400 PT W/DXA NO RESULTS DOC: HCPCS | Performed by: NURSE PRACTITIONER

## 2024-05-15 PROCEDURE — 3017F COLORECTAL CA SCREEN DOC REV: CPT | Performed by: NURSE PRACTITIONER

## 2024-05-15 PROCEDURE — 4004F PT TOBACCO SCREEN RCVD TLK: CPT | Performed by: NURSE PRACTITIONER

## 2024-05-15 ASSESSMENT — ENCOUNTER SYMPTOMS
COUGH: 0
CONSTIPATION: 0
BLOOD IN STOOL: 0
TROUBLE SWALLOWING: 1
ANAL BLEEDING: 0
VOMITING: 0
RECTAL PAIN: 0
NAUSEA: 0
CHOKING: 0
ABDOMINAL DISTENTION: 0
DIARRHEA: 0

## 2024-05-15 NOTE — PROGRESS NOTES
Subjective:     Patient ID: Jeanie Vásquez is a 68 y.o. female  PCP: Jana Chavez DO  Referring Provider: No ref. provider found    HPI  Patient presents to the office today with the following complaints: Medication Refill      Pt seen today for follow up, medication refills.  Hx chronic GERD, currently treated with Omeprazole 40 mg daily.  She has c/o issues swallowing.  This began a few months ago.  \"I need it stretched again.\"  She states breads will get stuck.      Last EGD 2/20/2023 - [id number] appearing stricture in the distal esophagus, gastritis   Last Colonoscopy 2/20/2023 - diverticular dx, 5 year recall   Family hx colon polyps - Sister    Assessment:     1. Chronic GERD    2. Dysphagia, unspecified type              Plan:   - Continue Omeprazole 40 mg daily  - Follow up yearly or sooner if needed  - Call with any questions or concerns   - Schedule EGD  Nothing to eat or drink after midnight.  No driving for 24 hours after procedure. Bring a  to procedure.  No aspirin, NSAIDs, fish oil 5 days before procedure.  I have discussed the benefits, alternatives, and risks (including bleeding, perforation and death)  for pursuing Endoscopy (EGD/Colonscopy/EUS/ERCP) with the patient and they are willing to continue. We also discussed the need for anesthesia, IV access, proper dietary changes, medication changes if necessary, and need for bowel prep (if ordered) prior to their Endoscopic procedure.  They are aware they must have someone accompany them to their scheduled procedure to drive them home - they agree to the above and are willing to continue.         Orders  No orders of the defined types were placed in this encounter.    Medications  No orders of the defined types were placed in this encounter.        Patient History:     Past Medical History:   Diagnosis Date    Asthma     Bipolar disorder (HCC)     Chronic back pain     Depression     Diabetes mellitus (HCC)     GERD

## 2024-06-11 ENCOUNTER — TELEPHONE (OUTPATIENT)
Dept: GASTROENTEROLOGY | Age: 69
End: 2024-06-11

## 2024-06-11 NOTE — TELEPHONE ENCOUNTER
Called patient to remind them of their procedure with Dr. Hung Monsivais at [facility]  on 6/13/24 to arrive at 1215AM     .NO

## 2024-06-12 NOTE — TELEPHONE ENCOUNTER
Jeanie requests a call back please, she wishes to r/s procedure for another Wednesday as she got called into work.    Thank you.

## 2024-10-30 DIAGNOSIS — M25.561 RIGHT KNEE PAIN, UNSPECIFIED CHRONICITY: Primary | ICD-10-CM

## 2024-12-01 ENCOUNTER — APPOINTMENT (OUTPATIENT)
Dept: GENERAL RADIOLOGY | Facility: HOSPITAL | Age: 69
End: 2024-12-01
Payer: COMMERCIAL

## 2024-12-01 ENCOUNTER — HOSPITAL ENCOUNTER (EMERGENCY)
Facility: HOSPITAL | Age: 69
Discharge: HOME | End: 2024-12-01
Payer: COMMERCIAL

## 2024-12-01 ENCOUNTER — APPOINTMENT (OUTPATIENT)
Dept: CT IMAGING | Facility: HOSPITAL | Age: 69
End: 2024-12-01
Payer: COMMERCIAL

## 2024-12-01 VITALS
DIASTOLIC BLOOD PRESSURE: 85 MMHG | TEMPERATURE: 98.5 F | BODY MASS INDEX: 30.73 KG/M2 | RESPIRATION RATE: 18 BRPM | HEART RATE: 55 BPM | HEIGHT: 62 IN | SYSTOLIC BLOOD PRESSURE: 149 MMHG | WEIGHT: 167 LBS | OXYGEN SATURATION: 97 %
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
## 2024-12-01 PROCEDURE — 72131 CT LUMBAR SPINE W/O DYE: CPT

## 2024-12-01 PROCEDURE — 72192 CT PELVIS W/O DYE: CPT

## 2024-12-01 PROCEDURE — 73552 X-RAY EXAM OF FEMUR 2/>: CPT

## 2024-12-01 PROCEDURE — 99284 EMERGENCY DEPT VISIT MOD MDM: CPT

## 2024-12-01 PROCEDURE — 70450 CT HEAD/BRAIN W/O DYE: CPT

## 2024-12-01 PROCEDURE — 72125 CT NECK SPINE W/O DYE: CPT

## 2024-12-01 PROCEDURE — 73562 X-RAY EXAM OF KNEE 3: CPT

## 2024-12-01 PROCEDURE — 72128 CT CHEST SPINE W/O DYE: CPT

## 2024-12-01 PROCEDURE — 73590 X-RAY EXAM OF LOWER LEG: CPT

## 2024-12-01 PROCEDURE — 73030 X-RAY EXAM OF SHOULDER: CPT
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
## 2024-12-01 NOTE — DISCHARGE INSTRUCTIONS
It was very nice to meet you, Ivonne. Thank you for allowing us to take care of you today at [facility].    Today you were seen in the emergency department for your symptoms. Please understand that an ER evaluation is just the start of your evaluation. We do the best we can, but we are often unable to fully find what is causing your symptoms from one evaluation.  Because of this, the goal is to determine whether you need to be evaluated in the hospital or if it is safe for you to go home and see other doctors provided such as primary care physicians or specialist on an outpatient basis.     Like we discussed, I strongly urge that you follow up with your primary care doctor. Please call their office to set up an appointment as soon as possible so that you can be re-evaluated for improvement in your symptoms or for any other questions.  I have provided the information needed, including phone number, to call to set up an appointment below in these discharge papers.     Educational material has also been provided in the following pages regarding what we have discussed today.     Please return to the emergency room within 12-48 hours if you experience symptoms such as the following:   Fever, chills, chest pain or shortness of breath, pain with inspiration/expiration, pain that travels to your arms, neck or back, nausea, vomiting, severe headache, tearing pain in your chest, dizziness, feel as though you are about to pass out, OR if you have any worsening symptoms, or any other concerns.

## 2025-01-21 ENCOUNTER — OFFICE VISIT (OUTPATIENT)
Age: 70
End: 2025-01-21
Payer: MEDICARE

## 2025-01-21 VITALS — HEIGHT: 62 IN | BODY MASS INDEX: 27.05 KG/M2 | WEIGHT: 147 LBS

## 2025-01-21 DIAGNOSIS — M25.561 CHRONIC PAIN OF RIGHT KNEE: Primary | ICD-10-CM

## 2025-01-21 DIAGNOSIS — M17.11 PRIMARY OSTEOARTHRITIS OF RIGHT KNEE: ICD-10-CM

## 2025-01-21 DIAGNOSIS — G89.29 CHRONIC PAIN OF RIGHT KNEE: Primary | ICD-10-CM

## 2025-01-21 PROCEDURE — 3017F COLORECTAL CA SCREEN DOC REV: CPT | Performed by: PHYSICIAN ASSISTANT

## 2025-01-21 PROCEDURE — 99214 OFFICE O/P EST MOD 30 MIN: CPT | Performed by: PHYSICIAN ASSISTANT

## 2025-01-21 PROCEDURE — 1159F MED LIST DOCD IN RCRD: CPT | Performed by: PHYSICIAN ASSISTANT

## 2025-01-21 PROCEDURE — 1123F ACP DISCUSS/DSCN MKR DOCD: CPT | Performed by: PHYSICIAN ASSISTANT

## 2025-01-21 PROCEDURE — G8400 PT W/DXA NO RESULTS DOC: HCPCS | Performed by: PHYSICIAN ASSISTANT

## 2025-01-21 PROCEDURE — G8427 DOCREV CUR MEDS BY ELIG CLIN: HCPCS | Performed by: PHYSICIAN ASSISTANT

## 2025-01-21 PROCEDURE — 20610 DRAIN/INJ JOINT/BURSA W/O US: CPT | Performed by: PHYSICIAN ASSISTANT

## 2025-01-21 PROCEDURE — 4004F PT TOBACCO SCREEN RCVD TLK: CPT | Performed by: PHYSICIAN ASSISTANT

## 2025-01-21 PROCEDURE — G8417 CALC BMI ABV UP PARAM F/U: HCPCS | Performed by: PHYSICIAN ASSISTANT

## 2025-01-21 PROCEDURE — 1090F PRES/ABSN URINE INCON ASSESS: CPT | Performed by: PHYSICIAN ASSISTANT

## 2025-01-21 RX ORDER — BUPIVACAINE HYDROCHLORIDE 5 MG/ML
4 INJECTION, SOLUTION PERINEURAL ONCE
Status: COMPLETED | OUTPATIENT
Start: 2025-01-21 | End: 2025-01-21

## 2025-01-21 RX ORDER — TRIAMCINOLONE ACETONIDE 40 MG/ML
40 INJECTION, SUSPENSION INTRA-ARTICULAR; INTRAMUSCULAR ONCE
Status: COMPLETED | OUTPATIENT
Start: 2025-01-21 | End: 2025-01-21

## 2025-01-21 RX ADMIN — TRIAMCINOLONE ACETONIDE 40 MG: 40 INJECTION, SUSPENSION INTRA-ARTICULAR; INTRAMUSCULAR at 11:55

## 2025-01-21 RX ADMIN — BUPIVACAINE HYDROCHLORIDE 20 MG: 5 INJECTION, SOLUTION PERINEURAL at 11:55

## 2025-01-21 ASSESSMENT — ENCOUNTER SYMPTOMS
BACK PAIN: 0
COLOR CHANGE: 0

## 2025-01-21 NOTE — PROGRESS NOTES
[facility]  [facility]  [address] [facility] [place] [address]  Dept: [phone]  Dept Fax: [phone]  Loc: [phone]    Jeanie Vásquez is a 69 y.o. female who presents today for her medical conditions/complaints as noted below.  Jeanie Vásquez is complaining of Consultation (Right Knee)        HPI:   Patient is a pleasant 69-year-old female presenting to the clinic today as a consult for right knee pain.  She states that this has been bothering her off and on for about 6 months.  She did have a fall about 3 months ago but did not injure or fall onto the knee during this.  She denies any catching or locking of the knee.  She has been trying over-the-counter anti-inflammatories as well as a muscle relaxer that her primary care provided to her with little relief.        Past Medical History:   Diagnosis Date    Asthma     Bipolar disorder (HCC)     Chronic back pain     Depression     Diabetes mellitus (HCC)     GERD (gastroesophageal reflux disease)     Headache(784.0)     History of adenomatous polyp of colon     Hypothyroidism        Past Surgical History:   Procedure Laterality Date    ANKLE SURGERY Right     APPENDECTOMY      CHOLECYSTECTOMY      COLONOSCOPY  12/04/2006    Dr Kwan-Normal, 5 yr recall    COLONOSCOPY  04/10/2012    Dr Kwan-AP, 5 yr recall    COLONOSCOPY N/A 02/20/2023    Dr ROSALES Monsivais-Diverticular disease, 5 yr recall    HYSTERECTOMY (CERVIX STATUS UNKNOWN)  1980    total; due to endometreosis    LUMBAR SPINE SURGERY  01/27/2021    Dr Hutson x3    NE COLONOSCOPY FLX DX W/COLLJ SPEC WHEN PFRMD N/A 07/31/2017    Dr ROSALES Monsivais-aborted distal to splenic flexure due to poor prep    NE COLONOSCOPY W/BIOPSY SINGLE/MULTIPLE N/A 08/01/2017    Dr ROSALES Monsivais-HP-5 yr recall    NE EGD TRANSORAL BIOPSY SINGLE/MULTIPLE N/A 07/31/2017    Dr ROSALES Monsivais-Reactive gastropathy, reflux esophagitis, gastritis    TONSILLECTOMY      UPPER

## 2025-01-21 NOTE — ASSESSMENT & PLAN NOTE
AP standing, lateral, PA flex, sunrise x-ray views were obtained today in office of the right knee.  These do demonstrate mild to moderate degenerative changes of the right knee with joint space narrowing most notably in the medial femorotibial compartment.  There is no acute osseous abnormality appreciated.    We did discuss several different treatment options and ultimately the patient decided that she would like to try an intra-articular steroid injection at today's visit. The risks and benefits of the procedure were explained to the patient including, but not limited to, the risk for infection, allergic reaction, increased blood sugar and/or blood pressure, continued pain, continued decreased ROM, need for surgery. They conveyed their understanding and willingness to proceed. The right knee was prepped with chloraprep and using a superolateral approach was injected with 40 milligrams of kenalog and 4 milliliters of bupivicaine with a 21 gauge inch and a half needle. They tolerated this well and there were no post procedural complications. We will see them back in 3 months to have this updated.     [No Acute Distress] : no acute distress [EOMI] : extraocular movements intact [Supple] : supple [Clear to Auscultation] : lungs were clear to auscultation bilaterally [Normal S1, S2] : normal S1 and S2 [No Edema] : there was no peripheral edema [Soft] : abdomen soft [Non Tender] : non-tender [No Rash] : no rash [Normal Gait] : normal gait [Normal Affect] : the affect was normal [de-identified] : hypertonicity of lumbar muscle, pain in rom

## 2025-04-29 ENCOUNTER — TELEPHONE (OUTPATIENT)
Age: 70
End: 2025-04-29

## 2025-04-29 NOTE — TELEPHONE ENCOUNTER
"  Caller: Ivonne Dumont \"Sharon\"    Relationship: Self    Best call back number: [phone]  [phone]    What is the best time to reach you: ANY     Who are you requesting to speak with (clinical staff, provider,  specific staff member): CLINICAL      What was the call regarding: PATIENT HAD RIGHT FOOT SURGERY IN PAST AND IS VERY CONCERNED ABOUT RECENT SWELLING ON BOTH SIDES OF THE FOOT.  OFFICE ADVISED TO SEND A MESSAGE    Is it okay if the provider responds through [employer]: PLEASE CALL   "

## 2025-04-30 NOTE — TELEPHONE ENCOUNTER
04- Called the patient as that the pharmacy has requested a refill on your current medication-Omeprazole.  If regular medications are being prescribed our Providers prefers that patients have a yearly follow up apt.     Your last apt was on 05- with Smita ALBERT        She stated that she doesn't need a refill at this time as that she found a whole bottle       Apt made for 05-     Routed to RADHA ALBERT

## 2025-05-01 RX ORDER — OMEPRAZOLE 40 MG/1
CAPSULE, DELAYED RELEASE ORAL DAILY
Qty: 90 CAPSULE | Refills: 3 | Status: SHIPPED | OUTPATIENT
Start: 2025-05-01

## 2025-05-01 NOTE — TELEPHONE ENCOUNTER
"Hub staff attempted to follow warm transfer process and was unsuccessful     Caller: Ivonne Dumont \"Sharon\"    Relationship to patient: Self    Best call back number: [phone]    Patient is needing: PATIENT HAD LEFT FOOT SX WITH LINNEA 11/16/22 (NOT RIGHT) AND HAS NOT HURT RIGHT FOOT/ANKLE AND IS NOT SURE WHAT HAPPENED   SWELLING HAS GONE DOWN SOME BUT IS STILL VISIBLE.   PLEASE CALL PATIENT TO DISCUSS.   "

## (undated) DEVICE — FORCEPS BX L240CM JAW DIA2.4MM ORNG L CAP W/ NDL DISP RAD

## (undated) DEVICE — 4-PORT MANIFOLD: Brand: NEPTUNE 2

## (undated) DEVICE — GLV SURG BIOGEL LTX PF 6 1/2

## (undated) DEVICE — DRAPE,UTILITY,TAPE,15X26,STERILE: Brand: MEDLINE

## (undated) DEVICE — DRSNG WND GZ CURAD OIL EMULSION 3X8IN STRL PK/3

## (undated) DEVICE — DISPOSABLE TOURNIQUET CUFF SINGLE BLADDER, SINGLE PORT AND QUICK CONNECT CONNECTOR: Brand: COLOR CUFF

## (undated) DEVICE — TROCAR TIP NITINOL GUIDEWIRE 18": Brand: INVICTUS

## (undated) DEVICE — GOWN,NON-REINFORCED,SIRUS,SET IN SLV,XL: Brand: MEDLINE

## (undated) DEVICE — NDL HYPO PRECISIONGLIDE REG 25G 1 1/2

## (undated) DEVICE — SUT SILK 2/0 SH 75CM 30IN BLK C016D

## (undated) DEVICE — GLV SURG SENSICARE W/ALOE PF LF 7.5 STRL

## (undated) DEVICE — SUT MNCRYL 4/0 PS2 27IN UD MCP426H

## (undated) DEVICE — ENDO KIT: Brand: MEDLINE INDUSTRIES, INC.

## (undated) DEVICE — YANKAUER SUCTION INSTRUMENT WITHOUT CONTROL VENT, OPEN TIP, CLEAR: Brand: YANKAUER

## (undated) DEVICE — SUT SILK 4/0 SUTUPAK TIES 24IN SA73H

## (undated) DEVICE — GLV SURG BIOGEL LTX PF 7 1/2

## (undated) DEVICE — SUT SILK 2/0 SUTUPAK TIES 24IN SA75H

## (undated) DEVICE — ENDO KIT,LOURDES HOSPITAL: Brand: MEDLINE INDUSTRIES, INC.

## (undated) DEVICE — SPNG GZ WOVN 4X4IN 12PLY 10/BX STRL

## (undated) DEVICE — PACK,SET UP,NO DRAPES: Brand: MEDLINE

## (undated) DEVICE — SPK10277 JACKSON/PRO-AXIS KIT: Brand: SPK10277 JACKSON/PRO-AXIS KIT

## (undated) DEVICE — ELECTRD BLD EZ CLN STD 6.5IN

## (undated) DEVICE — FORCEPS BX L240CM DIA2.4MM L NDL RAD JAW 4 133340

## (undated) DEVICE — PK TURNOVER RM ADV

## (undated) DEVICE — 3M™ STERI-STRIP™ REINFORCED ADHESIVE SKIN CLOSURES, R1547, 1/2 IN X 4 IN (12 MM X 100 MM), 6 STRIPS/ENVELOPE: Brand: 3M™ STERI-STRIP™

## (undated) DEVICE — SUT SILK 3/0 SUTUPAK TIES 24IN SA74H

## (undated) DEVICE — ELECTRD BLD EZ CLN MOD XLNG 2.75IN

## (undated) DEVICE — DRSNG WND GZ CURAD OIL EMULSION 3X3IN STRL

## (undated) DEVICE — LT SOURCE LIGHTMAT BIF DISP

## (undated) DEVICE — ANTIBACTERIAL UNDYED BRAIDED (POLYGLACTIN 910), SYNTHETIC ABSORBABLE SUTURE: Brand: COATED VICRYL

## (undated) DEVICE — SYR LUERLOK 20CC BX/50

## (undated) DEVICE — KT DRP MINIVIEW STRL

## (undated) DEVICE — BNDG ELAS W/CLIP 6IN 10YD LF STRL

## (undated) DEVICE — PROB CADWELL 275MM

## (undated) DEVICE — CVR BRD ARM 13X30

## (undated) DEVICE — LP VESL MAXI 2.5X1MM RED 2PK

## (undated) DEVICE — CATH IV ANGIO FEP 12G 3IN LTBLU 10PK

## (undated) DEVICE — TBG PENCL TELESCP MEGADYNE SMOKE EVAC 10FT

## (undated) DEVICE — PK EXTRM 30

## (undated) DEVICE — TRAP FLD MINIVAC MEGADYNE 100ML

## (undated) DEVICE — CONN FLX BREATHE CIRCT

## (undated) DEVICE — BAPTIST TURNOVER KIT: Brand: MEDLINE INDUSTRIES, INC.

## (undated) DEVICE — GLV SURG GRN DERMASSURE LF PF 7.5

## (undated) DEVICE — NDL TP BVL JAMSHIDI ACC GUIDE ARCUS

## (undated) DEVICE — SINGLE PORT MANIFOLD: Brand: NEPTUNE 2

## (undated) DEVICE — SUT VIC 4/0 P3 18IN UD VCP494H

## (undated) DEVICE — INTENDED FOR TISSUE SEPARATION, AND OTHER PROCEDURES THAT REQUIRE A SHARP SURGICAL BLADE TO PUNCTURE OR CUT.: Brand: BARD-PARKER ® STAINLESS STEEL BLADES

## (undated) DEVICE — DRSNG SURESITE WNDW 4X4.5

## (undated) DEVICE — CVR UNIV C/ARM

## (undated) DEVICE — GLV SURG SENSICARE PI ORTHO PF SZ7 LF STRL

## (undated) DEVICE — SYS ACC IPAS3 EMG I/O PED BVL

## (undated) DEVICE — PRECISION THIN (9.0 X 0.38 X 31.0MM)

## (undated) DEVICE — DRP C/ARMOR

## (undated) DEVICE — SPNG DISSCT SECTO KTTNER PK/5

## (undated) DEVICE — SUT SILK 0 SUTUPAK TIES 24IN SA76G

## (undated) DEVICE — FORCEPS BX 240CM 2.4MM L NDL RAD JAW 4 M00513334

## (undated) DEVICE — GLV SURG DERMASSURE GRN LF PF 8.0

## (undated) DEVICE — GAUZE,SPONGE,4"X4",16PLY,XRAY,STRL,LF: Brand: MEDLINE

## (undated) DEVICE — 3.0MM PRECISION NEURO (MATCH HEAD)

## (undated) DEVICE — PREP SOL POVIDONE/IODINE BT 4OZ

## (undated) DEVICE — SPONGE,LAP,12"X12",XR,ST,5/PK,40PK/CS: Brand: MEDLINE

## (undated) DEVICE — DRSNG TELFA PAD NONADH STR 1S 3X8IN

## (undated) DEVICE — 4.0MM EGG BUR

## (undated) DEVICE — DRP SURG UTIL W/TPE 2/LAYR 15X26IN DISP

## (undated) DEVICE — COUNTERSINK, 3.5 HEADLESS: Brand: MONSTER® SCREW SYSTEM

## (undated) DEVICE — TOTAL TRAY, 16FR 10ML SIL FOLEY, URN: Brand: MEDLINE

## (undated) DEVICE — APPL CHLORAPREP HI/LITE 26ML ORNG

## (undated) DEVICE — SUT ETHLN 4/0 FS2 18IN 662H

## (undated) DEVICE — BANDAGE,GAUZE,BULKEE II,4.5"X4.1YD,STRL: Brand: MEDLINE

## (undated) DEVICE — PK SPINE LAT 30

## (undated) DEVICE — PATIENT RETURN ELECTRODE, SINGLE-USE, CONTACT QUALITY MONITORING, ADULT, WITH 9FT CORD, FOR PATIENTS WEIGING OVER 33LBS. (15KG): Brand: MEGADYNE

## (undated) DEVICE — GLV SURG SENSICARE PI ORTHO SZ7.5 LF STRL

## (undated) DEVICE — BNDG ELAS CO-FLEX SLF ADHR 4IN5YD LF STRL

## (undated) DEVICE — DRILL, 1.7 X 120MM, CANNULATED, AO: Brand: MONSTER SCREW SYSTEM

## (undated) DEVICE — SUT PDS 0 CTX 36IN VIO PDP370T

## (undated) DEVICE — CANNULA NSL AD L7FT DIV O2 CO2 W/ M LUERLOCK TRMPT CONN

## (undated) DEVICE — CLTH CLENS READYCLEANSE PERI CARE PK/5

## (undated) DEVICE — DRILL,  2.1 X 120MM, CANNULATED, AO: Brand: MONSTER SCREW SYSTEM

## (undated) DEVICE — PRECISION THIN (9.0 X 0.38 X 25.0MM)

## (undated) DEVICE — PK SPINE POST 30

## (undated) DEVICE — GAUZE,SPONGE,4"X4",16PLY,STRL,LF,10/TRAY: Brand: MEDLINE

## (undated) DEVICE — PRECISION THIN (5.5 X 0.38 X 18.0MM)

## (undated) DEVICE — UNDERCAST PADDING: Brand: DEROYAL

## (undated) DEVICE — COUNTERSINK, 3.0 HEADED: Brand: MONSTER® SCREW SYSTEM

## (undated) DEVICE — DRILL,  2.3 X 120MM, CANNULATED, AO: Brand: MONSTER SCREW SYSTEM

## (undated) DEVICE — BITE BLOCK ENDOSCP AD 60 FR W/ ADJ STRP PLAS GRN BLOX

## (undated) DEVICE — SUT PROLN 5/0 C1 DA 24IN 8725H

## (undated) DEVICE — SUT VIC 4/0 P3 18IN UD VCP494G

## (undated) DEVICE — PENCL ES MEGADINE EZ/CLEAN BUTN W/HOLSTR 10FT

## (undated) DEVICE — SUT VIC 5/0 P3 18IN UD VCP493G

## (undated) DEVICE — Device

## (undated) DEVICE — ARCUS STIMULATING TARGETING NEEDLE, BEVEL TIP: Brand: ARCUS

## (undated) DEVICE — TP SILK DURAPORE 3IN

## (undated) DEVICE — SCANLAN® SURG-I-PAW® INSTRUMENT COVERS - RED, 1/10" X 5"/ 3 MMX13 CM (2 - 5" PCS /PKG): Brand: SCANLAN® SURG-I-PAW® INSTRUMENT COVERS